# Patient Record
Sex: MALE | Race: WHITE | NOT HISPANIC OR LATINO
[De-identification: names, ages, dates, MRNs, and addresses within clinical notes are randomized per-mention and may not be internally consistent; named-entity substitution may affect disease eponyms.]

---

## 2017-04-20 ENCOUNTER — APPOINTMENT (OUTPATIENT)
Dept: CARDIOLOGY | Facility: CLINIC | Age: 82
End: 2017-04-20

## 2017-04-20 ENCOUNTER — NON-APPOINTMENT (OUTPATIENT)
Age: 82
End: 2017-04-20

## 2017-04-20 VITALS
HEART RATE: 90 BPM | WEIGHT: 215 LBS | HEIGHT: 69 IN | DIASTOLIC BLOOD PRESSURE: 78 MMHG | BODY MASS INDEX: 31.84 KG/M2 | SYSTOLIC BLOOD PRESSURE: 132 MMHG

## 2017-08-17 ENCOUNTER — APPOINTMENT (OUTPATIENT)
Dept: CARDIOLOGY | Facility: CLINIC | Age: 82
End: 2017-08-17

## 2017-09-07 ENCOUNTER — APPOINTMENT (OUTPATIENT)
Dept: CARDIOLOGY | Facility: CLINIC | Age: 82
End: 2017-09-07
Payer: MEDICARE

## 2017-09-07 ENCOUNTER — NON-APPOINTMENT (OUTPATIENT)
Age: 82
End: 2017-09-07

## 2017-09-07 VITALS
HEART RATE: 60 BPM | DIASTOLIC BLOOD PRESSURE: 84 MMHG | WEIGHT: 215 LBS | BODY MASS INDEX: 31.75 KG/M2 | OXYGEN SATURATION: 96 % | SYSTOLIC BLOOD PRESSURE: 160 MMHG

## 2017-09-07 VITALS — SYSTOLIC BLOOD PRESSURE: 124 MMHG | DIASTOLIC BLOOD PRESSURE: 84 MMHG

## 2017-09-07 PROCEDURE — 93000 ELECTROCARDIOGRAM COMPLETE: CPT

## 2017-09-07 PROCEDURE — 99214 OFFICE O/P EST MOD 30 MIN: CPT

## 2018-01-04 ENCOUNTER — APPOINTMENT (OUTPATIENT)
Dept: CARDIOLOGY | Facility: CLINIC | Age: 83
End: 2018-01-04

## 2018-01-05 ENCOUNTER — OTHER (OUTPATIENT)
Age: 83
End: 2018-01-05

## 2018-01-18 ENCOUNTER — APPOINTMENT (OUTPATIENT)
Dept: UROLOGY | Facility: CLINIC | Age: 83
End: 2018-01-18
Payer: MEDICARE

## 2018-01-18 VITALS
DIASTOLIC BLOOD PRESSURE: 80 MMHG | OXYGEN SATURATION: 97 % | TEMPERATURE: 97.7 F | SYSTOLIC BLOOD PRESSURE: 134 MMHG | HEART RATE: 62 BPM

## 2018-01-18 LAB
BILIRUB UR QL STRIP: NORMAL
CLARITY UR: CLEAR
COLLECTION METHOD: NORMAL
GLUCOSE UR-MCNC: NORMAL
HCG UR QL: 0.2 EU/DL
HGB UR QL STRIP.AUTO: NORMAL
KETONES UR-MCNC: NORMAL
LEUKOCYTE ESTERASE UR QL STRIP: NORMAL
NITRITE UR QL STRIP: NORMAL
PH UR STRIP: 6.5
PROT UR STRIP-MCNC: NORMAL
SP GR UR STRIP: 1.02

## 2018-01-18 PROCEDURE — 81003 URINALYSIS AUTO W/O SCOPE: CPT | Mod: QW

## 2018-01-18 PROCEDURE — 99214 OFFICE O/P EST MOD 30 MIN: CPT

## 2018-02-27 ENCOUNTER — NON-APPOINTMENT (OUTPATIENT)
Age: 83
End: 2018-02-27

## 2018-02-27 ENCOUNTER — APPOINTMENT (OUTPATIENT)
Dept: CARDIOLOGY | Facility: CLINIC | Age: 83
End: 2018-02-27
Payer: MEDICARE

## 2018-02-27 VITALS
SYSTOLIC BLOOD PRESSURE: 166 MMHG | BODY MASS INDEX: 32.14 KG/M2 | WEIGHT: 217 LBS | DIASTOLIC BLOOD PRESSURE: 87 MMHG | HEIGHT: 69 IN | TEMPERATURE: 79 F

## 2018-02-27 VITALS
DIASTOLIC BLOOD PRESSURE: 68 MMHG | WEIGHT: 217 LBS | BODY MASS INDEX: 32.14 KG/M2 | HEART RATE: 72 BPM | HEIGHT: 69 IN | SYSTOLIC BLOOD PRESSURE: 130 MMHG

## 2018-02-27 DIAGNOSIS — I49.9 CARDIAC ARRHYTHMIA, UNSPECIFIED: ICD-10-CM

## 2018-02-27 PROCEDURE — 93000 ELECTROCARDIOGRAM COMPLETE: CPT

## 2018-02-27 PROCEDURE — 99214 OFFICE O/P EST MOD 30 MIN: CPT

## 2018-03-06 ENCOUNTER — RX RENEWAL (OUTPATIENT)
Age: 83
End: 2018-03-06

## 2018-04-24 ENCOUNTER — EMERGENCY (EMERGENCY)
Facility: HOSPITAL | Age: 83
LOS: 1 days | Discharge: HOME | End: 2018-04-24
Attending: EMERGENCY MEDICINE | Admitting: EMERGENCY MEDICINE
Payer: MEDICARE

## 2018-04-24 VITALS
RESPIRATION RATE: 18 BRPM | TEMPERATURE: 98 F | DIASTOLIC BLOOD PRESSURE: 88 MMHG | HEART RATE: 76 BPM | OXYGEN SATURATION: 97 % | SYSTOLIC BLOOD PRESSURE: 154 MMHG

## 2018-04-24 PROCEDURE — 99284 EMERGENCY DEPT VISIT MOD MDM: CPT

## 2018-04-25 VITALS
TEMPERATURE: 98 F | SYSTOLIC BLOOD PRESSURE: 168 MMHG | DIASTOLIC BLOOD PRESSURE: 79 MMHG | RESPIRATION RATE: 20 BRPM | OXYGEN SATURATION: 94 % | HEART RATE: 64 BPM

## 2018-04-25 PROCEDURE — 99284 EMERGENCY DEPT VISIT MOD MDM: CPT

## 2018-04-25 PROCEDURE — 72170 X-RAY EXAM OF PELVIS: CPT | Mod: 26

## 2018-04-25 PROCEDURE — 73562 X-RAY EXAM OF KNEE 3: CPT | Mod: 26,50

## 2018-04-25 PROCEDURE — 71045 X-RAY EXAM CHEST 1 VIEW: CPT

## 2018-04-25 PROCEDURE — 72125 CT NECK SPINE W/O DYE: CPT | Mod: 26

## 2018-04-25 PROCEDURE — 70450 CT HEAD/BRAIN W/O DYE: CPT | Mod: 26

## 2018-04-25 PROCEDURE — 73562 X-RAY EXAM OF KNEE 3: CPT

## 2018-04-25 PROCEDURE — 70450 CT HEAD/BRAIN W/O DYE: CPT

## 2018-04-25 PROCEDURE — 72170 X-RAY EXAM OF PELVIS: CPT

## 2018-04-25 PROCEDURE — 72125 CT NECK SPINE W/O DYE: CPT

## 2018-04-25 PROCEDURE — 71045 X-RAY EXAM CHEST 1 VIEW: CPT | Mod: 26

## 2018-04-25 NOTE — ED PROVIDER NOTE - PROGRESS NOTE DETAILS
Milady GUZMAN: Patient reassessed and reports no new symptoms. No pain reported. CT results discussed with family. Patient ready for D/C Ambulette will be ordered.

## 2018-04-25 NOTE — ED PROVIDER NOTE - SHIFT CHANGE DETAILS
I have endorsed this patient to the incoming physician, including clinical history, physical exam, current results and assessment/plan. 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  Abrasions to left knee.  No visible head trauma.  Cardiac: s1s2  Lungs: CTABL  Abd: soft ntnd, no peripheral edema.  No midline spinal tenderenss, no hip deformities.  Plan: CT head/c-spine, xrays of chest, pelvis, and b/l knees.  If no acute findings/bleeding/fractures, will be stable for dc back to [facility] rehab.     Currently awaiting results of imaging.  Bev

## 2018-04-25 NOTE — ED PROVIDER NOTE - OBJECTIVE STATEMENT
89 YO male PMhx CVA 4/20/18 on eliquis, DM, HTN, Sleep apnea, GERD, presents to ED c/o fall at [facility] at 5:30 PM. Pt states he was discharged from [facility] at 4:30pm and arrived in [facility] facility for rehab. He notes he was in bed and stepping out of bed his legs felt weak, and fell forward  on his knees. Denies any precipitating event of CP, SOB, syncope before fall. Pt states he sustained no injuries, Denies CP, SOB, abdominal pain, knee pain. Denies head trauma, LOC, syncope.

## 2018-04-25 NOTE — ED PROVIDER NOTE - ATTENDING CONTRIBUTION TO CARE
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  Abrasions to left knee.  No visible head trauma.  Cardiac: s1s2  Lungs: CTABL  Abd: soft ntnd, no peripheral edema.  No midline spinal tenderenss, no hip deformities.  Plan: CT head/c-spine, xrays of chest, pelvis, and b/l knees.  If no acute findings/bleeding/fractures, will be stable for dc back to [facility] rehab.

## 2018-04-25 NOTE — ED ADULT NURSE REASSESSMENT NOTE - NS ED NURSE REASSESS COMMENT FT1
pt was VS and no IV access for pt to transfer to [facility]. Report was called to [place] Nurse supervisor.

## 2018-05-03 ENCOUNTER — OTHER (OUTPATIENT)
Age: 83
End: 2018-05-03

## 2018-06-19 ENCOUNTER — APPOINTMENT (OUTPATIENT)
Dept: CARDIOLOGY | Facility: CLINIC | Age: 83
End: 2018-06-19
Payer: MEDICARE

## 2018-06-19 ENCOUNTER — NON-APPOINTMENT (OUTPATIENT)
Age: 83
End: 2018-06-19

## 2018-06-19 VITALS — DIASTOLIC BLOOD PRESSURE: 74 MMHG | SYSTOLIC BLOOD PRESSURE: 124 MMHG

## 2018-06-19 VITALS
HEART RATE: 81 BPM | HEIGHT: 69 IN | OXYGEN SATURATION: 95 % | DIASTOLIC BLOOD PRESSURE: 82 MMHG | SYSTOLIC BLOOD PRESSURE: 153 MMHG

## 2018-06-19 PROCEDURE — 93000 ELECTROCARDIOGRAM COMPLETE: CPT

## 2018-06-19 PROCEDURE — 99215 OFFICE O/P EST HI 40 MIN: CPT

## 2018-07-26 ENCOUNTER — APPOINTMENT (OUTPATIENT)
Dept: UROLOGY | Facility: CLINIC | Age: 83
End: 2018-07-26
Payer: MEDICARE

## 2018-07-26 VITALS
BODY MASS INDEX: 32.14 KG/M2 | DIASTOLIC BLOOD PRESSURE: 74 MMHG | WEIGHT: 217 LBS | OXYGEN SATURATION: 99 % | HEART RATE: 66 BPM | SYSTOLIC BLOOD PRESSURE: 129 MMHG | HEIGHT: 69 IN

## 2018-07-26 PROCEDURE — 99213 OFFICE O/P EST LOW 20 MIN: CPT

## 2018-10-25 ENCOUNTER — APPOINTMENT (OUTPATIENT)
Dept: CARDIOLOGY | Facility: CLINIC | Age: 83
End: 2018-10-25
Payer: MEDICARE

## 2018-10-25 ENCOUNTER — NON-APPOINTMENT (OUTPATIENT)
Age: 83
End: 2018-10-25

## 2018-10-25 VITALS — OXYGEN SATURATION: 99 % | HEART RATE: 64 BPM | DIASTOLIC BLOOD PRESSURE: 79 MMHG | SYSTOLIC BLOOD PRESSURE: 149 MMHG

## 2018-10-25 PROCEDURE — 93000 ELECTROCARDIOGRAM COMPLETE: CPT

## 2018-10-25 PROCEDURE — 99214 OFFICE O/P EST MOD 30 MIN: CPT

## 2019-05-24 ENCOUNTER — NON-APPOINTMENT (OUTPATIENT)
Age: 84
End: 2019-05-24

## 2019-05-24 ENCOUNTER — APPOINTMENT (OUTPATIENT)
Dept: CARDIOLOGY | Facility: CLINIC | Age: 84
End: 2019-05-24
Payer: MEDICARE

## 2019-05-24 VITALS
WEIGHT: 196 LBS | DIASTOLIC BLOOD PRESSURE: 74 MMHG | BODY MASS INDEX: 29.03 KG/M2 | OXYGEN SATURATION: 99 % | SYSTOLIC BLOOD PRESSURE: 147 MMHG | HEART RATE: 65 BPM | HEIGHT: 69 IN

## 2019-05-24 DIAGNOSIS — G47.33 OBSTRUCTIVE SLEEP APNEA (ADULT) (PEDIATRIC): ICD-10-CM

## 2019-05-24 PROCEDURE — 93000 ELECTROCARDIOGRAM COMPLETE: CPT

## 2019-05-24 PROCEDURE — 99214 OFFICE O/P EST MOD 30 MIN: CPT

## 2019-05-24 RX ORDER — GLIPIZIDE 2.5 MG/1
2.5 TABLET, FILM COATED, EXTENDED RELEASE ORAL
Refills: 0 | Status: DISCONTINUED | COMMUNITY
Start: 2018-06-19 | End: 2019-05-24

## 2019-05-24 RX ORDER — LINAGLIPTIN 5 MG/1
5 TABLET, FILM COATED ORAL DAILY
Refills: 0 | Status: ACTIVE | COMMUNITY
Start: 2019-05-24

## 2019-05-24 NOTE — REASON FOR VISIT
[Ventricular Tachycardia] : ventricular tachycardia [Cardiomyopathy] : cardiomyopathy [FreeTextEntry1] : Stroke

## 2019-05-24 NOTE — HISTORY OF PRESENT ILLNESS
[FreeTextEntry1] : Mr. Baez presents in scheduled followup Accompanied by his aide and daughter.  He feels that he is progressing albeit slowly.  . Leg weakness limits him after approximately 500' w/nRW. Comfortable, walking indoors .. His principal/concern relates to his severe visual impairment.  \par \par Nocturia x4-5 and sleeps much of the day.\par   \par No c/o chest, throat,jaw, arm or upper back discomfort.  . No chest, throat, jaw, or arm discomfort. No dyspnea, orthopnea or PND.  No palpitations, dizziness or syncope.  No edema.\par \par A few minor falls at home. Fell approximately 10 days ago on a slippery floor in a restaurant landing on his back. No head trauma.\par \par 
DISPLAY PLAN FREE TEXT

## 2019-09-04 ENCOUNTER — APPOINTMENT (OUTPATIENT)
Dept: CARDIOLOGY | Facility: CLINIC | Age: 84
End: 2019-09-04
Payer: MEDICARE

## 2019-09-04 ENCOUNTER — NON-APPOINTMENT (OUTPATIENT)
Age: 84
End: 2019-09-04

## 2019-09-04 VITALS
SYSTOLIC BLOOD PRESSURE: 132 MMHG | HEIGHT: 69 IN | BODY MASS INDEX: 28.73 KG/M2 | WEIGHT: 194 LBS | OXYGEN SATURATION: 98 % | DIASTOLIC BLOOD PRESSURE: 76 MMHG | HEART RATE: 79 BPM

## 2019-09-04 PROCEDURE — 99214 OFFICE O/P EST MOD 30 MIN: CPT

## 2019-09-04 PROCEDURE — 93000 ELECTROCARDIOGRAM COMPLETE: CPT

## 2019-09-04 NOTE — HISTORY OF PRESENT ILLNESS
[FreeTextEntry1] : Mr. Baez presents in scheduled followup accompanied by his daughter.  He walks laps in his kitchen and is beginning to walk outdoors w/ his RW.  . Leg weakness limits him after approximately 500' w/nRW. Comfortable, walking indoors .. His principal/concern relates to his severe visual impairment.  \par \par No c/o chest, throat,jaw, arm or upper back discomfort.  . No chest, throat, jaw, or arm discomfort. No dyspnea, orthopnea or PND.  No palpitations, dizziness or syncope.  No edema.\par \par Very concerned about his wife's disability and depression.\par \par

## 2019-09-04 NOTE — REASON FOR VISIT
[Cardiomyopathy] : cardiomyopathy [Ventricular Tachycardia] : ventricular tachycardia [FreeTextEntry1] : Stroke

## 2019-09-08 ENCOUNTER — TRANSCRIPTION ENCOUNTER (OUTPATIENT)
Age: 84
End: 2019-09-08

## 2019-11-25 ENCOUNTER — MEDICATION RENEWAL (OUTPATIENT)
Age: 84
End: 2019-11-25

## 2019-11-26 ENCOUNTER — MEDICATION RENEWAL (OUTPATIENT)
Age: 84
End: 2019-11-26

## 2019-11-27 ENCOUNTER — APPOINTMENT (OUTPATIENT)
Dept: UROLOGY | Facility: CLINIC | Age: 84
End: 2019-11-27
Payer: MEDICARE

## 2019-11-27 VITALS
HEART RATE: 60 BPM | RESPIRATION RATE: 14 BRPM | DIASTOLIC BLOOD PRESSURE: 72 MMHG | OXYGEN SATURATION: 94 % | SYSTOLIC BLOOD PRESSURE: 104 MMHG

## 2019-11-27 DIAGNOSIS — N32.81 OVERACTIVE BLADDER: ICD-10-CM

## 2019-11-27 PROCEDURE — 99214 OFFICE O/P EST MOD 30 MIN: CPT

## 2019-11-27 NOTE — ASSESSMENT
[FreeTextEntry1] : #1) Symptomatic obstructive BPH:Restart tamsulosin and finasteride \par \par #2) overactive bladder: Inactive at this time.\par \par RTO 3 months for reevaluation.\par

## 2019-11-27 NOTE — PHYSICAL EXAM
[General Appearance - Well Developed] : well developed [General Appearance - Well Nourished] : well nourished [Normal Appearance] : normal appearance [Well Groomed] : well groomed [General Appearance - In No Acute Distress] : no acute distress [Bowel Sounds] : normal bowel sounds [Abdomen Soft] : soft [Abdomen Tenderness] : non-tender [Abdomen Mass (___ Cm)] : no abdominal mass palpated [Costovertebral Angle Tenderness] : no ~M costovertebral angle tenderness [] : no rash [Edema] : no peripheral edema [Oriented To Time, Place, And Person] : oriented to person, place, and time [Affect] : the affect was normal [Mood] : the mood was normal [Not Anxious] : not anxious [No Focal Deficits] : no focal deficits [FreeTextEntry1] : The patient is now in a wheelchair.

## 2019-11-27 NOTE — HISTORY OF PRESENT ILLNESS
[FreeTextEntry1] : The patient is a 89-year-old gentleman who was seen in the office today with his daughter, Odette, for the above. He had been on tamsulosin and finasteride but at the last visit on 7/26/18, he stated that he forgot to take his medication for a time and is urinary symptoms did not get worse. He decided to stay off these medications. His daughter stated that over the last couple of my him and his symptoms have become much worse and brought him in today to restart medical therapy. He denies other urological and constitutional symptomatology.\par \par His past medical history, is updated for a right CVA with left weakness in 4/18.\par His surgical history, medication history, and allergy history are unchanged.

## 2019-12-02 LAB
ALBUMIN SERPL ELPH-MCNC: 4.2 G/DL
ALP BLD-CCNC: 90 U/L
ALT SERPL-CCNC: 13 U/L
ANION GAP SERPL CALC-SCNC: 14 MMOL/L
AST SERPL-CCNC: 13 U/L
BASOPHILS # BLD AUTO: 0.05 K/UL
BASOPHILS NFR BLD AUTO: 0.7 %
BILIRUB SERPL-MCNC: 0.4 MG/DL
BUN SERPL-MCNC: 32 MG/DL
CALCIUM SERPL-MCNC: 9.6 MG/DL
CHLORIDE SERPL-SCNC: 99 MMOL/L
CO2 SERPL-SCNC: 23 MMOL/L
CREAT SERPL-MCNC: 1.94 MG/DL
EOSINOPHIL # BLD AUTO: 0.43 K/UL
EOSINOPHIL NFR BLD AUTO: 5.7 %
GLUCOSE SERPL-MCNC: 161 MG/DL
HCT VFR BLD CALC: 40.7 %
HGB BLD-MCNC: 13.6 G/DL
IMM GRANULOCYTES NFR BLD AUTO: 0.5 %
LYMPHOCYTES # BLD AUTO: 1.6 K/UL
LYMPHOCYTES NFR BLD AUTO: 21.1 %
MAN DIFF?: NORMAL
MCHC RBC-ENTMCNC: 31.4 PG
MCHC RBC-ENTMCNC: 33.4 GM/DL
MCV RBC AUTO: 94 FL
MONOCYTES # BLD AUTO: 0.66 K/UL
MONOCYTES NFR BLD AUTO: 8.7 %
NEUTROPHILS # BLD AUTO: 4.81 K/UL
NEUTROPHILS NFR BLD AUTO: 63.3 %
PLATELET # BLD AUTO: 248 K/UL
POTASSIUM SERPL-SCNC: 4.9 MMOL/L
PROT SERPL-MCNC: 7 G/DL
RBC # BLD: 4.33 M/UL
RBC # FLD: 12.4 %
SODIUM SERPL-SCNC: 136 MMOL/L
WBC # FLD AUTO: 7.59 K/UL

## 2019-12-26 ENCOUNTER — LABORATORY RESULT (OUTPATIENT)
Age: 84
End: 2019-12-26

## 2019-12-26 ENCOUNTER — OTHER (OUTPATIENT)
Age: 84
End: 2019-12-26

## 2020-01-27 ENCOUNTER — APPOINTMENT (OUTPATIENT)
Dept: CARDIOLOGY | Facility: CLINIC | Age: 85
End: 2020-01-27
Payer: MEDICARE

## 2020-01-27 ENCOUNTER — NON-APPOINTMENT (OUTPATIENT)
Age: 85
End: 2020-01-27

## 2020-01-27 VITALS
HEART RATE: 51 BPM | DIASTOLIC BLOOD PRESSURE: 69 MMHG | WEIGHT: 200 LBS | SYSTOLIC BLOOD PRESSURE: 171 MMHG | TEMPERATURE: 98.1 F | OXYGEN SATURATION: 100 % | BODY MASS INDEX: 29.62 KG/M2 | RESPIRATION RATE: 16 BRPM | HEIGHT: 69 IN

## 2020-01-27 VITALS — SYSTOLIC BLOOD PRESSURE: 142 MMHG | DIASTOLIC BLOOD PRESSURE: 78 MMHG

## 2020-01-27 PROCEDURE — 93000 ELECTROCARDIOGRAM COMPLETE: CPT

## 2020-01-27 PROCEDURE — 99214 OFFICE O/P EST MOD 30 MIN: CPT

## 2020-01-27 NOTE — HISTORY OF PRESENT ILLNESS
[FreeTextEntry1] : Mr. Baez presents in scheduled followup accompanied by his son-in-law.he offers no specific complaints.  Now has full-time aide.  Nocturia improved somewhat after the reinstitution of x2 low sent and finasteride.  Activities are limited but no specific effort provoked symptoms.   \par \par No c/o chest, throat,jaw, arm or upper back discomfort.  . No chest, throat, jaw, or arm discomfort. No dyspnea, orthopnea or PND.  No palpitations, dizziness or syncope.  No edema.\par \par Not using CPAP.

## 2020-04-01 ENCOUNTER — APPOINTMENT (OUTPATIENT)
Dept: UROLOGY | Facility: CLINIC | Age: 85
End: 2020-04-01

## 2020-05-21 ENCOUNTER — APPOINTMENT (OUTPATIENT)
Dept: CARDIOLOGY | Facility: CLINIC | Age: 85
End: 2020-05-21

## 2020-06-15 ENCOUNTER — RX RENEWAL (OUTPATIENT)
Age: 85
End: 2020-06-15

## 2020-07-01 ENCOUNTER — RX RENEWAL (OUTPATIENT)
Age: 85
End: 2020-07-01

## 2020-07-14 ENCOUNTER — APPOINTMENT (OUTPATIENT)
Dept: CARDIOLOGY | Facility: CLINIC | Age: 85
End: 2020-07-14
Payer: MEDICARE

## 2020-07-14 ENCOUNTER — NON-APPOINTMENT (OUTPATIENT)
Age: 85
End: 2020-07-14

## 2020-07-14 VITALS
DIASTOLIC BLOOD PRESSURE: 78 MMHG | OXYGEN SATURATION: 97 % | SYSTOLIC BLOOD PRESSURE: 116 MMHG | TEMPERATURE: 98.2 F | HEART RATE: 66 BPM

## 2020-07-14 PROCEDURE — 99214 OFFICE O/P EST MOD 30 MIN: CPT

## 2020-07-14 PROCEDURE — 93000 ELECTROCARDIOGRAM COMPLETE: CPT

## 2020-07-14 PROCEDURE — 36415 COLL VENOUS BLD VENIPUNCTURE: CPT

## 2020-07-14 NOTE — HISTORY OF PRESENT ILLNESS
[FreeTextEntry1] : Mr. Baez presents in scheduled followup accompanied by his wife and daughter.  He offers no specific complaints.  Reports that he was hospitalized in March with a UTI associated with fusion.  Responded to Cipro.  Treated for recurrent UTI once thereafter and again responded to Cipro.  Discharge amiodarone was increased to 200 mg once daily.  Daughter does not know what drove this change\par \par Since that time he has been inactive.  Refuses the goading of his aids to walk and has developed bedsores.  He is admittedly depressed but feels that he is improved somewhat late.  Still has periods of confusion and occasional periods of agitation.\par \par No c/o chest, throat,jaw, arm or upper back discomfort.  . No chest, throat, jaw, or arm discomfort. No dyspnea, orthopnea or PND.  No palpitations, dizziness or syncope.  No edema.\par

## 2020-07-15 LAB
ALBUMIN SERPL ELPH-MCNC: 4.3 G/DL
ALP BLD-CCNC: 75 U/L
ALT SERPL-CCNC: 58 U/L
ANION GAP SERPL CALC-SCNC: 16 MMOL/L
AST SERPL-CCNC: 39 U/L
BASOPHILS # BLD AUTO: 0.05 K/UL
BASOPHILS NFR BLD AUTO: 0.5 %
BILIRUB SERPL-MCNC: 0.4 MG/DL
BUN SERPL-MCNC: 28 MG/DL
CALCIUM SERPL-MCNC: 9.3 MG/DL
CHLORIDE SERPL-SCNC: 99 MMOL/L
CHOLEST SERPL-MCNC: 120 MG/DL
CHOLEST/HDLC SERPL: 3 RATIO
CO2 SERPL-SCNC: 21 MMOL/L
CREAT SERPL-MCNC: 1.89 MG/DL
EOSINOPHIL # BLD AUTO: 0.3 K/UL
EOSINOPHIL NFR BLD AUTO: 3.3 %
ESTIMATED AVERAGE GLUCOSE: 140 MG/DL
GLUCOSE SERPL-MCNC: 110 MG/DL
HBA1C MFR BLD HPLC: 6.5 %
HCT VFR BLD CALC: 42.3 %
HDLC SERPL-MCNC: 40 MG/DL
HGB BLD-MCNC: 13.2 G/DL
IMM GRANULOCYTES NFR BLD AUTO: 0.3 %
LDLC SERPL CALC-MCNC: 48 MG/DL
LDLC SERPL DIRECT ASSAY-MCNC: 57 MG/DL
LYMPHOCYTES # BLD AUTO: 2.01 K/UL
LYMPHOCYTES NFR BLD AUTO: 22.1 %
MAN DIFF?: NORMAL
MCHC RBC-ENTMCNC: 31.1 PG
MCHC RBC-ENTMCNC: 31.2 GM/DL
MCV RBC AUTO: 99.5 FL
MONOCYTES # BLD AUTO: 0.92 K/UL
MONOCYTES NFR BLD AUTO: 10.1 %
NEUTROPHILS # BLD AUTO: 5.79 K/UL
NEUTROPHILS NFR BLD AUTO: 63.7 %
PLATELET # BLD AUTO: 244 K/UL
POTASSIUM SERPL-SCNC: 4.7 MMOL/L
PROT SERPL-MCNC: 7.3 G/DL
RBC # BLD: 4.25 M/UL
RBC # FLD: 12.9 %
SODIUM SERPL-SCNC: 136 MMOL/L
T4 FREE SERPL-MCNC: 1.5 NG/DL
TRIGL SERPL-MCNC: 161 MG/DL
TSH SERPL-ACNC: 2.66 UIU/ML
WBC # FLD AUTO: 9.1 K/UL

## 2020-07-16 LAB
SARS-COV-2 IGG SERPL IA-ACNC: <0.1 INDEX
SARS-COV-2 IGG SERPL QL IA: NEGATIVE

## 2020-09-04 ENCOUNTER — RX RENEWAL (OUTPATIENT)
Age: 85
End: 2020-09-04

## 2020-11-02 ENCOUNTER — APPOINTMENT (OUTPATIENT)
Dept: CARDIOLOGY | Facility: CLINIC | Age: 85
End: 2020-11-02
Payer: MEDICARE

## 2020-11-02 ENCOUNTER — NON-APPOINTMENT (OUTPATIENT)
Age: 85
End: 2020-11-02

## 2020-11-02 VITALS
HEIGHT: 69 IN | SYSTOLIC BLOOD PRESSURE: 128 MMHG | HEART RATE: 67 BPM | DIASTOLIC BLOOD PRESSURE: 70 MMHG | TEMPERATURE: 98.2 F

## 2020-11-02 DIAGNOSIS — I49.3 VENTRICULAR PREMATURE DEPOLARIZATION: ICD-10-CM

## 2020-11-02 DIAGNOSIS — Z79.899 OTHER LONG TERM (CURRENT) DRUG THERAPY: ICD-10-CM

## 2020-11-02 DIAGNOSIS — R94.31 ABNORMAL ELECTROCARDIOGRAM [ECG] [EKG]: ICD-10-CM

## 2020-11-02 DIAGNOSIS — Z23 ENCOUNTER FOR IMMUNIZATION: ICD-10-CM

## 2020-11-02 PROCEDURE — 99214 OFFICE O/P EST MOD 30 MIN: CPT

## 2020-11-02 PROCEDURE — 90732 PPSV23 VACC 2 YRS+ SUBQ/IM: CPT

## 2020-11-02 PROCEDURE — 36415 COLL VENOUS BLD VENIPUNCTURE: CPT

## 2020-11-02 PROCEDURE — 90662 IIV NO PRSV INCREASED AG IM: CPT

## 2020-11-02 PROCEDURE — G0008: CPT

## 2020-11-02 PROCEDURE — 93000 ELECTROCARDIOGRAM COMPLETE: CPT

## 2020-11-02 PROCEDURE — G0009: CPT

## 2020-11-02 RX ORDER — CIPROFLOXACIN HYDROCHLORIDE 500 MG/1
500 TABLET, FILM COATED ORAL TWICE DAILY
Qty: 14 | Refills: 0 | Status: DISCONTINUED | COMMUNITY
Start: 2020-05-23 | End: 2020-11-02

## 2020-11-02 RX ORDER — ESCITALOPRAM OXALATE 10 MG/1
10 TABLET ORAL
Qty: 90 | Refills: 0 | Status: ACTIVE | COMMUNITY
Start: 2020-07-23

## 2020-11-02 NOTE — HISTORY OF PRESENT ILLNESS
[FreeTextEntry1] : Mr. Baez presents in scheduled followup accompanied by his daughter.  He offers no specific complaints.  His daughter reports that he has improved with excellent appetite and some subtle increase in activity although he still enjoys spending much of his time in bed and has to be coaxed into a chair.  His only significant interval problem was 2 additional URIs.  Although the daughter does not recall any fever during these episodes, the family finds that his stroke symptoms are more pronounced at these times with confusion and left-sided weakness.  Returns to baseline after resolution of the acute infection.  Decubiti have healed.\par \par No c/o chest, throat,jaw, arm or upper back discomfort.  . No chest, throat, jaw, or arm discomfort. No dyspnea, orthopnea or PND.  No palpitations, dizziness or syncope.  No edema.\par

## 2020-11-03 LAB
25(OH)D3 SERPL-MCNC: 50.1 NG/ML
ALBUMIN SERPL ELPH-MCNC: 4 G/DL
ALP BLD-CCNC: 83 U/L
ALT SERPL-CCNC: 79 U/L
ANION GAP SERPL CALC-SCNC: 12 MMOL/L
APPEARANCE: ABNORMAL
AST SERPL-CCNC: 50 U/L
BACTERIA: ABNORMAL
BASOPHILS # BLD AUTO: 0.06 K/UL
BASOPHILS NFR BLD AUTO: 0.6 %
BILIRUB SERPL-MCNC: 0.5 MG/DL
BILIRUBIN URINE: NEGATIVE
BLOOD URINE: NEGATIVE
BUN SERPL-MCNC: 28 MG/DL
CALCIUM SERPL-MCNC: 8.8 MG/DL
CHLORIDE SERPL-SCNC: 99 MMOL/L
CHOLEST SERPL-MCNC: 110 MG/DL
CO2 SERPL-SCNC: 24 MMOL/L
COLOR: YELLOW
CREAT SERPL-MCNC: 1.73 MG/DL
EOSINOPHIL # BLD AUTO: 0.24 K/UL
EOSINOPHIL NFR BLD AUTO: 2.4 %
ESTIMATED AVERAGE GLUCOSE: 146 MG/DL
GLUCOSE QUALITATIVE U: NEGATIVE
GLUCOSE SERPL-MCNC: 111 MG/DL
HBA1C MFR BLD HPLC: 6.7 %
HCT VFR BLD CALC: 40.8 %
HDLC SERPL-MCNC: 40 MG/DL
HGB BLD-MCNC: 13.4 G/DL
HYALINE CASTS: 0 /LPF
IMM GRANULOCYTES NFR BLD AUTO: 0.5 %
KETONES URINE: NEGATIVE
LDLC SERPL CALC-MCNC: 37 MG/DL
LDLC SERPL DIRECT ASSAY-MCNC: 49 MG/DL
LEUKOCYTE ESTERASE URINE: ABNORMAL
LYMPHOCYTES # BLD AUTO: 2.19 K/UL
LYMPHOCYTES NFR BLD AUTO: 22.3 %
MAN DIFF?: NORMAL
MCHC RBC-ENTMCNC: 32.5 PG
MCHC RBC-ENTMCNC: 32.8 GM/DL
MCV RBC AUTO: 99 FL
MICROSCOPIC-UA: NORMAL
MONOCYTES # BLD AUTO: 0.95 K/UL
MONOCYTES NFR BLD AUTO: 9.7 %
NEUTROPHILS # BLD AUTO: 6.35 K/UL
NEUTROPHILS NFR BLD AUTO: 64.5 %
NITRITE URINE: POSITIVE
NONHDLC SERPL-MCNC: 70 MG/DL
PH URINE: 8.5
PLATELET # BLD AUTO: 231 K/UL
POTASSIUM SERPL-SCNC: 4.6 MMOL/L
PROT SERPL-MCNC: 6.5 G/DL
PROTEIN URINE: ABNORMAL
RBC # BLD: 4.12 M/UL
RBC # FLD: 13.2 %
RED BLOOD CELLS URINE: 4 /HPF
SODIUM SERPL-SCNC: 135 MMOL/L
SPECIFIC GRAVITY URINE: 1.02
SQUAMOUS EPITHELIAL CELLS: 1 /HPF
T4 FREE SERPL-MCNC: 1.5 NG/DL
TRIGL SERPL-MCNC: 167 MG/DL
TSH SERPL-ACNC: 2.95 UIU/ML
UROBILINOGEN URINE: NORMAL
WBC # FLD AUTO: 9.84 K/UL
WHITE BLOOD CELLS URINE: 38 /HPF

## 2020-11-05 LAB — BACTERIA UR CULT: ABNORMAL

## 2020-11-06 DIAGNOSIS — Z87.440 PERSONAL HISTORY OF URINARY (TRACT) INFECTIONS: ICD-10-CM

## 2020-12-15 ENCOUNTER — RX RENEWAL (OUTPATIENT)
Age: 85
End: 2020-12-15

## 2020-12-17 RX ORDER — POTASSIUM CHLORIDE 750 MG/1
10 TABLET, FILM COATED, EXTENDED RELEASE ORAL DAILY
Qty: 90 | Refills: 3 | Status: ACTIVE | COMMUNITY
Start: 2018-06-19 | End: 1900-01-01

## 2020-12-23 ENCOUNTER — APPOINTMENT (OUTPATIENT)
Dept: UROLOGY | Facility: CLINIC | Age: 85
End: 2020-12-23
Payer: MEDICARE

## 2020-12-23 DIAGNOSIS — R39.9 UNSPECIFIED SYMPTOMS AND SIGNS INVOLVING THE GENITOURINARY SYSTEM: ICD-10-CM

## 2020-12-23 PROBLEM — Z87.440 HISTORY OF URINARY TRACT INFECTION: Status: RESOLVED | Noted: 2020-11-06 | Resolved: 2020-12-23

## 2020-12-23 PROCEDURE — 51702 INSERT TEMP BLADDER CATH: CPT

## 2020-12-23 PROCEDURE — 99213 OFFICE O/P EST LOW 20 MIN: CPT | Mod: 25

## 2020-12-23 NOTE — PHYSICAL EXAM
[General Appearance - Well Developed] : well developed [General Appearance - Well Nourished] : well nourished [Normal Appearance] : normal appearance [Well Groomed] : well groomed [General Appearance - In No Acute Distress] : no acute distress [Bowel Sounds] : normal bowel sounds [Abdomen Soft] : soft [Abdomen Tenderness] : non-tender [Abdomen Mass (___ Cm)] : no abdominal mass palpated [Costovertebral Angle Tenderness] : no ~M costovertebral angle tenderness [] : no rash [Affect] : the affect was normal [Mood] : the mood was normal [Not Anxious] : not anxious [No Focal Deficits] : no focal deficits [FreeTextEntry1] : The patient is in a wheelchair.

## 2020-12-23 NOTE — HISTORY OF PRESENT ILLNESS
[FreeTextEntry1] : The patient is a 89-year-old gentleman who has been on tamsulosin and finasteride for BPH.  His daughter stated on the phone urine was dark and foul smelling and that he developed an imbalance problem which she gets with a UTI.  He has had no flank or abdominal pain and no fever or chills.  She also reported that he has had 5 UTIs over the last 5 months.  He had to be hospitalized at [facility] in August for 1 of these.  His last UTI was on 11/2/2020 and the urine culture grew greater than 100,000 colonies of Proteus mirabilis sensitive to cephalosporins.  Dr. Pepe treated him with cefuroxime.\par When his daughter called recently stating that his symptoms have recurred she was requested to bring him to the office for straight cath for a sterile urine specimen.\par He has no new urological or constitutional symptomatology.\par \par His past medical history, surgical history, medication history and allergy history are unchanged.\par \par \par

## 2020-12-23 NOTE — ASSESSMENT
[FreeTextEntry1] : 1.)  Recurrent UTIs\par 5 UTIs over the last 5 months according to the daughter.\par Suspect incomplete bladder emptying due to BPH and detrusor hypofunction.\par \par Patient voided just before he came to the office.\par A PVR bladder scan was done and 140 mL were present.\par He was incontinent before the 16 Indonesian Cortes could be inserted so only 20 cc were obtained after the catheter had been placed.  This will be left indwelling and the reason was explained to the patient and his daughter.\par Keflex 250 mg p.o. 4 times daily x1 week will be started empirically until the urine culture is available.\par \par Inserted 16Fr Coude catheter without difficulty as per Dr. Yo.\par Urine dark, danielle.\par Specimen obtained for Culture and Sensitivity.\par Pt.'s daughter instructed on emptying drainage bag, changing bag.\par She verbalizes understanding.\par Has Home Health Aides.\par To arrange Home Care for routine catheter changes monthly.\par Supplies for first catheter change given to daughter.

## 2020-12-27 LAB — BACTERIA UR CULT: ABNORMAL

## 2021-01-01 ENCOUNTER — APPOINTMENT (OUTPATIENT)
Dept: CARDIOLOGY | Facility: CLINIC | Age: 86
End: 2021-01-01
Payer: MEDICARE

## 2021-01-01 ENCOUNTER — LABORATORY RESULT (OUTPATIENT)
Age: 86
End: 2021-01-01

## 2021-01-01 ENCOUNTER — NON-APPOINTMENT (OUTPATIENT)
Age: 86
End: 2021-01-01

## 2021-01-01 ENCOUNTER — TRANSCRIPTION ENCOUNTER (OUTPATIENT)
Age: 86
End: 2021-01-01

## 2021-01-01 ENCOUNTER — RX RENEWAL (OUTPATIENT)
Age: 86
End: 2021-01-01

## 2021-01-01 ENCOUNTER — INPATIENT (INPATIENT)
Facility: HOSPITAL | Age: 86
LOS: 8 days | DRG: 177 | End: 2022-01-01
Attending: STUDENT IN AN ORGANIZED HEALTH CARE EDUCATION/TRAINING PROGRAM | Admitting: STUDENT IN AN ORGANIZED HEALTH CARE EDUCATION/TRAINING PROGRAM
Payer: MEDICARE

## 2021-01-01 ENCOUNTER — INPATIENT (INPATIENT)
Facility: HOSPITAL | Age: 86
LOS: 5 days | Discharge: HOME | DRG: 698 | End: 2021-01-26
Attending: STUDENT IN AN ORGANIZED HEALTH CARE EDUCATION/TRAINING PROGRAM | Admitting: STUDENT IN AN ORGANIZED HEALTH CARE EDUCATION/TRAINING PROGRAM
Payer: MEDICARE

## 2021-01-01 ENCOUNTER — APPOINTMENT (OUTPATIENT)
Dept: CARDIOLOGY | Facility: CLINIC | Age: 86
End: 2021-01-01

## 2021-01-01 ENCOUNTER — APPOINTMENT (OUTPATIENT)
Dept: UROLOGY | Facility: CLINIC | Age: 86
End: 2021-01-01
Payer: MEDICARE

## 2021-01-01 ENCOUNTER — APPOINTMENT (OUTPATIENT)
Dept: UROLOGY | Facility: CLINIC | Age: 86
End: 2021-01-01

## 2021-01-01 ENCOUNTER — EMERGENCY (EMERGENCY)
Facility: HOSPITAL | Age: 86
LOS: 1 days | Discharge: HOME | End: 2021-01-01
Attending: EMERGENCY MEDICINE
Payer: MEDICARE

## 2021-01-01 VITALS
SYSTOLIC BLOOD PRESSURE: 113 MMHG | BODY MASS INDEX: 29.62 KG/M2 | DIASTOLIC BLOOD PRESSURE: 71 MMHG | TEMPERATURE: 98 F | HEIGHT: 69 IN | RESPIRATION RATE: 17 BRPM | WEIGHT: 200 LBS | HEART RATE: 75 BPM

## 2021-01-01 VITALS
HEART RATE: 66 BPM | SYSTOLIC BLOOD PRESSURE: 138 MMHG | WEIGHT: 179.9 LBS | TEMPERATURE: 98 F | DIASTOLIC BLOOD PRESSURE: 80 MMHG | RESPIRATION RATE: 16 BRPM

## 2021-01-01 VITALS
DIASTOLIC BLOOD PRESSURE: 80 MMHG | RESPIRATION RATE: 20 BRPM | SYSTOLIC BLOOD PRESSURE: 175 MMHG | TEMPERATURE: 97 F | HEART RATE: 55 BPM | OXYGEN SATURATION: 98 %

## 2021-01-01 VITALS
WEIGHT: 160.06 LBS | RESPIRATION RATE: 18 BRPM | DIASTOLIC BLOOD PRESSURE: 68 MMHG | TEMPERATURE: 98 F | HEART RATE: 64 BPM | OXYGEN SATURATION: 96 % | SYSTOLIC BLOOD PRESSURE: 122 MMHG

## 2021-01-01 VITALS
HEART RATE: 72 BPM | SYSTOLIC BLOOD PRESSURE: 125 MMHG | RESPIRATION RATE: 22 BRPM | WEIGHT: 220.02 LBS | OXYGEN SATURATION: 94 % | DIASTOLIC BLOOD PRESSURE: 69 MMHG | TEMPERATURE: 98 F

## 2021-01-01 VITALS
OXYGEN SATURATION: 95 % | TEMPERATURE: 98 F | DIASTOLIC BLOOD PRESSURE: 79 MMHG | HEART RATE: 56 BPM | SYSTOLIC BLOOD PRESSURE: 157 MMHG | RESPIRATION RATE: 18 BRPM

## 2021-01-01 VITALS — TEMPERATURE: 94.2 F

## 2021-01-01 DIAGNOSIS — U07.1 COVID-19: ICD-10-CM

## 2021-01-01 DIAGNOSIS — E11.9 TYPE 2 DIABETES MELLITUS WITHOUT COMPLICATIONS: ICD-10-CM

## 2021-01-01 DIAGNOSIS — Z86.73 PERSONAL HISTORY OF TRANSIENT ISCHEMIC ATTACK (TIA), AND CEREBRAL INFARCTION WITHOUT RESIDUAL DEFICITS: ICD-10-CM

## 2021-01-01 DIAGNOSIS — Z71.89 OTHER SPECIFIED COUNSELING: ICD-10-CM

## 2021-01-01 DIAGNOSIS — N39.0 URINARY TRACT INFECTION, SITE NOT SPECIFIED: ICD-10-CM

## 2021-01-01 DIAGNOSIS — J96.01 ACUTE RESPIRATORY FAILURE WITH HYPOXIA: ICD-10-CM

## 2021-01-01 DIAGNOSIS — K63.1 PERFORATION OF INTESTINE (NONTRAUMATIC): Chronic | ICD-10-CM

## 2021-01-01 DIAGNOSIS — M48.00 SPINAL STENOSIS, SITE UNSPECIFIED: ICD-10-CM

## 2021-01-01 DIAGNOSIS — R05.9 COUGH, UNSPECIFIED: ICD-10-CM

## 2021-01-01 DIAGNOSIS — M67.432 GANGLION, LEFT WRIST: Chronic | ICD-10-CM

## 2021-01-01 DIAGNOSIS — Z86.79 PERSONAL HISTORY OF OTHER DISEASES OF THE CIRCULATORY SYSTEM: ICD-10-CM

## 2021-01-01 DIAGNOSIS — R77.8 OTHER SPECIFIED ABNORMALITIES OF PLASMA PROTEINS: ICD-10-CM

## 2021-01-01 DIAGNOSIS — N40.1 BENIGN PROSTATIC HYPERPLASIA WITH LOWER URINARY TRACT SYMPMS: ICD-10-CM

## 2021-01-01 DIAGNOSIS — N39.41 URGE INCONTINENCE: ICD-10-CM

## 2021-01-01 DIAGNOSIS — Z98.49 CATARACT EXTRACTION STATUS, UNSPECIFIED EYE: Chronic | ICD-10-CM

## 2021-01-01 DIAGNOSIS — N13.8 BENIGN PROSTATIC HYPERPLASIA WITH LOWER URINARY TRACT SYMPMS: ICD-10-CM

## 2021-01-01 DIAGNOSIS — R53.81 OTHER MALAISE: ICD-10-CM

## 2021-01-01 DIAGNOSIS — R74.8 ABNORMAL LEVELS OF OTHER SERUM ENZYMES: ICD-10-CM

## 2021-01-01 DIAGNOSIS — R33.9 RETENTION OF URINE, UNSPECIFIED: ICD-10-CM

## 2021-01-01 DIAGNOSIS — I25.10 ATHEROSCLEROTIC HEART DISEASE OF NATIVE CORONARY ARTERY W/OUT ANGINA PECTORIS: ICD-10-CM

## 2021-01-01 DIAGNOSIS — Z90.49 ACQUIRED ABSENCE OF OTHER SPECIFIED PARTS OF DIGESTIVE TRACT: Chronic | ICD-10-CM

## 2021-01-01 DIAGNOSIS — I49.9 CARDIAC ARRHYTHMIA, UNSPECIFIED: ICD-10-CM

## 2021-01-01 DIAGNOSIS — Z29.9 ENCOUNTER FOR PROPHYLACTIC MEASURES, UNSPECIFIED: ICD-10-CM

## 2021-01-01 DIAGNOSIS — N17.9 ACUTE KIDNEY FAILURE, UNSPECIFIED: ICD-10-CM

## 2021-01-01 DIAGNOSIS — Z91.89 OTHER SPECIFIED PERSONAL RISK FACTORS, NOT ELSEWHERE CLASSIFIED: ICD-10-CM

## 2021-01-01 DIAGNOSIS — E11.9 TYPE 2 DIABETES MELLITUS W/OUT COMPLICATIONS: ICD-10-CM

## 2021-01-01 DIAGNOSIS — I63.9 CEREBRAL INFARCTION, UNSPECIFIED: ICD-10-CM

## 2021-01-01 DIAGNOSIS — Z97.8 PRESENCE OF OTHER SPECIFIED DEVICES: ICD-10-CM

## 2021-01-01 DIAGNOSIS — N18.32 CHRONIC KIDNEY DISEASE, STAGE 3B: ICD-10-CM

## 2021-01-01 DIAGNOSIS — N47.2 PARAPHIMOSIS: ICD-10-CM

## 2021-01-01 DIAGNOSIS — Z79.899 OTHER SPECIFIED PERSONAL RISK FACTORS, NOT ELSEWHERE CLASSIFIED: ICD-10-CM

## 2021-01-01 DIAGNOSIS — I42.9 CARDIOMYOPATHY, UNSPECIFIED: ICD-10-CM

## 2021-01-01 DIAGNOSIS — N40.0 BENIGN PROSTATIC HYPERPLASIA WITHOUT LOWER URINARY TRACT SYMPTOMS: ICD-10-CM

## 2021-01-01 LAB
-  AMIKACIN: SIGNIFICANT CHANGE UP
-  AMIKACIN: SIGNIFICANT CHANGE UP
-  AMPICILLIN/SULBACTAM: SIGNIFICANT CHANGE UP
-  AZTREONAM: SIGNIFICANT CHANGE UP
-  AZTREONAM: SIGNIFICANT CHANGE UP
-  CEFAZOLIN: SIGNIFICANT CHANGE UP
-  CEFEPIME: SIGNIFICANT CHANGE UP
-  CEFEPIME: SIGNIFICANT CHANGE UP
-  CEFTAZIDIME: SIGNIFICANT CHANGE UP
-  CEFTAZIDIME: SIGNIFICANT CHANGE UP
-  CIPROFLOXACIN: SIGNIFICANT CHANGE UP
-  CIPROFLOXACIN: SIGNIFICANT CHANGE UP
-  CLINDAMYCIN: SIGNIFICANT CHANGE UP
-  DAPTOMYCIN: SIGNIFICANT CHANGE UP
-  ERYTHROMYCIN: SIGNIFICANT CHANGE UP
-  GENTAMICIN: SIGNIFICANT CHANGE UP
-  IMIPENEM: SIGNIFICANT CHANGE UP
-  IMIPENEM: SIGNIFICANT CHANGE UP
-  LEVOFLOXACIN: SIGNIFICANT CHANGE UP
-  LEVOFLOXACIN: SIGNIFICANT CHANGE UP
-  LINEZOLID: SIGNIFICANT CHANGE UP
-  MEROPENEM: SIGNIFICANT CHANGE UP
-  MEROPENEM: SIGNIFICANT CHANGE UP
-  OXACILLIN: SIGNIFICANT CHANGE UP
-  PENICILLIN: SIGNIFICANT CHANGE UP
-  PIPERACILLIN/TAZOBACTAM: SIGNIFICANT CHANGE UP
-  PIPERACILLIN/TAZOBACTAM: SIGNIFICANT CHANGE UP
-  RIFAMPIN: SIGNIFICANT CHANGE UP
-  TETRACYCLINE: SIGNIFICANT CHANGE UP
-  TOBRAMYCIN: SIGNIFICANT CHANGE UP
-  TOBRAMYCIN: SIGNIFICANT CHANGE UP
-  TRIMETHOPRIM/SULFAMETHOXAZOLE: SIGNIFICANT CHANGE UP
-  VANCOMYCIN: SIGNIFICANT CHANGE UP
A1C WITH ESTIMATED AVERAGE GLUCOSE RESULT: 6.3 % — HIGH (ref 4–5.6)
A1C WITH ESTIMATED AVERAGE GLUCOSE RESULT: 6.4 % — HIGH (ref 4–5.6)
ALBUMIN SERPL ELPH-MCNC: 1.9 G/DL — LOW (ref 3.3–5)
ALBUMIN SERPL ELPH-MCNC: 2 G/DL — LOW (ref 3.3–5)
ALBUMIN SERPL ELPH-MCNC: 2.2 G/DL — LOW (ref 3.3–5)
ALBUMIN SERPL ELPH-MCNC: 2.3 G/DL — LOW (ref 3.3–5)
ALBUMIN SERPL ELPH-MCNC: 2.6 G/DL — LOW (ref 3.3–5)
ALBUMIN SERPL ELPH-MCNC: 3.1 G/DL — LOW (ref 3.3–5)
ALBUMIN SERPL ELPH-MCNC: 3.1 G/DL — LOW (ref 3.3–5)
ALBUMIN SERPL ELPH-MCNC: 3.2 G/DL — LOW (ref 3.3–5)
ALBUMIN SERPL ELPH-MCNC: 3.6 G/DL — SIGNIFICANT CHANGE UP (ref 3.3–5)
ALBUMIN SERPL ELPH-MCNC: 3.7 G/DL
ALP BLD-CCNC: 70 U/L
ALP SERPL-CCNC: 125 U/L — HIGH (ref 40–120)
ALP SERPL-CCNC: 133 U/L — HIGH (ref 40–120)
ALP SERPL-CCNC: 139 U/L — HIGH (ref 40–120)
ALP SERPL-CCNC: 147 U/L — HIGH (ref 40–120)
ALP SERPL-CCNC: 154 U/L — HIGH (ref 40–120)
ALP SERPL-CCNC: 162 U/L — HIGH (ref 40–120)
ALP SERPL-CCNC: 166 U/L — HIGH (ref 40–120)
ALP SERPL-CCNC: 182 U/L — HIGH (ref 40–120)
ALP SERPL-CCNC: 186 U/L — HIGH (ref 40–120)
ALP SERPL-CCNC: 69 U/L — SIGNIFICANT CHANGE UP (ref 40–120)
ALP SERPL-CCNC: 70 U/L — SIGNIFICANT CHANGE UP (ref 40–120)
ALP SERPL-CCNC: 70 U/L — SIGNIFICANT CHANGE UP (ref 40–120)
ALP SERPL-CCNC: 91 U/L — SIGNIFICANT CHANGE UP (ref 40–120)
ALT FLD-CCNC: 103 U/L — HIGH (ref 10–45)
ALT FLD-CCNC: 104 U/L — HIGH (ref 10–45)
ALT FLD-CCNC: 110 U/L — HIGH (ref 10–45)
ALT FLD-CCNC: 112 U/L — HIGH (ref 10–45)
ALT FLD-CCNC: 117 U/L — HIGH (ref 10–45)
ALT FLD-CCNC: 125 U/L — HIGH (ref 10–45)
ALT FLD-CCNC: 68 U/L — HIGH (ref 10–45)
ALT FLD-CCNC: 71 U/L — HIGH (ref 10–45)
ALT FLD-CCNC: 77 U/L — HIGH (ref 10–45)
ALT FLD-CCNC: 80 U/L — HIGH (ref 10–45)
ALT FLD-CCNC: 83 U/L — HIGH (ref 10–45)
ALT FLD-CCNC: 86 U/L — HIGH (ref 10–45)
ALT FLD-CCNC: 87 U/L — HIGH (ref 10–45)
ALT SERPL-CCNC: 122 U/L
ANION GAP SERPL CALC-SCNC: 10 MMOL/L — SIGNIFICANT CHANGE UP (ref 5–17)
ANION GAP SERPL CALC-SCNC: 12 MMOL/L
ANION GAP SERPL CALC-SCNC: 12 MMOL/L — SIGNIFICANT CHANGE UP (ref 5–17)
ANION GAP SERPL CALC-SCNC: 12 MMOL/L — SIGNIFICANT CHANGE UP (ref 5–17)
ANION GAP SERPL CALC-SCNC: 13 MMOL/L — SIGNIFICANT CHANGE UP (ref 5–17)
ANION GAP SERPL CALC-SCNC: 14 MMOL/L — SIGNIFICANT CHANGE UP (ref 5–17)
ANION GAP SERPL CALC-SCNC: 15 MMOL/L — SIGNIFICANT CHANGE UP (ref 5–17)
ANION GAP SERPL CALC-SCNC: 15 MMOL/L — SIGNIFICANT CHANGE UP (ref 5–17)
ANION GAP SERPL CALC-SCNC: 16 MMOL/L — SIGNIFICANT CHANGE UP (ref 5–17)
ANION GAP SERPL CALC-SCNC: 16 MMOL/L — SIGNIFICANT CHANGE UP (ref 5–17)
ANION GAP SERPL CALC-SCNC: 22 MMOL/L — HIGH (ref 5–17)
ANION GAP SERPL CALC-SCNC: 22 MMOL/L — HIGH (ref 5–17)
ANION GAP SERPL CALC-SCNC: 24 MMOL/L — HIGH (ref 5–17)
APPEARANCE UR: ABNORMAL
APPEARANCE UR: CLEAR — SIGNIFICANT CHANGE UP
APTT BLD: 38.5 SEC — HIGH (ref 27.5–35.5)
AST SERPL-CCNC: 107 U/L — HIGH (ref 10–40)
AST SERPL-CCNC: 110 U/L — HIGH (ref 10–40)
AST SERPL-CCNC: 133 U/L — HIGH (ref 10–40)
AST SERPL-CCNC: 136 U/L — HIGH (ref 10–40)
AST SERPL-CCNC: 140 U/L — HIGH (ref 10–40)
AST SERPL-CCNC: 180 U/L — HIGH (ref 10–40)
AST SERPL-CCNC: 46 U/L — HIGH (ref 10–40)
AST SERPL-CCNC: 47 U/L — HIGH (ref 10–40)
AST SERPL-CCNC: 54 U/L — HIGH (ref 10–40)
AST SERPL-CCNC: 69 U/L — HIGH (ref 10–40)
AST SERPL-CCNC: 70 U/L — HIGH (ref 10–40)
AST SERPL-CCNC: 75 U/L — HIGH (ref 10–40)
AST SERPL-CCNC: 93 U/L
AST SERPL-CCNC: 94 U/L — HIGH (ref 10–40)
BACTERIA # UR AUTO: ABNORMAL
BACTERIA # UR AUTO: NEGATIVE — SIGNIFICANT CHANGE UP
BACTERIA UR CULT: NORMAL
BASE EXCESS BLDA CALC-SCNC: -3.3 MMOL/L — LOW (ref -2–3)
BASE EXCESS BLDV CALC-SCNC: -5.6 MMOL/L — LOW (ref -2–2)
BASOPHILS # BLD AUTO: 0 K/UL — SIGNIFICANT CHANGE UP (ref 0–0.2)
BASOPHILS # BLD AUTO: 0 K/UL — SIGNIFICANT CHANGE UP (ref 0–0.2)
BASOPHILS # BLD AUTO: 0.03 K/UL — SIGNIFICANT CHANGE UP (ref 0–0.2)
BASOPHILS # BLD AUTO: 0.04 K/UL — SIGNIFICANT CHANGE UP (ref 0–0.2)
BASOPHILS # BLD AUTO: 0.05 K/UL
BASOPHILS # BLD AUTO: 0.05 K/UL — SIGNIFICANT CHANGE UP (ref 0–0.2)
BASOPHILS # BLD AUTO: 0.09 K/UL — SIGNIFICANT CHANGE UP (ref 0–0.2)
BASOPHILS NFR BLD AUTO: 0 % — SIGNIFICANT CHANGE UP (ref 0–2)
BASOPHILS NFR BLD AUTO: 0 % — SIGNIFICANT CHANGE UP (ref 0–2)
BASOPHILS NFR BLD AUTO: 0.3 % — SIGNIFICANT CHANGE UP (ref 0–2)
BASOPHILS NFR BLD AUTO: 0.4 % — SIGNIFICANT CHANGE UP (ref 0–2)
BASOPHILS NFR BLD AUTO: 0.4 % — SIGNIFICANT CHANGE UP (ref 0–2)
BASOPHILS NFR BLD AUTO: 0.6 % — SIGNIFICANT CHANGE UP (ref 0–2)
BASOPHILS NFR BLD AUTO: 0.7 %
BILIRUB DIRECT SERPL-MCNC: 0.6 MG/DL — HIGH (ref 0–0.3)
BILIRUB INDIRECT FLD-MCNC: 0.3 MG/DL — SIGNIFICANT CHANGE UP (ref 0.2–1)
BILIRUB SERPL-MCNC: 0.4 MG/DL
BILIRUB SERPL-MCNC: 0.4 MG/DL — SIGNIFICANT CHANGE UP (ref 0.2–1.2)
BILIRUB SERPL-MCNC: 0.5 MG/DL — SIGNIFICANT CHANGE UP (ref 0.2–1.2)
BILIRUB SERPL-MCNC: 0.6 MG/DL — SIGNIFICANT CHANGE UP (ref 0.2–1.2)
BILIRUB SERPL-MCNC: 0.8 MG/DL — SIGNIFICANT CHANGE UP (ref 0.2–1.2)
BILIRUB SERPL-MCNC: 0.8 MG/DL — SIGNIFICANT CHANGE UP (ref 0.2–1.2)
BILIRUB SERPL-MCNC: 0.9 MG/DL — SIGNIFICANT CHANGE UP (ref 0.2–1.2)
BILIRUB SERPL-MCNC: 0.9 MG/DL — SIGNIFICANT CHANGE UP (ref 0.2–1.2)
BILIRUB UR-MCNC: NEGATIVE — SIGNIFICANT CHANGE UP
BUN SERPL-MCNC: 100 MG/DL — HIGH (ref 7–23)
BUN SERPL-MCNC: 119 MG/DL — HIGH (ref 7–23)
BUN SERPL-MCNC: 23 MG/DL
BUN SERPL-MCNC: 23 MG/DL — SIGNIFICANT CHANGE UP (ref 7–23)
BUN SERPL-MCNC: 24 MG/DL — HIGH (ref 7–23)
BUN SERPL-MCNC: 28 MG/DL — HIGH (ref 7–23)
BUN SERPL-MCNC: 30 MG/DL — HIGH (ref 7–23)
BUN SERPL-MCNC: 33 MG/DL — HIGH (ref 7–23)
BUN SERPL-MCNC: 39 MG/DL — HIGH (ref 7–23)
BUN SERPL-MCNC: 40 MG/DL — HIGH (ref 7–23)
BUN SERPL-MCNC: 46 MG/DL — HIGH (ref 7–23)
BUN SERPL-MCNC: 46 MG/DL — HIGH (ref 7–23)
BUN SERPL-MCNC: 62 MG/DL — HIGH (ref 7–23)
BUN SERPL-MCNC: 65 MG/DL — HIGH (ref 7–23)
BUN SERPL-MCNC: 69 MG/DL — HIGH (ref 7–23)
BUN SERPL-MCNC: 72 MG/DL — HIGH (ref 7–23)
BUN SERPL-MCNC: 78 MG/DL — HIGH (ref 7–23)
CA-I SERPL-SCNC: 1.24 MMOL/L — SIGNIFICANT CHANGE UP (ref 1.15–1.33)
CALCIUM SERPL-MCNC: 7.1 MG/DL — LOW (ref 8.4–10.5)
CALCIUM SERPL-MCNC: 7.8 MG/DL — LOW (ref 8.4–10.5)
CALCIUM SERPL-MCNC: 7.9 MG/DL — LOW (ref 8.4–10.5)
CALCIUM SERPL-MCNC: 8.1 MG/DL — LOW (ref 8.4–10.5)
CALCIUM SERPL-MCNC: 8.2 MG/DL — LOW (ref 8.4–10.5)
CALCIUM SERPL-MCNC: 8.2 MG/DL — LOW (ref 8.4–10.5)
CALCIUM SERPL-MCNC: 8.4 MG/DL — SIGNIFICANT CHANGE UP (ref 8.4–10.5)
CALCIUM SERPL-MCNC: 8.5 MG/DL — SIGNIFICANT CHANGE UP (ref 8.4–10.5)
CALCIUM SERPL-MCNC: 8.6 MG/DL
CALCIUM SERPL-MCNC: 8.6 MG/DL — SIGNIFICANT CHANGE UP (ref 8.4–10.5)
CALCIUM SERPL-MCNC: 8.6 MG/DL — SIGNIFICANT CHANGE UP (ref 8.4–10.5)
CALCIUM SERPL-MCNC: 8.7 MG/DL — SIGNIFICANT CHANGE UP (ref 8.4–10.5)
CALCIUM SERPL-MCNC: 8.8 MG/DL — SIGNIFICANT CHANGE UP (ref 8.4–10.5)
CALCIUM SERPL-MCNC: 8.8 MG/DL — SIGNIFICANT CHANGE UP (ref 8.4–10.5)
CALCIUM SERPL-MCNC: 9.5 MG/DL — SIGNIFICANT CHANGE UP (ref 8.4–10.5)
CHLORIDE BLDV-SCNC: 101 MMOL/L — SIGNIFICANT CHANGE UP (ref 96–108)
CHLORIDE SERPL-SCNC: 100 MMOL/L — SIGNIFICANT CHANGE UP (ref 96–108)
CHLORIDE SERPL-SCNC: 101 MMOL/L — SIGNIFICANT CHANGE UP (ref 96–108)
CHLORIDE SERPL-SCNC: 102 MMOL/L — SIGNIFICANT CHANGE UP (ref 96–108)
CHLORIDE SERPL-SCNC: 102 MMOL/L — SIGNIFICANT CHANGE UP (ref 96–108)
CHLORIDE SERPL-SCNC: 103 MMOL/L — SIGNIFICANT CHANGE UP (ref 96–108)
CHLORIDE SERPL-SCNC: 104 MMOL/L — SIGNIFICANT CHANGE UP (ref 96–108)
CHLORIDE SERPL-SCNC: 107 MMOL/L — SIGNIFICANT CHANGE UP (ref 96–108)
CHLORIDE SERPL-SCNC: 107 MMOL/L — SIGNIFICANT CHANGE UP (ref 96–108)
CHLORIDE SERPL-SCNC: 109 MMOL/L — HIGH (ref 96–108)
CHLORIDE SERPL-SCNC: 109 MMOL/L — HIGH (ref 96–108)
CHLORIDE SERPL-SCNC: 110 MMOL/L — HIGH (ref 96–108)
CHLORIDE SERPL-SCNC: 110 MMOL/L — HIGH (ref 96–108)
CHLORIDE SERPL-SCNC: 97 MMOL/L
CHLORIDE SERPL-SCNC: 97 MMOL/L — SIGNIFICANT CHANGE UP (ref 96–108)
CK MB CFR SERPL CALC: 1.8 NG/ML — SIGNIFICANT CHANGE UP (ref 0–6.7)
CK SERPL-CCNC: 37 U/L — SIGNIFICANT CHANGE UP (ref 30–200)
CO2 BLDA-SCNC: 21 MMOL/L — SIGNIFICANT CHANGE UP (ref 19–24)
CO2 BLDV-SCNC: 20 MMOL/L — LOW (ref 22–26)
CO2 SERPL-SCNC: 15 MMOL/L — LOW (ref 22–31)
CO2 SERPL-SCNC: 16 MMOL/L — LOW (ref 22–31)
CO2 SERPL-SCNC: 17 MMOL/L — LOW (ref 22–31)
CO2 SERPL-SCNC: 18 MMOL/L — LOW (ref 22–31)
CO2 SERPL-SCNC: 18 MMOL/L — LOW (ref 22–31)
CO2 SERPL-SCNC: 19 MMOL/L — LOW (ref 22–31)
CO2 SERPL-SCNC: 20 MMOL/L — LOW (ref 22–31)
CO2 SERPL-SCNC: 20 MMOL/L — LOW (ref 22–31)
CO2 SERPL-SCNC: 21 MMOL/L — LOW (ref 22–31)
CO2 SERPL-SCNC: 22 MMOL/L — SIGNIFICANT CHANGE UP (ref 22–31)
CO2 SERPL-SCNC: 23 MMOL/L — SIGNIFICANT CHANGE UP (ref 22–31)
CO2 SERPL-SCNC: 23 MMOL/L — SIGNIFICANT CHANGE UP (ref 22–31)
CO2 SERPL-SCNC: 24 MMOL/L
COLOR SPEC: ABNORMAL
COLOR SPEC: YELLOW — SIGNIFICANT CHANGE UP
CREAT SERPL-MCNC: 1.14 MG/DL — SIGNIFICANT CHANGE UP (ref 0.5–1.3)
CREAT SERPL-MCNC: 1.35 MG/DL — HIGH (ref 0.5–1.3)
CREAT SERPL-MCNC: 1.44 MG/DL — HIGH (ref 0.5–1.3)
CREAT SERPL-MCNC: 1.52 MG/DL — HIGH (ref 0.5–1.3)
CREAT SERPL-MCNC: 1.55 MG/DL — HIGH (ref 0.5–1.3)
CREAT SERPL-MCNC: 1.61 MG/DL — HIGH (ref 0.5–1.3)
CREAT SERPL-MCNC: 1.61 MG/DL — HIGH (ref 0.5–1.3)
CREAT SERPL-MCNC: 1.7 MG/DL — HIGH (ref 0.5–1.3)
CREAT SERPL-MCNC: 1.74 MG/DL
CREAT SERPL-MCNC: 1.75 MG/DL — HIGH (ref 0.5–1.3)
CREAT SERPL-MCNC: 1.82 MG/DL — HIGH (ref 0.5–1.3)
CREAT SERPL-MCNC: 1.85 MG/DL — HIGH (ref 0.5–1.3)
CREAT SERPL-MCNC: 1.92 MG/DL — HIGH (ref 0.5–1.3)
CREAT SERPL-MCNC: 1.95 MG/DL — HIGH (ref 0.5–1.3)
CREAT SERPL-MCNC: 2.05 MG/DL — HIGH (ref 0.5–1.3)
CREAT SERPL-MCNC: 2.13 MG/DL — HIGH (ref 0.5–1.3)
CREAT SERPL-MCNC: 2.39 MG/DL — HIGH (ref 0.5–1.3)
CREAT SERPL-MCNC: 3.05 MG/DL — HIGH (ref 0.5–1.3)
CREAT SERPL-MCNC: 4.38 MG/DL — HIGH (ref 0.5–1.3)
CRP SERPL-MCNC: 101 MG/L — HIGH (ref 0–4)
CRP SERPL-MCNC: 192 MG/L — HIGH (ref 0–4)
CULTURE RESULTS: SIGNIFICANT CHANGE UP
D DIMER BLD IA.RAPID-MCNC: 504 NG/ML DDU — HIGH
D DIMER BLD IA.RAPID-MCNC: 541 NG/ML DDU — HIGH
D DIMER BLD IA.RAPID-MCNC: 672 NG/ML DDU — HIGH
D DIMER BLD IA.RAPID-MCNC: 809 NG/ML DDU — HIGH
DIFF PNL FLD: ABNORMAL
DIFF PNL FLD: ABNORMAL
DIFF PNL FLD: NEGATIVE — SIGNIFICANT CHANGE UP
DIFF PNL FLD: NEGATIVE — SIGNIFICANT CHANGE UP
EOSINOPHIL # BLD AUTO: 0 K/UL — SIGNIFICANT CHANGE UP (ref 0–0.5)
EOSINOPHIL # BLD AUTO: 0 K/UL — SIGNIFICANT CHANGE UP (ref 0–0.5)
EOSINOPHIL # BLD AUTO: 0.03 K/UL — SIGNIFICANT CHANGE UP (ref 0–0.5)
EOSINOPHIL # BLD AUTO: 0.05 K/UL — SIGNIFICANT CHANGE UP (ref 0–0.5)
EOSINOPHIL # BLD AUTO: 0.24 K/UL — SIGNIFICANT CHANGE UP (ref 0–0.5)
EOSINOPHIL # BLD AUTO: 0.3 K/UL — SIGNIFICANT CHANGE UP (ref 0–0.5)
EOSINOPHIL # BLD AUTO: 0.34 K/UL
EOSINOPHIL NFR BLD AUTO: 0 % — SIGNIFICANT CHANGE UP (ref 0–6)
EOSINOPHIL NFR BLD AUTO: 0 % — SIGNIFICANT CHANGE UP (ref 0–6)
EOSINOPHIL NFR BLD AUTO: 0.2 % — SIGNIFICANT CHANGE UP (ref 0–6)
EOSINOPHIL NFR BLD AUTO: 0.4 % — SIGNIFICANT CHANGE UP (ref 0–6)
EOSINOPHIL NFR BLD AUTO: 2.1 % — SIGNIFICANT CHANGE UP (ref 0–6)
EOSINOPHIL NFR BLD AUTO: 3.1 % — SIGNIFICANT CHANGE UP (ref 0–6)
EOSINOPHIL NFR BLD AUTO: 4.4 %
EPI CELLS # UR: 0 /HPF — SIGNIFICANT CHANGE UP
EPI CELLS # UR: 8 /HPF — HIGH
ESTIMATED AVERAGE GLUCOSE: 134 MG/DL — HIGH (ref 68–114)
ESTIMATED AVERAGE GLUCOSE: 137 MG/DL — HIGH (ref 68–114)
FERRITIN SERPL-MCNC: 986 NG/ML — HIGH (ref 30–400)
FERRITIN SERPL-MCNC: 990 NG/ML — HIGH (ref 30–400)
GAS PNL BLDA: SIGNIFICANT CHANGE UP
GAS PNL BLDV: 130 MMOL/L — LOW (ref 136–145)
GAS PNL BLDV: SIGNIFICANT CHANGE UP
GLUCOSE BLDV-MCNC: 170 MG/DL — HIGH (ref 70–99)
GLUCOSE SERPL-MCNC: 113 MG/DL — HIGH (ref 70–99)
GLUCOSE SERPL-MCNC: 114 MG/DL — HIGH (ref 70–99)
GLUCOSE SERPL-MCNC: 132 MG/DL — HIGH (ref 70–99)
GLUCOSE SERPL-MCNC: 137 MG/DL
GLUCOSE SERPL-MCNC: 154 MG/DL — HIGH (ref 70–99)
GLUCOSE SERPL-MCNC: 167 MG/DL — HIGH (ref 70–99)
GLUCOSE SERPL-MCNC: 174 MG/DL — HIGH (ref 70–99)
GLUCOSE SERPL-MCNC: 182 MG/DL — HIGH (ref 70–99)
GLUCOSE SERPL-MCNC: 190 MG/DL — HIGH (ref 70–99)
GLUCOSE SERPL-MCNC: 195 MG/DL — HIGH (ref 70–99)
GLUCOSE SERPL-MCNC: 203 MG/DL — HIGH (ref 70–99)
GLUCOSE SERPL-MCNC: 214 MG/DL — HIGH (ref 70–99)
GLUCOSE SERPL-MCNC: 220 MG/DL — HIGH (ref 70–99)
GLUCOSE SERPL-MCNC: 236 MG/DL — HIGH (ref 70–99)
GLUCOSE SERPL-MCNC: 246 MG/DL — HIGH (ref 70–99)
GLUCOSE SERPL-MCNC: 613 MG/DL — CRITICAL HIGH (ref 70–99)
GLUCOSE SERPL-MCNC: 92 MG/DL — SIGNIFICANT CHANGE UP (ref 70–99)
GLUCOSE SERPL-MCNC: 95 MG/DL — SIGNIFICANT CHANGE UP (ref 70–99)
GLUCOSE SERPL-MCNC: 99 MG/DL — SIGNIFICANT CHANGE UP (ref 70–99)
GLUCOSE UR QL: ABNORMAL
GLUCOSE UR QL: NEGATIVE — SIGNIFICANT CHANGE UP
GRAM STN FLD: SIGNIFICANT CHANGE UP
HAV IGM SER-ACNC: SIGNIFICANT CHANGE UP
HAV IGM SER-ACNC: SIGNIFICANT CHANGE UP
HBV CORE IGM SER-ACNC: SIGNIFICANT CHANGE UP
HBV CORE IGM SER-ACNC: SIGNIFICANT CHANGE UP
HBV SURFACE AG SER-ACNC: SIGNIFICANT CHANGE UP
HBV SURFACE AG SER-ACNC: SIGNIFICANT CHANGE UP
HCO3 BLDA-SCNC: 20 MMOL/L — LOW (ref 21–28)
HCO3 BLDV-SCNC: 19 MMOL/L — LOW (ref 22–29)
HCT VFR BLD CALC: 34.5 % — LOW (ref 39–50)
HCT VFR BLD CALC: 35.7 % — LOW (ref 39–50)
HCT VFR BLD CALC: 36.1 % — LOW (ref 39–50)
HCT VFR BLD CALC: 36.3 % — LOW (ref 39–50)
HCT VFR BLD CALC: 36.9 % — LOW (ref 39–50)
HCT VFR BLD CALC: 37 % — LOW (ref 39–50)
HCT VFR BLD CALC: 37 % — LOW (ref 39–50)
HCT VFR BLD CALC: 37.3 % — LOW (ref 39–50)
HCT VFR BLD CALC: 37.5 % — LOW (ref 39–50)
HCT VFR BLD CALC: 37.5 % — LOW (ref 39–50)
HCT VFR BLD CALC: 37.8 % — LOW (ref 39–50)
HCT VFR BLD CALC: 37.9 % — LOW (ref 39–50)
HCT VFR BLD CALC: 38.1 % — LOW (ref 39–50)
HCT VFR BLD CALC: 38.4 % — LOW (ref 39–50)
HCT VFR BLD CALC: 38.9 %
HCT VFR BLD CALC: 39.3 % — SIGNIFICANT CHANGE UP (ref 39–50)
HCT VFR BLD CALC: 39.9 % — SIGNIFICANT CHANGE UP (ref 39–50)
HCT VFR BLD CALC: 42.5 % — SIGNIFICANT CHANGE UP (ref 39–50)
HCT VFR BLD CALC: 42.5 % — SIGNIFICANT CHANGE UP (ref 39–50)
HCT VFR BLDA CALC: 39 % — SIGNIFICANT CHANGE UP (ref 39–51)
HCV AB S/CO SERPL IA: 0.86 S/CO — SIGNIFICANT CHANGE UP (ref 0–0.99)
HCV AB S/CO SERPL IA: 2.57 S/CO — HIGH (ref 0–0.99)
HCV AB SERPL-IMP: ABNORMAL
HCV AB SERPL-IMP: SIGNIFICANT CHANGE UP
HGB BLD CALC-MCNC: 13.1 G/DL — SIGNIFICANT CHANGE UP (ref 12.6–17.4)
HGB BLD-MCNC: 11.9 G/DL — LOW (ref 13–17)
HGB BLD-MCNC: 12.3 G/DL — LOW (ref 13–17)
HGB BLD-MCNC: 12.5 G/DL — LOW (ref 13–17)
HGB BLD-MCNC: 12.6 G/DL — LOW (ref 13–17)
HGB BLD-MCNC: 12.7 G/DL — LOW (ref 13–17)
HGB BLD-MCNC: 12.8 G/DL — LOW (ref 13–17)
HGB BLD-MCNC: 12.8 G/DL — LOW (ref 13–17)
HGB BLD-MCNC: 12.9 G/DL
HGB BLD-MCNC: 12.9 G/DL — LOW (ref 13–17)
HGB BLD-MCNC: 13 G/DL — SIGNIFICANT CHANGE UP (ref 13–17)
HGB BLD-MCNC: 13.1 G/DL — SIGNIFICANT CHANGE UP (ref 13–17)
HGB BLD-MCNC: 13.6 G/DL — SIGNIFICANT CHANGE UP (ref 13–17)
HGB BLD-MCNC: 13.6 G/DL — SIGNIFICANT CHANGE UP (ref 13–17)
HGB BLD-MCNC: 13.9 G/DL — SIGNIFICANT CHANGE UP (ref 13–17)
HGB BLD-MCNC: 14.2 G/DL — SIGNIFICANT CHANGE UP (ref 13–17)
HOROWITZ INDEX BLDA+IHG-RTO: 90 — SIGNIFICANT CHANGE UP
HYALINE CASTS # UR AUTO: 4 /LPF — HIGH (ref 0–2)
HYALINE CASTS # UR AUTO: 4 /LPF — HIGH (ref 0–2)
HYALINE CASTS # UR AUTO: 7 /LPF — SIGNIFICANT CHANGE UP (ref 0–7)
HYALINE CASTS # UR AUTO: 9 /LPF — HIGH (ref 0–2)
IMM GRANULOCYTES NFR BLD AUTO: 0.5 % — SIGNIFICANT CHANGE UP (ref 0–1.5)
IMM GRANULOCYTES NFR BLD AUTO: 0.7 %
IMM GRANULOCYTES NFR BLD AUTO: 1 % — SIGNIFICANT CHANGE UP (ref 0–1.5)
IMM GRANULOCYTES NFR BLD AUTO: 1.3 % — SIGNIFICANT CHANGE UP (ref 0–1.5)
IMM GRANULOCYTES NFR BLD AUTO: 2.7 % — HIGH (ref 0–1.5)
INR BLD: 1.48 RATIO — HIGH (ref 0.88–1.16)
KETONES UR-MCNC: NEGATIVE — SIGNIFICANT CHANGE UP
LACTATE BLDV-MCNC: 3.2 MMOL/L — HIGH (ref 0.7–2)
LEUKOCYTE ESTERASE UR-ACNC: ABNORMAL
LEUKOCYTE ESTERASE UR-ACNC: NEGATIVE — SIGNIFICANT CHANGE UP
LYMPHOCYTES # BLD AUTO: 0.14 K/UL — LOW (ref 1–3.3)
LYMPHOCYTES # BLD AUTO: 0.33 K/UL — LOW (ref 1–3.3)
LYMPHOCYTES # BLD AUTO: 0.8 % — LOW (ref 13–44)
LYMPHOCYTES # BLD AUTO: 1.13 K/UL — SIGNIFICANT CHANGE UP (ref 1–3.3)
LYMPHOCYTES # BLD AUTO: 1.7 % — LOW (ref 13–44)
LYMPHOCYTES # BLD AUTO: 1.85 K/UL
LYMPHOCYTES # BLD AUTO: 17.7 % — SIGNIFICANT CHANGE UP (ref 13–44)
LYMPHOCYTES # BLD AUTO: 2.05 K/UL — SIGNIFICANT CHANGE UP (ref 1–3.3)
LYMPHOCYTES # BLD AUTO: 2.27 K/UL — SIGNIFICANT CHANGE UP (ref 1–3.3)
LYMPHOCYTES # BLD AUTO: 2.52 K/UL — SIGNIFICANT CHANGE UP (ref 1–3.3)
LYMPHOCYTES # BLD AUTO: 21.1 % — SIGNIFICANT CHANGE UP (ref 13–44)
LYMPHOCYTES # BLD AUTO: 23.7 % — SIGNIFICANT CHANGE UP (ref 13–44)
LYMPHOCYTES # BLD AUTO: 7.4 % — LOW (ref 13–44)
LYMPHOCYTES NFR BLD AUTO: 24.1 %
MAGNESIUM SERPL-MCNC: 1.8 MG/DL — SIGNIFICANT CHANGE UP (ref 1.6–2.6)
MAGNESIUM SERPL-MCNC: 1.8 MG/DL — SIGNIFICANT CHANGE UP (ref 1.6–2.6)
MAN DIFF?: NORMAL
MCHC RBC-ENTMCNC: 31.9 PG — SIGNIFICANT CHANGE UP (ref 27–34)
MCHC RBC-ENTMCNC: 32 GM/DL — SIGNIFICANT CHANGE UP (ref 32–36)
MCHC RBC-ENTMCNC: 32 PG — SIGNIFICANT CHANGE UP (ref 27–34)
MCHC RBC-ENTMCNC: 32 PG — SIGNIFICANT CHANGE UP (ref 27–34)
MCHC RBC-ENTMCNC: 32.1 PG — SIGNIFICANT CHANGE UP (ref 27–34)
MCHC RBC-ENTMCNC: 32.1 PG — SIGNIFICANT CHANGE UP (ref 27–34)
MCHC RBC-ENTMCNC: 32.2 PG
MCHC RBC-ENTMCNC: 32.6 PG — SIGNIFICANT CHANGE UP (ref 27–34)
MCHC RBC-ENTMCNC: 32.6 PG — SIGNIFICANT CHANGE UP (ref 27–34)
MCHC RBC-ENTMCNC: 32.7 PG — SIGNIFICANT CHANGE UP (ref 27–34)
MCHC RBC-ENTMCNC: 32.8 PG — SIGNIFICANT CHANGE UP (ref 27–34)
MCHC RBC-ENTMCNC: 32.9 PG — SIGNIFICANT CHANGE UP (ref 27–34)
MCHC RBC-ENTMCNC: 32.9 PG — SIGNIFICANT CHANGE UP (ref 27–34)
MCHC RBC-ENTMCNC: 33 PG — SIGNIFICANT CHANGE UP (ref 27–34)
MCHC RBC-ENTMCNC: 33.1 GM/DL — SIGNIFICANT CHANGE UP (ref 32–36)
MCHC RBC-ENTMCNC: 33.2 GM/DL
MCHC RBC-ENTMCNC: 33.2 PG — SIGNIFICANT CHANGE UP (ref 27–34)
MCHC RBC-ENTMCNC: 33.4 GM/DL — SIGNIFICANT CHANGE UP (ref 32–36)
MCHC RBC-ENTMCNC: 33.6 GM/DL — SIGNIFICANT CHANGE UP (ref 32–36)
MCHC RBC-ENTMCNC: 33.7 PG — SIGNIFICANT CHANGE UP (ref 27–34)
MCHC RBC-ENTMCNC: 33.8 GM/DL — SIGNIFICANT CHANGE UP (ref 32–36)
MCHC RBC-ENTMCNC: 33.8 GM/DL — SIGNIFICANT CHANGE UP (ref 32–36)
MCHC RBC-ENTMCNC: 33.9 GM/DL — SIGNIFICANT CHANGE UP (ref 32–36)
MCHC RBC-ENTMCNC: 34.1 GM/DL — SIGNIFICANT CHANGE UP (ref 32–36)
MCHC RBC-ENTMCNC: 34.1 GM/DL — SIGNIFICANT CHANGE UP (ref 32–36)
MCHC RBC-ENTMCNC: 34.4 GM/DL — SIGNIFICANT CHANGE UP (ref 32–36)
MCHC RBC-ENTMCNC: 34.5 GM/DL — SIGNIFICANT CHANGE UP (ref 32–36)
MCHC RBC-ENTMCNC: 34.6 GM/DL — SIGNIFICANT CHANGE UP (ref 32–36)
MCHC RBC-ENTMCNC: 34.8 GM/DL — SIGNIFICANT CHANGE UP (ref 32–36)
MCHC RBC-ENTMCNC: 34.9 GM/DL — SIGNIFICANT CHANGE UP (ref 32–36)
MCHC RBC-ENTMCNC: 35 GM/DL — SIGNIFICANT CHANGE UP (ref 32–36)
MCHC RBC-ENTMCNC: 35 GM/DL — SIGNIFICANT CHANGE UP (ref 32–36)
MCHC RBC-ENTMCNC: 35.3 GM/DL — SIGNIFICANT CHANGE UP (ref 32–36)
MCHC RBC-ENTMCNC: 35.4 GM/DL — SIGNIFICANT CHANGE UP (ref 32–36)
MCV RBC AUTO: 103.7 FL — HIGH (ref 80–100)
MCV RBC AUTO: 92.7 FL — SIGNIFICANT CHANGE UP (ref 80–100)
MCV RBC AUTO: 92.7 FL — SIGNIFICANT CHANGE UP (ref 80–100)
MCV RBC AUTO: 94 FL — SIGNIFICANT CHANGE UP (ref 80–100)
MCV RBC AUTO: 94.1 FL — SIGNIFICANT CHANGE UP (ref 80–100)
MCV RBC AUTO: 94.5 FL — SIGNIFICANT CHANGE UP (ref 80–100)
MCV RBC AUTO: 94.5 FL — SIGNIFICANT CHANGE UP (ref 80–100)
MCV RBC AUTO: 94.6 FL — SIGNIFICANT CHANGE UP (ref 80–100)
MCV RBC AUTO: 94.7 FL — SIGNIFICANT CHANGE UP (ref 80–100)
MCV RBC AUTO: 94.8 FL — SIGNIFICANT CHANGE UP (ref 80–100)
MCV RBC AUTO: 95 FL — SIGNIFICANT CHANGE UP (ref 80–100)
MCV RBC AUTO: 95 FL — SIGNIFICANT CHANGE UP (ref 80–100)
MCV RBC AUTO: 95.9 FL — SIGNIFICANT CHANGE UP (ref 80–100)
MCV RBC AUTO: 95.9 FL — SIGNIFICANT CHANGE UP (ref 80–100)
MCV RBC AUTO: 96.7 FL — SIGNIFICANT CHANGE UP (ref 80–100)
MCV RBC AUTO: 96.8 FL — SIGNIFICANT CHANGE UP (ref 80–100)
MCV RBC AUTO: 97 FL
MCV RBC AUTO: 97.3 FL — SIGNIFICANT CHANGE UP (ref 80–100)
MCV RBC AUTO: 98.2 FL — SIGNIFICANT CHANGE UP (ref 80–100)
METHOD TYPE: SIGNIFICANT CHANGE UP
MONOCYTES # BLD AUTO: 0.33 K/UL — SIGNIFICANT CHANGE UP (ref 0–0.9)
MONOCYTES # BLD AUTO: 0.39 K/UL — SIGNIFICANT CHANGE UP (ref 0–0.9)
MONOCYTES # BLD AUTO: 0.64 K/UL
MONOCYTES # BLD AUTO: 0.75 K/UL — SIGNIFICANT CHANGE UP (ref 0–0.9)
MONOCYTES # BLD AUTO: 0.75 K/UL — SIGNIFICANT CHANGE UP (ref 0–0.9)
MONOCYTES # BLD AUTO: 0.88 K/UL — SIGNIFICANT CHANGE UP (ref 0–0.9)
MONOCYTES # BLD AUTO: 1.17 K/UL — HIGH (ref 0–0.9)
MONOCYTES NFR BLD AUTO: 1.7 % — LOW (ref 2–14)
MONOCYTES NFR BLD AUTO: 10.1 % — SIGNIFICANT CHANGE UP (ref 2–14)
MONOCYTES NFR BLD AUTO: 2.5 % — SIGNIFICANT CHANGE UP (ref 2–14)
MONOCYTES NFR BLD AUTO: 4.3 % — SIGNIFICANT CHANGE UP (ref 2–14)
MONOCYTES NFR BLD AUTO: 6.3 % — SIGNIFICANT CHANGE UP (ref 2–14)
MONOCYTES NFR BLD AUTO: 8.3 %
MONOCYTES NFR BLD AUTO: 9.2 % — SIGNIFICANT CHANGE UP (ref 2–14)
MRSA PCR RESULT.: DETECTED
MRSA SPEC QL CULT: SIGNIFICANT CHANGE UP
NEUTROPHILS # BLD AUTO: 13.29 K/UL — HIGH (ref 1.8–7.4)
NEUTROPHILS # BLD AUTO: 16.13 K/UL — HIGH (ref 1.8–7.4)
NEUTROPHILS # BLD AUTO: 18.49 K/UL — HIGH (ref 1.8–7.4)
NEUTROPHILS # BLD AUTO: 4.75 K/UL
NEUTROPHILS # BLD AUTO: 6.03 K/UL — SIGNIFICANT CHANGE UP (ref 1.8–7.4)
NEUTROPHILS # BLD AUTO: 7.98 K/UL — HIGH (ref 1.8–7.4)
NEUTROPHILS # BLD AUTO: 8.47 K/UL — HIGH (ref 1.8–7.4)
NEUTROPHILS NFR BLD AUTO: 61.8 %
NEUTROPHILS NFR BLD AUTO: 63.1 % — SIGNIFICANT CHANGE UP (ref 43–77)
NEUTROPHILS NFR BLD AUTO: 68.7 % — SIGNIFICANT CHANGE UP (ref 43–77)
NEUTROPHILS NFR BLD AUTO: 70.6 % — SIGNIFICANT CHANGE UP (ref 43–77)
NEUTROPHILS NFR BLD AUTO: 86.6 % — HIGH (ref 43–77)
NEUTROPHILS NFR BLD AUTO: 92.2 % — HIGH (ref 43–77)
NEUTROPHILS NFR BLD AUTO: 95.7 % — HIGH (ref 43–77)
NITRITE UR-MCNC: NEGATIVE — SIGNIFICANT CHANGE UP
NITRITE UR-MCNC: NEGATIVE — SIGNIFICANT CHANGE UP
NITRITE UR-MCNC: POSITIVE
NITRITE UR-MCNC: POSITIVE
NRBC # BLD: 0 /100 WBCS — SIGNIFICANT CHANGE UP (ref 0–0)
ORGANISM # SPEC MICROSCOPIC CNT: SIGNIFICANT CHANGE UP
PCO2 BLDA: 30 MMHG — LOW (ref 35–48)
PCO2 BLDV: 35 MMHG — LOW (ref 42–55)
PH BLDA: 7.43 — SIGNIFICANT CHANGE UP (ref 7.35–7.45)
PH BLDV: 7.35 — SIGNIFICANT CHANGE UP (ref 7.32–7.43)
PH UR: 6 — SIGNIFICANT CHANGE UP (ref 5–8)
PH UR: 8 — SIGNIFICANT CHANGE UP (ref 5–8)
PHOSPHATE SERPL-MCNC: 2.9 MG/DL — SIGNIFICANT CHANGE UP (ref 2.5–4.5)
PLATELET # BLD AUTO: 204 K/UL — SIGNIFICANT CHANGE UP (ref 150–400)
PLATELET # BLD AUTO: 217 K/UL — SIGNIFICANT CHANGE UP (ref 150–400)
PLATELET # BLD AUTO: 224 K/UL — SIGNIFICANT CHANGE UP (ref 150–400)
PLATELET # BLD AUTO: 234 K/UL — SIGNIFICANT CHANGE UP (ref 150–400)
PLATELET # BLD AUTO: 235 K/UL — SIGNIFICANT CHANGE UP (ref 150–400)
PLATELET # BLD AUTO: 236 K/UL — SIGNIFICANT CHANGE UP (ref 150–400)
PLATELET # BLD AUTO: 237 K/UL — SIGNIFICANT CHANGE UP (ref 150–400)
PLATELET # BLD AUTO: 238 K/UL
PLATELET # BLD AUTO: 244 K/UL — SIGNIFICANT CHANGE UP (ref 150–400)
PLATELET # BLD AUTO: 247 K/UL — SIGNIFICANT CHANGE UP (ref 150–400)
PLATELET # BLD AUTO: 248 K/UL — SIGNIFICANT CHANGE UP (ref 150–400)
PLATELET # BLD AUTO: 251 K/UL — SIGNIFICANT CHANGE UP (ref 150–400)
PLATELET # BLD AUTO: 252 K/UL — SIGNIFICANT CHANGE UP (ref 150–400)
PLATELET # BLD AUTO: 257 K/UL — SIGNIFICANT CHANGE UP (ref 150–400)
PLATELET # BLD AUTO: 257 K/UL — SIGNIFICANT CHANGE UP (ref 150–400)
PLATELET # BLD AUTO: 264 K/UL — SIGNIFICANT CHANGE UP (ref 150–400)
PLATELET # BLD AUTO: 287 K/UL — SIGNIFICANT CHANGE UP (ref 150–400)
PLATELET # BLD AUTO: 287 K/UL — SIGNIFICANT CHANGE UP (ref 150–400)
PLATELET # BLD AUTO: 290 K/UL — SIGNIFICANT CHANGE UP (ref 150–400)
PO2 BLDA: 64 MMHG — LOW (ref 83–108)
PO2 BLDV: 39 MMHG — SIGNIFICANT CHANGE UP (ref 25–45)
POTASSIUM BLDV-SCNC: 3.7 MMOL/L — SIGNIFICANT CHANGE UP (ref 3.5–5.1)
POTASSIUM SERPL-MCNC: 3.6 MMOL/L — SIGNIFICANT CHANGE UP (ref 3.5–5.3)
POTASSIUM SERPL-MCNC: 3.6 MMOL/L — SIGNIFICANT CHANGE UP (ref 3.5–5.3)
POTASSIUM SERPL-MCNC: 3.8 MMOL/L — SIGNIFICANT CHANGE UP (ref 3.5–5.3)
POTASSIUM SERPL-MCNC: 3.8 MMOL/L — SIGNIFICANT CHANGE UP (ref 3.5–5.3)
POTASSIUM SERPL-MCNC: 3.9 MMOL/L — SIGNIFICANT CHANGE UP (ref 3.5–5.3)
POTASSIUM SERPL-MCNC: 4 MMOL/L — SIGNIFICANT CHANGE UP (ref 3.5–5.3)
POTASSIUM SERPL-MCNC: 4.1 MMOL/L — SIGNIFICANT CHANGE UP (ref 3.5–5.3)
POTASSIUM SERPL-MCNC: 4.2 MMOL/L — SIGNIFICANT CHANGE UP (ref 3.5–5.3)
POTASSIUM SERPL-MCNC: 4.3 MMOL/L — SIGNIFICANT CHANGE UP (ref 3.5–5.3)
POTASSIUM SERPL-MCNC: 4.4 MMOL/L — SIGNIFICANT CHANGE UP (ref 3.5–5.3)
POTASSIUM SERPL-MCNC: 4.4 MMOL/L — SIGNIFICANT CHANGE UP (ref 3.5–5.3)
POTASSIUM SERPL-MCNC: 4.7 MMOL/L — SIGNIFICANT CHANGE UP (ref 3.5–5.3)
POTASSIUM SERPL-SCNC: 3.6 MMOL/L — SIGNIFICANT CHANGE UP (ref 3.5–5.3)
POTASSIUM SERPL-SCNC: 3.6 MMOL/L — SIGNIFICANT CHANGE UP (ref 3.5–5.3)
POTASSIUM SERPL-SCNC: 3.8 MMOL/L — SIGNIFICANT CHANGE UP (ref 3.5–5.3)
POTASSIUM SERPL-SCNC: 3.8 MMOL/L — SIGNIFICANT CHANGE UP (ref 3.5–5.3)
POTASSIUM SERPL-SCNC: 3.9 MMOL/L — SIGNIFICANT CHANGE UP (ref 3.5–5.3)
POTASSIUM SERPL-SCNC: 4 MMOL/L — SIGNIFICANT CHANGE UP (ref 3.5–5.3)
POTASSIUM SERPL-SCNC: 4.1 MMOL/L — SIGNIFICANT CHANGE UP (ref 3.5–5.3)
POTASSIUM SERPL-SCNC: 4.2 MMOL/L — SIGNIFICANT CHANGE UP (ref 3.5–5.3)
POTASSIUM SERPL-SCNC: 4.3 MMOL/L — SIGNIFICANT CHANGE UP (ref 3.5–5.3)
POTASSIUM SERPL-SCNC: 4.4 MMOL/L — SIGNIFICANT CHANGE UP (ref 3.5–5.3)
POTASSIUM SERPL-SCNC: 4.4 MMOL/L — SIGNIFICANT CHANGE UP (ref 3.5–5.3)
POTASSIUM SERPL-SCNC: 4.7 MMOL/L — SIGNIFICANT CHANGE UP (ref 3.5–5.3)
POTASSIUM SERPL-SCNC: 5 MMOL/L
PROCALCITONIN SERPL-MCNC: 0.65 NG/ML — HIGH (ref 0.02–0.1)
PROCALCITONIN SERPL-MCNC: 0.66 NG/ML — HIGH (ref 0.02–0.1)
PROT SERPL-MCNC: 5.4 G/DL — LOW (ref 6–8.3)
PROT SERPL-MCNC: 5.7 G/DL — LOW (ref 6–8.3)
PROT SERPL-MCNC: 5.7 G/DL — LOW (ref 6–8.3)
PROT SERPL-MCNC: 6 G/DL — SIGNIFICANT CHANGE UP (ref 6–8.3)
PROT SERPL-MCNC: 6.1 G/DL — SIGNIFICANT CHANGE UP (ref 6–8.3)
PROT SERPL-MCNC: 6.2 G/DL — SIGNIFICANT CHANGE UP (ref 6–8.3)
PROT SERPL-MCNC: 6.3 G/DL — SIGNIFICANT CHANGE UP (ref 6–8.3)
PROT SERPL-MCNC: 6.5 G/DL
PROT SERPL-MCNC: 7.4 G/DL — SIGNIFICANT CHANGE UP (ref 6–8.3)
PROT UR-MCNC: ABNORMAL
PROTHROM AB SERPL-ACNC: 17.4 SEC — HIGH (ref 10.6–13.6)
RAPID RVP RESULT: DETECTED
RAPID RVP RESULT: DETECTED
RBC # BLD: 3.65 M/UL — LOW (ref 4.2–5.8)
RBC # BLD: 3.73 M/UL — LOW (ref 4.2–5.8)
RBC # BLD: 3.82 M/UL — LOW (ref 4.2–5.8)
RBC # BLD: 3.85 M/UL — LOW (ref 4.2–5.8)
RBC # BLD: 3.86 M/UL — LOW (ref 4.2–5.8)
RBC # BLD: 3.91 M/UL — LOW (ref 4.2–5.8)
RBC # BLD: 3.91 M/UL — LOW (ref 4.2–5.8)
RBC # BLD: 3.92 M/UL — LOW (ref 4.2–5.8)
RBC # BLD: 3.96 M/UL — LOW (ref 4.2–5.8)
RBC # BLD: 3.97 M/UL — LOW (ref 4.2–5.8)
RBC # BLD: 3.97 M/UL — LOW (ref 4.2–5.8)
RBC # BLD: 3.98 M/UL — LOW (ref 4.2–5.8)
RBC # BLD: 3.99 M/UL — LOW (ref 4.2–5.8)
RBC # BLD: 4.01 M/UL
RBC # BLD: 4.02 M/UL — LOW (ref 4.2–5.8)
RBC # BLD: 4.04 M/UL — LOW (ref 4.2–5.8)
RBC # BLD: 4.1 M/UL — LOW (ref 4.2–5.8)
RBC # BLD: 4.22 M/UL — SIGNIFICANT CHANGE UP (ref 4.2–5.8)
RBC # BLD: 4.43 M/UL — SIGNIFICANT CHANGE UP (ref 4.2–5.8)
RBC # FLD: 12.4 % — SIGNIFICANT CHANGE UP (ref 10.3–14.5)
RBC # FLD: 12.5 % — SIGNIFICANT CHANGE UP (ref 10.3–14.5)
RBC # FLD: 12.6 %
RBC # FLD: 12.6 % — SIGNIFICANT CHANGE UP (ref 10.3–14.5)
RBC # FLD: 14.4 % — SIGNIFICANT CHANGE UP (ref 10.3–14.5)
RBC # FLD: 15.4 % — HIGH (ref 10.3–14.5)
RBC # FLD: 15.6 % — HIGH (ref 10.3–14.5)
RBC # FLD: 15.7 % — HIGH (ref 10.3–14.5)
RBC # FLD: 15.8 % — HIGH (ref 10.3–14.5)
RBC # FLD: 16 % — HIGH (ref 10.3–14.5)
RBC # FLD: 16.1 % — HIGH (ref 10.3–14.5)
RBC # FLD: 16.4 % — HIGH (ref 10.3–14.5)
RBC CASTS # UR COMP ASSIST: 1 /HPF — SIGNIFICANT CHANGE UP (ref 0–4)
RBC CASTS # UR COMP ASSIST: 1 /HPF — SIGNIFICANT CHANGE UP (ref 0–4)
RBC CASTS # UR COMP ASSIST: 13 /HPF — HIGH (ref 0–4)
RBC CASTS # UR COMP ASSIST: 308 /HPF — HIGH (ref 0–4)
S AUREUS DNA NOSE QL NAA+PROBE: DETECTED
SAO2 % BLDA: 93.4 % — LOW (ref 94–98)
SAO2 % BLDV: 66.7 % — LOW (ref 67–88)
SARS-COV-2 IGG SERPL QL IA: NEGATIVE — SIGNIFICANT CHANGE UP
SARS-COV-2 IGM SERPL IA-ACNC: 6.6 AU/ML — SIGNIFICANT CHANGE UP
SARS-COV-2 RNA SPEC QL NAA+PROBE: DETECTED
SARS-COV-2 RNA SPEC QL NAA+PROBE: DETECTED
SARS-COV-2 RNA SPEC QL NAA+PROBE: SIGNIFICANT CHANGE UP
SARS-COV-2 RNA SPEC QL NAA+PROBE: SIGNIFICANT CHANGE UP
SODIUM SERPL-SCNC: 133 MMOL/L
SODIUM SERPL-SCNC: 133 MMOL/L — LOW (ref 135–145)
SODIUM SERPL-SCNC: 133 MMOL/L — LOW (ref 135–145)
SODIUM SERPL-SCNC: 134 MMOL/L — LOW (ref 135–145)
SODIUM SERPL-SCNC: 135 MMOL/L — SIGNIFICANT CHANGE UP (ref 135–145)
SODIUM SERPL-SCNC: 135 MMOL/L — SIGNIFICANT CHANGE UP (ref 135–145)
SODIUM SERPL-SCNC: 136 MMOL/L — SIGNIFICANT CHANGE UP (ref 135–145)
SODIUM SERPL-SCNC: 137 MMOL/L — SIGNIFICANT CHANGE UP (ref 135–145)
SODIUM SERPL-SCNC: 137 MMOL/L — SIGNIFICANT CHANGE UP (ref 135–145)
SODIUM SERPL-SCNC: 140 MMOL/L — SIGNIFICANT CHANGE UP (ref 135–145)
SODIUM SERPL-SCNC: 141 MMOL/L — SIGNIFICANT CHANGE UP (ref 135–145)
SODIUM SERPL-SCNC: 141 MMOL/L — SIGNIFICANT CHANGE UP (ref 135–145)
SODIUM SERPL-SCNC: 142 MMOL/L — SIGNIFICANT CHANGE UP (ref 135–145)
SODIUM SERPL-SCNC: 144 MMOL/L — SIGNIFICANT CHANGE UP (ref 135–145)
SODIUM SERPL-SCNC: 147 MMOL/L — HIGH (ref 135–145)
SP GR SPEC: 1.01 — SIGNIFICANT CHANGE UP (ref 1.01–1.02)
SP GR SPEC: 1.02 — SIGNIFICANT CHANGE UP (ref 1.01–1.02)
SPECIMEN SOURCE: SIGNIFICANT CHANGE UP
TROPONIN T, HIGH SENSITIVITY RESULT: 60 NG/L — HIGH (ref 0–51)
TROPONIN T, HIGH SENSITIVITY RESULT: 93 NG/L — HIGH (ref 0–51)
UROBILINOGEN FLD QL: NEGATIVE — SIGNIFICANT CHANGE UP
VANCOMYCIN TROUGH SERPL-MCNC: 10.5 UG/ML — SIGNIFICANT CHANGE UP (ref 10–20)
VANCOMYCIN TROUGH SERPL-MCNC: 18.2 UG/ML — SIGNIFICANT CHANGE UP (ref 10–20)
VANCOMYCIN TROUGH SERPL-MCNC: 24.9 UG/ML — HIGH (ref 10–20)
VANCOMYCIN TROUGH SERPL-MCNC: 27.6 UG/ML — CRITICAL HIGH (ref 10–20)
WBC # BLD: 10.56 K/UL — HIGH (ref 3.8–10.5)
WBC # BLD: 11.61 K/UL — HIGH (ref 3.8–10.5)
WBC # BLD: 11.97 K/UL — HIGH (ref 3.8–10.5)
WBC # BLD: 13.61 K/UL — HIGH (ref 3.8–10.5)
WBC # BLD: 13.75 K/UL — HIGH (ref 3.8–10.5)
WBC # BLD: 15.35 K/UL — HIGH (ref 3.8–10.5)
WBC # BLD: 17.33 K/UL — HIGH (ref 3.8–10.5)
WBC # BLD: 19.14 K/UL — HIGH (ref 3.8–10.5)
WBC # BLD: 29.17 K/UL — HIGH (ref 3.8–10.5)
WBC # BLD: 34.32 K/UL — HIGH (ref 3.8–10.5)
WBC # BLD: 35.26 K/UL — HIGH (ref 3.8–10.5)
WBC # BLD: 7.2 K/UL — SIGNIFICANT CHANGE UP (ref 3.8–10.5)
WBC # BLD: 8.29 K/UL — SIGNIFICANT CHANGE UP (ref 3.8–10.5)
WBC # BLD: 8.57 K/UL — SIGNIFICANT CHANGE UP (ref 3.8–10.5)
WBC # BLD: 8.65 K/UL — SIGNIFICANT CHANGE UP (ref 3.8–10.5)
WBC # BLD: 8.82 K/UL — SIGNIFICANT CHANGE UP (ref 3.8–10.5)
WBC # BLD: 9.06 K/UL — SIGNIFICANT CHANGE UP (ref 3.8–10.5)
WBC # BLD: 9.57 K/UL — SIGNIFICANT CHANGE UP (ref 3.8–10.5)
WBC # FLD AUTO: 10.56 K/UL — HIGH (ref 3.8–10.5)
WBC # FLD AUTO: 11.61 K/UL — HIGH (ref 3.8–10.5)
WBC # FLD AUTO: 11.97 K/UL — HIGH (ref 3.8–10.5)
WBC # FLD AUTO: 13.61 K/UL — HIGH (ref 3.8–10.5)
WBC # FLD AUTO: 13.75 K/UL — HIGH (ref 3.8–10.5)
WBC # FLD AUTO: 15.35 K/UL — HIGH (ref 3.8–10.5)
WBC # FLD AUTO: 17.33 K/UL — HIGH (ref 3.8–10.5)
WBC # FLD AUTO: 19.14 K/UL — HIGH (ref 3.8–10.5)
WBC # FLD AUTO: 29.17 K/UL — HIGH (ref 3.8–10.5)
WBC # FLD AUTO: 34.32 K/UL — HIGH (ref 3.8–10.5)
WBC # FLD AUTO: 35.26 K/UL — HIGH (ref 3.8–10.5)
WBC # FLD AUTO: 7.2 K/UL — SIGNIFICANT CHANGE UP (ref 3.8–10.5)
WBC # FLD AUTO: 7.68 K/UL
WBC # FLD AUTO: 8.29 K/UL — SIGNIFICANT CHANGE UP (ref 3.8–10.5)
WBC # FLD AUTO: 8.57 K/UL — SIGNIFICANT CHANGE UP (ref 3.8–10.5)
WBC # FLD AUTO: 8.65 K/UL — SIGNIFICANT CHANGE UP (ref 3.8–10.5)
WBC # FLD AUTO: 8.82 K/UL — SIGNIFICANT CHANGE UP (ref 3.8–10.5)
WBC # FLD AUTO: 9.06 K/UL — SIGNIFICANT CHANGE UP (ref 3.8–10.5)
WBC # FLD AUTO: 9.57 K/UL — SIGNIFICANT CHANGE UP (ref 3.8–10.5)
WBC UR QL: 119 /HPF — HIGH (ref 0–5)
WBC UR QL: 12 /HPF — SIGNIFICANT CHANGE UP (ref 0–5)
WBC UR QL: 129 /HPF — HIGH (ref 0–5)
WBC UR QL: 26 /HPF — HIGH (ref 0–5)

## 2021-01-01 PROCEDURE — 93306 TTE W/DOPPLER COMPLETE: CPT | Mod: 26

## 2021-01-01 PROCEDURE — 99214 OFFICE O/P EST MOD 30 MIN: CPT

## 2021-01-01 PROCEDURE — 99221 1ST HOSP IP/OBS SF/LOW 40: CPT

## 2021-01-01 PROCEDURE — 83036 HEMOGLOBIN GLYCOSYLATED A1C: CPT

## 2021-01-01 PROCEDURE — 81001 URINALYSIS AUTO W/SCOPE: CPT

## 2021-01-01 PROCEDURE — 99223 1ST HOSP IP/OBS HIGH 75: CPT

## 2021-01-01 PROCEDURE — 99214 OFFICE O/P EST MOD 30 MIN: CPT | Mod: 95

## 2021-01-01 PROCEDURE — 86769 SARS-COV-2 COVID-19 ANTIBODY: CPT

## 2021-01-01 PROCEDURE — 99285 EMERGENCY DEPT VISIT HI MDM: CPT | Mod: 25

## 2021-01-01 PROCEDURE — 71045 X-RAY EXAM CHEST 1 VIEW: CPT | Mod: 26

## 2021-01-01 PROCEDURE — 80053 COMPREHEN METABOLIC PANEL: CPT

## 2021-01-01 PROCEDURE — 96374 THER/PROPH/DIAG INJ IV PUSH: CPT | Mod: XU

## 2021-01-01 PROCEDURE — 99285 EMERGENCY DEPT VISIT HI MDM: CPT | Mod: GC

## 2021-01-01 PROCEDURE — 51702 INSERT TEMP BLADDER CATH: CPT

## 2021-01-01 PROCEDURE — 82962 GLUCOSE BLOOD TEST: CPT

## 2021-01-01 PROCEDURE — 70450 CT HEAD/BRAIN W/O DYE: CPT | Mod: 26,MA

## 2021-01-01 PROCEDURE — 85025 COMPLETE CBC W/AUTO DIFF WBC: CPT

## 2021-01-01 PROCEDURE — U0005: CPT

## 2021-01-01 PROCEDURE — 85027 COMPLETE CBC AUTOMATED: CPT

## 2021-01-01 PROCEDURE — 93010 ELECTROCARDIOGRAM REPORT: CPT | Mod: GC

## 2021-01-01 PROCEDURE — 87086 URINE CULTURE/COLONY COUNT: CPT

## 2021-01-01 PROCEDURE — 87521 HEPATITIS C PROBE&RVRS TRNSC: CPT

## 2021-01-01 PROCEDURE — 93005 ELECTROCARDIOGRAM TRACING: CPT

## 2021-01-01 PROCEDURE — 80074 ACUTE HEPATITIS PANEL: CPT

## 2021-01-01 PROCEDURE — 71275 CT ANGIOGRAPHY CHEST: CPT | Mod: 26,MA

## 2021-01-01 PROCEDURE — 93308 TTE F-UP OR LMTD: CPT | Mod: 26

## 2021-01-01 PROCEDURE — 97116 GAIT TRAINING THERAPY: CPT

## 2021-01-01 PROCEDURE — 36415 COLL VENOUS BLD VENIPUNCTURE: CPT

## 2021-01-01 PROCEDURE — 99443: CPT | Mod: 95

## 2021-01-01 PROCEDURE — 99284 EMERGENCY DEPT VISIT MOD MDM: CPT

## 2021-01-01 PROCEDURE — 76705 ECHO EXAM OF ABDOMEN: CPT | Mod: 26,RT

## 2021-01-01 PROCEDURE — 99284 EMERGENCY DEPT VISIT MOD MDM: CPT | Mod: 25

## 2021-01-01 PROCEDURE — 76700 US EXAM ABDOM COMPLETE: CPT | Mod: 26

## 2021-01-01 PROCEDURE — 80048 BASIC METABOLIC PNL TOTAL CA: CPT

## 2021-01-01 PROCEDURE — U0003: CPT

## 2021-01-01 PROCEDURE — 97161 PT EVAL LOW COMPLEX 20 MIN: CPT

## 2021-01-01 PROCEDURE — 93010 ELECTROCARDIOGRAM REPORT: CPT

## 2021-01-01 PROCEDURE — 99285 EMERGENCY DEPT VISIT HI MDM: CPT | Mod: CS,25,GC

## 2021-01-01 PROCEDURE — 87186 SC STD MICRODIL/AGAR DIL: CPT

## 2021-01-01 PROCEDURE — 54450 PREPUTIAL STRETCHING: CPT

## 2021-01-01 PROCEDURE — 51703 INSERT BLADDER CATH COMPLEX: CPT

## 2021-01-01 PROCEDURE — 76700 US EXAM ABDOM COMPLETE: CPT

## 2021-01-01 PROCEDURE — 97530 THERAPEUTIC ACTIVITIES: CPT

## 2021-01-01 PROCEDURE — 99222 1ST HOSP IP/OBS MODERATE 55: CPT

## 2021-01-01 PROCEDURE — 87040 BLOOD CULTURE FOR BACTERIA: CPT

## 2021-01-01 RX ORDER — DEXTROSE 50 % IN WATER 50 %
15 SYRINGE (ML) INTRAVENOUS ONCE
Refills: 0 | Status: DISCONTINUED | OUTPATIENT
Start: 2021-01-01 | End: 2022-01-01

## 2021-01-01 RX ORDER — ACETAMINOPHEN 500 MG
1000 TABLET ORAL ONCE
Refills: 0 | Status: COMPLETED | OUTPATIENT
Start: 2021-01-01 | End: 2021-01-01

## 2021-01-01 RX ORDER — FUROSEMIDE 40 MG
20 TABLET ORAL DAILY
Refills: 0 | Status: DISCONTINUED | OUTPATIENT
Start: 2021-01-01 | End: 2021-01-01

## 2021-01-01 RX ORDER — VANCOMYCIN HCL 1 G
1000 VIAL (EA) INTRAVENOUS EVERY 24 HOURS
Refills: 0 | Status: DISCONTINUED | OUTPATIENT
Start: 2021-01-01 | End: 2022-01-01

## 2021-01-01 RX ORDER — GLUCAGON INJECTION, SOLUTION 0.5 MG/.1ML
1 INJECTION, SOLUTION SUBCUTANEOUS ONCE
Refills: 0 | Status: DISCONTINUED | OUTPATIENT
Start: 2021-01-01 | End: 2022-01-01

## 2021-01-01 RX ORDER — AMIODARONE HYDROCHLORIDE 400 MG/1
200 TABLET ORAL DAILY
Refills: 0 | Status: DISCONTINUED | OUTPATIENT
Start: 2021-01-01 | End: 2022-01-01

## 2021-01-01 RX ORDER — CHOLECALCIFEROL (VITAMIN D3) 125 MCG
2000 CAPSULE ORAL DAILY
Refills: 0 | Status: DISCONTINUED | OUTPATIENT
Start: 2021-01-01 | End: 2021-01-01

## 2021-01-01 RX ORDER — APIXABAN 2.5 MG/1
2.5 TABLET, FILM COATED ORAL
Refills: 0 | Status: DISCONTINUED | OUTPATIENT
Start: 2021-01-01 | End: 2021-01-01

## 2021-01-01 RX ORDER — CEFEPIME 1 G/1
2000 INJECTION, POWDER, FOR SOLUTION INTRAMUSCULAR; INTRAVENOUS EVERY 24 HOURS
Refills: 0 | Status: DISCONTINUED | OUTPATIENT
Start: 2021-01-01 | End: 2021-01-01

## 2021-01-01 RX ORDER — TAMSULOSIN HYDROCHLORIDE 0.4 MG/1
0.4 CAPSULE ORAL AT BEDTIME
Refills: 0 | Status: DISCONTINUED | OUTPATIENT
Start: 2021-01-01 | End: 2021-01-01

## 2021-01-01 RX ORDER — ASPIRIN/CALCIUM CARB/MAGNESIUM 324 MG
81 TABLET ORAL DAILY
Refills: 0 | Status: DISCONTINUED | OUTPATIENT
Start: 2021-01-01 | End: 2022-01-01

## 2021-01-01 RX ORDER — APIXABAN 2.5 MG/1
5 TABLET, FILM COATED ORAL
Refills: 0 | Status: DISCONTINUED | OUTPATIENT
Start: 2021-01-01 | End: 2021-01-01

## 2021-01-01 RX ORDER — FINASTERIDE 5 MG/1
1 TABLET, FILM COATED ORAL
Qty: 0 | Refills: 0 | DISCHARGE

## 2021-01-01 RX ORDER — CEFPODOXIME PROXETIL 100 MG
100 TABLET ORAL ONCE
Refills: 0 | Status: DISCONTINUED | OUTPATIENT
Start: 2021-01-01 | End: 2021-01-01

## 2021-01-01 RX ORDER — SIMVASTATIN 20 MG/1
20 TABLET, FILM COATED ORAL AT BEDTIME
Refills: 0 | Status: DISCONTINUED | OUTPATIENT
Start: 2021-01-01 | End: 2022-01-01

## 2021-01-01 RX ORDER — APIXABAN 2.5 MG/1
1 TABLET, FILM COATED ORAL
Qty: 0 | Refills: 0 | DISCHARGE
Start: 2021-01-01

## 2021-01-01 RX ORDER — CEFTRIAXONE 500 MG/1
1000 INJECTION, POWDER, FOR SOLUTION INTRAMUSCULAR; INTRAVENOUS ONCE
Refills: 0 | Status: COMPLETED | OUTPATIENT
Start: 2021-01-01 | End: 2021-01-01

## 2021-01-01 RX ORDER — MEROPENEM 1 G/30ML
1000 INJECTION INTRAVENOUS ONCE
Refills: 0 | Status: DISCONTINUED | OUTPATIENT
Start: 2021-01-01 | End: 2021-01-01

## 2021-01-01 RX ORDER — POTASSIUM CHLORIDE 750 MG/1
10 TABLET, EXTENDED RELEASE ORAL
Qty: 90 | Refills: 3 | Status: ACTIVE | COMMUNITY
Start: 2020-06-15 | End: 1900-01-01

## 2021-01-01 RX ORDER — PIPERACILLIN AND TAZOBACTAM 4; .5 G/20ML; G/20ML
3.38 INJECTION, POWDER, LYOPHILIZED, FOR SOLUTION INTRAVENOUS ONCE
Refills: 0 | Status: DISCONTINUED | OUTPATIENT
Start: 2021-01-01 | End: 2021-01-01

## 2021-01-01 RX ORDER — LATANOPROST 0.05 MG/ML
1 SOLUTION/ DROPS OPHTHALMIC; TOPICAL AT BEDTIME
Refills: 0 | Status: DISCONTINUED | OUTPATIENT
Start: 2021-01-01 | End: 2021-01-01

## 2021-01-01 RX ORDER — FINASTERIDE 5 MG/1
5 TABLET, FILM COATED ORAL DAILY
Refills: 0 | Status: DISCONTINUED | OUTPATIENT
Start: 2021-01-01 | End: 2022-01-01

## 2021-01-01 RX ORDER — CHOLECALCIFEROL (VITAMIN D3) 125 MCG
1 CAPSULE ORAL
Qty: 0 | Refills: 0 | DISCHARGE

## 2021-01-01 RX ORDER — ESCITALOPRAM OXALATE 10 MG/1
1.5 TABLET, FILM COATED ORAL
Qty: 45 | Refills: 0
Start: 2021-01-01 | End: 2021-01-01

## 2021-01-01 RX ORDER — THIAMINE MONONITRATE (VIT B1) 100 MG
100 TABLET ORAL DAILY
Refills: 0 | Status: DISCONTINUED | OUTPATIENT
Start: 2021-01-01 | End: 2022-01-01

## 2021-01-01 RX ORDER — GUAIFENESIN/DEXTROMETHORPHAN 600MG-30MG
10 TABLET, EXTENDED RELEASE 12 HR ORAL EVERY 4 HOURS
Refills: 0 | Status: DISCONTINUED | OUTPATIENT
Start: 2021-01-01 | End: 2022-01-01

## 2021-01-01 RX ORDER — DEXAMETHASONE 0.5 MG/5ML
6 ELIXIR ORAL DAILY
Refills: 0 | Status: COMPLETED | OUTPATIENT
Start: 2021-01-01 | End: 2022-01-01

## 2021-01-01 RX ORDER — ASCORBIC ACID 60 MG
500 TABLET,CHEWABLE ORAL DAILY
Refills: 0 | Status: DISCONTINUED | OUTPATIENT
Start: 2021-01-01 | End: 2022-01-01

## 2021-01-01 RX ORDER — SODIUM CHLORIDE 9 MG/ML
1000 INJECTION, SOLUTION INTRAVENOUS
Refills: 0 | Status: DISCONTINUED | OUTPATIENT
Start: 2021-01-01 | End: 2021-01-01

## 2021-01-01 RX ORDER — DEXTROSE 50 % IN WATER 50 %
25 SYRINGE (ML) INTRAVENOUS ONCE
Refills: 0 | Status: DISCONTINUED | OUTPATIENT
Start: 2021-01-01 | End: 2021-01-01

## 2021-01-01 RX ORDER — INSULIN GLARGINE 100 [IU]/ML
8 INJECTION, SOLUTION SUBCUTANEOUS AT BEDTIME
Refills: 0 | Status: DISCONTINUED | OUTPATIENT
Start: 2021-01-01 | End: 2022-01-01

## 2021-01-01 RX ORDER — PIPERACILLIN AND TAZOBACTAM 4; .5 G/20ML; G/20ML
3.38 INJECTION, POWDER, LYOPHILIZED, FOR SOLUTION INTRAVENOUS EVERY 12 HOURS
Refills: 0 | Status: DISCONTINUED | OUTPATIENT
Start: 2021-01-01 | End: 2021-01-01

## 2021-01-01 RX ORDER — APIXABAN 2.5 MG/1
2.5 TABLET, FILM COATED ORAL
Qty: 60 | Refills: 5 | Status: ACTIVE | COMMUNITY
Start: 2018-06-19 | End: 1900-01-01

## 2021-01-01 RX ORDER — REMDESIVIR 5 MG/ML
200 INJECTION INTRAVENOUS EVERY 24 HOURS
Refills: 0 | Status: COMPLETED | OUTPATIENT
Start: 2021-01-01 | End: 2021-01-01

## 2021-01-01 RX ORDER — CYCLOSPORINE 0.5 MG/ML
1 EMULSION OPHTHALMIC
Qty: 0 | Refills: 0 | DISCHARGE

## 2021-01-01 RX ORDER — MUPIROCIN 20 MG/G
1 OINTMENT TOPICAL
Refills: 0 | Status: DISCONTINUED | OUTPATIENT
Start: 2021-01-01 | End: 2022-01-01

## 2021-01-01 RX ORDER — METHENAMINE HIPPURATE 1 G/1
1 TABLET ORAL TWICE DAILY
Qty: 180 | Refills: 3 | Status: ACTIVE | COMMUNITY
Start: 2021-01-01 | End: 1900-01-01

## 2021-01-01 RX ORDER — ASPIRIN/CALCIUM CARB/MAGNESIUM 324 MG
81 TABLET ORAL DAILY
Refills: 0 | Status: DISCONTINUED | OUTPATIENT
Start: 2021-01-01 | End: 2021-01-01

## 2021-01-01 RX ORDER — LATANOPROST 0.05 MG/ML
1 SOLUTION/ DROPS OPHTHALMIC; TOPICAL
Qty: 0 | Refills: 0 | DISCHARGE

## 2021-01-01 RX ORDER — SODIUM BICARBONATE 1 MEQ/ML
0.06 SYRINGE (ML) INTRAVENOUS
Qty: 75 | Refills: 0 | Status: DISCONTINUED | OUTPATIENT
Start: 2021-01-01 | End: 2022-01-01

## 2021-01-01 RX ORDER — CEPHALEXIN 250 MG/1
250 CAPSULE ORAL 4 TIMES DAILY
Qty: 28 | Refills: 0 | Status: COMPLETED | COMMUNITY
Start: 2020-12-23 | End: 2021-01-01

## 2021-01-01 RX ORDER — DEXTROSE 50 % IN WATER 50 %
12.5 SYRINGE (ML) INTRAVENOUS ONCE
Refills: 0 | Status: DISCONTINUED | OUTPATIENT
Start: 2021-01-01 | End: 2021-01-01

## 2021-01-01 RX ORDER — HEPARIN SODIUM 5000 [USP'U]/ML
INJECTION INTRAVENOUS; SUBCUTANEOUS
Qty: 25000 | Refills: 0 | Status: DISCONTINUED | OUTPATIENT
Start: 2021-01-01 | End: 2022-01-01

## 2021-01-01 RX ORDER — REMDESIVIR 5 MG/ML
100 INJECTION INTRAVENOUS EVERY 24 HOURS
Refills: 0 | Status: DISCONTINUED | OUTPATIENT
Start: 2021-01-01 | End: 2021-01-01

## 2021-01-01 RX ORDER — CEFPODOXIME PROXETIL 100 MG
1 TABLET ORAL
Qty: 14 | Refills: 0
Start: 2021-01-01 | End: 2021-01-01

## 2021-01-01 RX ORDER — ASPIRIN/CALCIUM CARB/MAGNESIUM 324 MG
1 TABLET ORAL
Qty: 0 | Refills: 0 | DISCHARGE

## 2021-01-01 RX ORDER — SIMVASTATIN 20 MG/1
20 TABLET, FILM COATED ORAL
Qty: 90 | Refills: 1 | Status: ACTIVE | COMMUNITY
Start: 2019-11-26 | End: 1900-01-01

## 2021-01-01 RX ORDER — TAMSULOSIN HYDROCHLORIDE 0.4 MG/1
1 CAPSULE ORAL
Qty: 0 | Refills: 0 | DISCHARGE

## 2021-01-01 RX ORDER — LATANOPROST 0.05 MG/ML
1 SOLUTION/ DROPS OPHTHALMIC; TOPICAL AT BEDTIME
Refills: 0 | Status: DISCONTINUED | OUTPATIENT
Start: 2021-01-01 | End: 2022-01-01

## 2021-01-01 RX ORDER — VANCOMYCIN HCL 1 G
1000 VIAL (EA) INTRAVENOUS EVERY 24 HOURS
Refills: 0 | Status: DISCONTINUED | OUTPATIENT
Start: 2021-01-01 | End: 2021-01-01

## 2021-01-01 RX ORDER — BENZONATATE 100 MG/1
100 CAPSULE ORAL 3 TIMES DAILY
Qty: 180 | Refills: 3 | Status: ACTIVE | COMMUNITY
Start: 2021-01-01 | End: 1900-01-01

## 2021-01-01 RX ORDER — DEXTROSE 50 % IN WATER 50 %
25 SYRINGE (ML) INTRAVENOUS ONCE
Refills: 0 | Status: DISCONTINUED | OUTPATIENT
Start: 2021-01-01 | End: 2022-01-01

## 2021-01-01 RX ORDER — APIXABAN 2.5 MG/1
1 TABLET, FILM COATED ORAL
Qty: 0 | Refills: 0 | DISCHARGE

## 2021-01-01 RX ORDER — CEFUROXIME AXETIL 250 MG/1
250 TABLET ORAL
Qty: 10 | Refills: 0 | Status: COMPLETED | COMMUNITY
Start: 2020-11-06 | End: 2021-01-01

## 2021-01-01 RX ORDER — GLUCAGON INJECTION, SOLUTION 0.5 MG/.1ML
1 INJECTION, SOLUTION SUBCUTANEOUS ONCE
Refills: 0 | Status: DISCONTINUED | OUTPATIENT
Start: 2021-01-01 | End: 2021-01-01

## 2021-01-01 RX ORDER — POTASSIUM CHLORIDE 20 MEQ
1 PACKET (EA) ORAL
Qty: 0 | Refills: 0 | DISCHARGE

## 2021-01-01 RX ORDER — MEROPENEM 1 G/30ML
1000 INJECTION INTRAVENOUS ONCE
Refills: 0 | Status: COMPLETED | OUTPATIENT
Start: 2021-01-01 | End: 2021-01-01

## 2021-01-01 RX ORDER — SIMVASTATIN 20 MG/1
20 TABLET, FILM COATED ORAL AT BEDTIME
Refills: 0 | Status: DISCONTINUED | OUTPATIENT
Start: 2021-01-01 | End: 2021-01-01

## 2021-01-01 RX ORDER — FINASTERIDE 5 MG/1
5 TABLET, FILM COATED ORAL DAILY
Refills: 0 | Status: DISCONTINUED | OUTPATIENT
Start: 2021-01-01 | End: 2021-01-01

## 2021-01-01 RX ORDER — ALBUTEROL 90 UG/1
2 AEROSOL, METERED ORAL EVERY 6 HOURS
Refills: 0 | Status: DISCONTINUED | OUTPATIENT
Start: 2021-01-01 | End: 2022-01-01

## 2021-01-01 RX ORDER — SODIUM CHLORIDE 9 MG/ML
1000 INJECTION, SOLUTION INTRAVENOUS
Refills: 0 | Status: DISCONTINUED | OUTPATIENT
Start: 2021-01-01 | End: 2022-01-01

## 2021-01-01 RX ORDER — TAMSULOSIN HYDROCHLORIDE 0.4 MG/1
0.4 CAPSULE ORAL
Qty: 90 | Refills: 2 | Status: DISCONTINUED | COMMUNITY
Start: 2019-11-27 | End: 2021-01-01

## 2021-01-01 RX ORDER — SODIUM CHLORIDE 9 MG/ML
500 INJECTION INTRAMUSCULAR; INTRAVENOUS; SUBCUTANEOUS ONCE
Refills: 0 | Status: COMPLETED | OUTPATIENT
Start: 2021-01-01 | End: 2021-01-01

## 2021-01-01 RX ORDER — DEXAMETHASONE 0.5 MG/5ML
6 ELIXIR ORAL ONCE
Refills: 0 | Status: COMPLETED | OUTPATIENT
Start: 2021-01-01 | End: 2021-01-01

## 2021-01-01 RX ORDER — VANCOMYCIN HCL 1 G
1000 VIAL (EA) INTRAVENOUS ONCE
Refills: 0 | Status: COMPLETED | OUTPATIENT
Start: 2021-01-01 | End: 2021-01-01

## 2021-01-01 RX ORDER — APIXABAN 2.5 MG/1
2.5 TABLET, FILM COATED ORAL EVERY 12 HOURS
Refills: 0 | Status: DISCONTINUED | OUTPATIENT
Start: 2021-01-01 | End: 2021-01-01

## 2021-01-01 RX ORDER — INSULIN LISPRO 100/ML
VIAL (ML) SUBCUTANEOUS AT BEDTIME
Refills: 0 | Status: DISCONTINUED | OUTPATIENT
Start: 2021-01-01 | End: 2021-01-01

## 2021-01-01 RX ORDER — INSULIN LISPRO 100/ML
VIAL (ML) SUBCUTANEOUS
Refills: 0 | Status: DISCONTINUED | OUTPATIENT
Start: 2021-01-01 | End: 2021-01-01

## 2021-01-01 RX ORDER — DEXTROSE 50 % IN WATER 50 %
15 SYRINGE (ML) INTRAVENOUS ONCE
Refills: 0 | Status: DISCONTINUED | OUTPATIENT
Start: 2021-01-01 | End: 2021-01-01

## 2021-01-01 RX ORDER — SULFAMETHOXAZOLE AND TRIMETHOPRIM 800; 160 MG/1; MG/1
800-160 TABLET ORAL TWICE DAILY
Qty: 14 | Refills: 0 | Status: DISCONTINUED | COMMUNITY
Start: 2020-05-20 | End: 2021-01-01

## 2021-01-01 RX ORDER — CHOLECALCIFEROL (VITAMIN D3) 125 MCG
2000 CAPSULE ORAL DAILY
Refills: 0 | Status: DISCONTINUED | OUTPATIENT
Start: 2021-01-01 | End: 2022-01-01

## 2021-01-01 RX ORDER — LINAGLIPTIN 5 MG/1
1 TABLET, FILM COATED ORAL
Qty: 0 | Refills: 0 | DISCHARGE

## 2021-01-01 RX ORDER — DEXTROSE MONOHYDRATE, SODIUM CHLORIDE, AND POTASSIUM CHLORIDE 50; .745; 4.5 G/1000ML; G/1000ML; G/1000ML
1000 INJECTION, SOLUTION INTRAVENOUS
Refills: 0 | Status: DISCONTINUED | OUTPATIENT
Start: 2021-01-01 | End: 2021-01-01

## 2021-01-01 RX ORDER — FINASTERIDE 5 MG/1
5 TABLET, FILM COATED ORAL DAILY
Qty: 90 | Refills: 2 | Status: ACTIVE | COMMUNITY
Start: 2019-11-27 | End: 1900-01-01

## 2021-01-01 RX ORDER — ACETAMINOPHEN 500 MG
650 TABLET ORAL EVERY 4 HOURS
Refills: 0 | Status: DISCONTINUED | OUTPATIENT
Start: 2021-01-01 | End: 2022-01-01

## 2021-01-01 RX ORDER — REMDESIVIR 5 MG/ML
INJECTION INTRAVENOUS
Refills: 0 | Status: DISCONTINUED | OUTPATIENT
Start: 2021-01-01 | End: 2021-01-01

## 2021-01-01 RX ORDER — INSULIN LISPRO 100/ML
VIAL (ML) SUBCUTANEOUS AT BEDTIME
Refills: 0 | Status: DISCONTINUED | OUTPATIENT
Start: 2021-01-01 | End: 2022-01-01

## 2021-01-01 RX ORDER — INSULIN LISPRO 100/ML
VIAL (ML) SUBCUTANEOUS
Refills: 0 | Status: DISCONTINUED | OUTPATIENT
Start: 2021-01-01 | End: 2022-01-01

## 2021-01-01 RX ORDER — APIXABAN 2.5 MG/1
1 TABLET, FILM COATED ORAL
Qty: 60 | Refills: 0
Start: 2021-01-01 | End: 2021-01-01

## 2021-01-01 RX ORDER — AMIODARONE HYDROCHLORIDE 400 MG/1
200 TABLET ORAL DAILY
Refills: 0 | Status: DISCONTINUED | OUTPATIENT
Start: 2021-01-01 | End: 2021-01-01

## 2021-01-01 RX ORDER — PIPERACILLIN AND TAZOBACTAM 4; .5 G/20ML; G/20ML
3.38 INJECTION, POWDER, LYOPHILIZED, FOR SOLUTION INTRAVENOUS ONCE
Refills: 0 | Status: COMPLETED | OUTPATIENT
Start: 2021-01-01 | End: 2021-01-01

## 2021-01-01 RX ORDER — ESCITALOPRAM OXALATE 10 MG/1
15 TABLET, FILM COATED ORAL DAILY
Refills: 0 | Status: DISCONTINUED | OUTPATIENT
Start: 2021-01-01 | End: 2021-01-01

## 2021-01-01 RX ORDER — TOCILIZUMAB 20 MG/ML
800 INJECTION, SOLUTION, CONCENTRATE INTRAVENOUS ONCE
Refills: 0 | Status: COMPLETED | OUTPATIENT
Start: 2021-01-01 | End: 2021-01-01

## 2021-01-01 RX ORDER — DEXTROSE 50 % IN WATER 50 %
12.5 SYRINGE (ML) INTRAVENOUS ONCE
Refills: 0 | Status: DISCONTINUED | OUTPATIENT
Start: 2021-01-01 | End: 2022-01-01

## 2021-01-01 RX ORDER — INSULIN GLARGINE 100 [IU]/ML
4 INJECTION, SOLUTION SUBCUTANEOUS AT BEDTIME
Refills: 0 | Status: DISCONTINUED | OUTPATIENT
Start: 2021-01-01 | End: 2021-01-01

## 2021-01-01 RX ORDER — HEPARIN SODIUM 5000 [USP'U]/ML
8000 INJECTION INTRAVENOUS; SUBCUTANEOUS EVERY 6 HOURS
Refills: 0 | Status: DISCONTINUED | OUTPATIENT
Start: 2021-01-01 | End: 2022-01-01

## 2021-01-01 RX ORDER — ESCITALOPRAM OXALATE 10 MG/1
15 TABLET, FILM COATED ORAL DAILY
Refills: 0 | Status: DISCONTINUED | OUTPATIENT
Start: 2021-01-01 | End: 2022-01-01

## 2021-01-01 RX ORDER — AMOXICILLIN AND CLAVULANATE POTASSIUM 875; 125 MG/1; MG/1
875-125 TABLET, COATED ORAL
Qty: 14 | Refills: 0 | Status: COMPLETED | COMMUNITY
Start: 2021-01-01 | End: 2021-01-01

## 2021-01-01 RX ORDER — ESCITALOPRAM OXALATE 10 MG/1
1.5 TABLET, FILM COATED ORAL
Qty: 0 | Refills: 0 | DISCHARGE

## 2021-01-01 RX ORDER — HEPARIN SODIUM 5000 [USP'U]/ML
4000 INJECTION INTRAVENOUS; SUBCUTANEOUS EVERY 6 HOURS
Refills: 0 | Status: DISCONTINUED | OUTPATIENT
Start: 2021-01-01 | End: 2022-01-01

## 2021-01-01 RX ORDER — SIMVASTATIN 20 MG/1
1 TABLET, FILM COATED ORAL
Qty: 0 | Refills: 0 | DISCHARGE

## 2021-01-01 RX ORDER — AMIODARONE HYDROCHLORIDE 400 MG/1
1 TABLET ORAL
Qty: 0 | Refills: 0 | DISCHARGE

## 2021-01-01 RX ORDER — CEFEPIME 1 G/1
1000 INJECTION, POWDER, FOR SOLUTION INTRAMUSCULAR; INTRAVENOUS EVERY 24 HOURS
Refills: 0 | Status: DISCONTINUED | OUTPATIENT
Start: 2021-01-01 | End: 2022-01-01

## 2021-01-01 RX ORDER — CHLORHEXIDINE GLUCONATE 213 G/1000ML
1 SOLUTION TOPICAL
Refills: 0 | Status: DISCONTINUED | OUTPATIENT
Start: 2021-01-01 | End: 2022-01-01

## 2021-01-01 RX ORDER — TAMSULOSIN HYDROCHLORIDE 0.4 MG/1
0.4 CAPSULE ORAL AT BEDTIME
Refills: 0 | Status: DISCONTINUED | OUTPATIENT
Start: 2021-01-01 | End: 2022-01-01

## 2021-01-01 RX ADMIN — TAMSULOSIN HYDROCHLORIDE 0.4 MILLIGRAM(S): 0.4 CAPSULE ORAL at 22:43

## 2021-01-01 RX ADMIN — CEFEPIME 100 MILLIGRAM(S): 1 INJECTION, POWDER, FOR SOLUTION INTRAMUSCULAR; INTRAVENOUS at 11:34

## 2021-01-01 RX ADMIN — Medication 250 MILLIGRAM(S): at 22:42

## 2021-01-01 RX ADMIN — FINASTERIDE 5 MILLIGRAM(S): 5 TABLET, FILM COATED ORAL at 12:49

## 2021-01-01 RX ADMIN — ESCITALOPRAM OXALATE 15 MILLIGRAM(S): 10 TABLET, FILM COATED ORAL at 12:10

## 2021-01-01 RX ADMIN — APIXABAN 2.5 MILLIGRAM(S): 2.5 TABLET, FILM COATED ORAL at 12:01

## 2021-01-01 RX ADMIN — Medication 1: at 12:08

## 2021-01-01 RX ADMIN — ESCITALOPRAM OXALATE 15 MILLIGRAM(S): 10 TABLET, FILM COATED ORAL at 10:08

## 2021-01-01 RX ADMIN — Medication 100 MILLIGRAM(S): at 12:11

## 2021-01-01 RX ADMIN — AMIODARONE HYDROCHLORIDE 200 MILLIGRAM(S): 400 TABLET ORAL at 05:24

## 2021-01-01 RX ADMIN — APIXABAN 2.5 MILLIGRAM(S): 2.5 TABLET, FILM COATED ORAL at 08:51

## 2021-01-01 RX ADMIN — DEXTROSE MONOHYDRATE, SODIUM CHLORIDE, AND POTASSIUM CHLORIDE 50 MILLILITER(S): 50; .745; 4.5 INJECTION, SOLUTION INTRAVENOUS at 01:16

## 2021-01-01 RX ADMIN — Medication 81 MILLIGRAM(S): at 11:17

## 2021-01-01 RX ADMIN — Medication 6 MILLIGRAM(S): at 06:48

## 2021-01-01 RX ADMIN — APIXABAN 2.5 MILLIGRAM(S): 2.5 TABLET, FILM COATED ORAL at 05:27

## 2021-01-01 RX ADMIN — Medication 500 MILLIGRAM(S): at 12:12

## 2021-01-01 RX ADMIN — Medication 75 MEQ/KG/HR: at 18:08

## 2021-01-01 RX ADMIN — APIXABAN 2.5 MILLIGRAM(S): 2.5 TABLET, FILM COATED ORAL at 17:11

## 2021-01-01 RX ADMIN — Medication 2000 UNIT(S): at 11:16

## 2021-01-01 RX ADMIN — APIXABAN 2.5 MILLIGRAM(S): 2.5 TABLET, FILM COATED ORAL at 10:08

## 2021-01-01 RX ADMIN — Medication 2: at 08:17

## 2021-01-01 RX ADMIN — Medication 250 MILLIGRAM(S): at 21:44

## 2021-01-01 RX ADMIN — TAMSULOSIN HYDROCHLORIDE 0.4 MILLIGRAM(S): 0.4 CAPSULE ORAL at 22:38

## 2021-01-01 RX ADMIN — LATANOPROST 1 DROP(S): 0.05 SOLUTION/ DROPS OPHTHALMIC; TOPICAL at 23:19

## 2021-01-01 RX ADMIN — Medication 500 MILLIGRAM(S): at 21:41

## 2021-01-01 RX ADMIN — FINASTERIDE 5 MILLIGRAM(S): 5 TABLET, FILM COATED ORAL at 11:16

## 2021-01-01 RX ADMIN — Medication 2: at 13:49

## 2021-01-01 RX ADMIN — CEFEPIME 100 MILLIGRAM(S): 1 INJECTION, POWDER, FOR SOLUTION INTRAMUSCULAR; INTRAVENOUS at 17:24

## 2021-01-01 RX ADMIN — Medication 2000 UNIT(S): at 10:08

## 2021-01-01 RX ADMIN — Medication 2000 UNIT(S): at 11:19

## 2021-01-01 RX ADMIN — APIXABAN 2.5 MILLIGRAM(S): 2.5 TABLET, FILM COATED ORAL at 06:26

## 2021-01-01 RX ADMIN — Medication 81 MILLIGRAM(S): at 12:09

## 2021-01-01 RX ADMIN — APIXABAN 2.5 MILLIGRAM(S): 2.5 TABLET, FILM COATED ORAL at 08:43

## 2021-01-01 RX ADMIN — SIMVASTATIN 20 MILLIGRAM(S): 20 TABLET, FILM COATED ORAL at 21:50

## 2021-01-01 RX ADMIN — ESCITALOPRAM OXALATE 15 MILLIGRAM(S): 10 TABLET, FILM COATED ORAL at 12:02

## 2021-01-01 RX ADMIN — Medication 2: at 12:01

## 2021-01-01 RX ADMIN — APIXABAN 2.5 MILLIGRAM(S): 2.5 TABLET, FILM COATED ORAL at 09:06

## 2021-01-01 RX ADMIN — CEFEPIME 100 MILLIGRAM(S): 1 INJECTION, POWDER, FOR SOLUTION INTRAMUSCULAR; INTRAVENOUS at 11:30

## 2021-01-01 RX ADMIN — INSULIN GLARGINE 8 UNIT(S): 100 INJECTION, SOLUTION SUBCUTANEOUS at 22:29

## 2021-01-01 RX ADMIN — Medication 81 MILLIGRAM(S): at 11:19

## 2021-01-01 RX ADMIN — ALBUTEROL 2 PUFF(S): 90 AEROSOL, METERED ORAL at 05:09

## 2021-01-01 RX ADMIN — SIMVASTATIN 20 MILLIGRAM(S): 20 TABLET, FILM COATED ORAL at 22:38

## 2021-01-01 RX ADMIN — Medication 500 MILLIGRAM(S): at 11:16

## 2021-01-01 RX ADMIN — APIXABAN 2.5 MILLIGRAM(S): 2.5 TABLET, FILM COATED ORAL at 21:00

## 2021-01-01 RX ADMIN — Medication 2000 UNIT(S): at 12:01

## 2021-01-01 RX ADMIN — FINASTERIDE 5 MILLIGRAM(S): 5 TABLET, FILM COATED ORAL at 10:05

## 2021-01-01 RX ADMIN — AMIODARONE HYDROCHLORIDE 200 MILLIGRAM(S): 400 TABLET ORAL at 06:13

## 2021-01-01 RX ADMIN — ESCITALOPRAM OXALATE 15 MILLIGRAM(S): 10 TABLET, FILM COATED ORAL at 11:36

## 2021-01-01 RX ADMIN — ESCITALOPRAM OXALATE 15 MILLIGRAM(S): 10 TABLET, FILM COATED ORAL at 11:16

## 2021-01-01 RX ADMIN — AMIODARONE HYDROCHLORIDE 200 MILLIGRAM(S): 400 TABLET ORAL at 06:03

## 2021-01-01 RX ADMIN — Medication 2: at 11:49

## 2021-01-01 RX ADMIN — PIPERACILLIN AND TAZOBACTAM 25 GRAM(S): 4; .5 INJECTION, POWDER, LYOPHILIZED, FOR SOLUTION INTRAVENOUS at 06:12

## 2021-01-01 RX ADMIN — ESCITALOPRAM OXALATE 15 MILLIGRAM(S): 10 TABLET, FILM COATED ORAL at 10:04

## 2021-01-01 RX ADMIN — FINASTERIDE 5 MILLIGRAM(S): 5 TABLET, FILM COATED ORAL at 12:02

## 2021-01-01 RX ADMIN — LATANOPROST 1 DROP(S): 0.05 SOLUTION/ DROPS OPHTHALMIC; TOPICAL at 21:51

## 2021-01-01 RX ADMIN — FINASTERIDE 5 MILLIGRAM(S): 5 TABLET, FILM COATED ORAL at 12:00

## 2021-01-01 RX ADMIN — TAMSULOSIN HYDROCHLORIDE 0.4 MILLIGRAM(S): 0.4 CAPSULE ORAL at 22:12

## 2021-01-01 RX ADMIN — PIPERACILLIN AND TAZOBACTAM 25 GRAM(S): 4; .5 INJECTION, POWDER, LYOPHILIZED, FOR SOLUTION INTRAVENOUS at 17:36

## 2021-01-01 RX ADMIN — LATANOPROST 1 DROP(S): 0.05 SOLUTION/ DROPS OPHTHALMIC; TOPICAL at 21:41

## 2021-01-01 RX ADMIN — APIXABAN 2.5 MILLIGRAM(S): 2.5 TABLET, FILM COATED ORAL at 22:13

## 2021-01-01 RX ADMIN — ESCITALOPRAM OXALATE 15 MILLIGRAM(S): 10 TABLET, FILM COATED ORAL at 12:00

## 2021-01-01 RX ADMIN — LATANOPROST 1 DROP(S): 0.05 SOLUTION/ DROPS OPHTHALMIC; TOPICAL at 21:24

## 2021-01-01 RX ADMIN — Medication 2: at 16:44

## 2021-01-01 RX ADMIN — Medication 81 MILLIGRAM(S): at 12:36

## 2021-01-01 RX ADMIN — Medication 2000 UNIT(S): at 10:05

## 2021-01-01 RX ADMIN — Medication 2000 UNIT(S): at 12:48

## 2021-01-01 RX ADMIN — Medication 2: at 16:55

## 2021-01-01 RX ADMIN — REMDESIVIR 500 MILLIGRAM(S): 5 INJECTION INTRAVENOUS at 20:04

## 2021-01-01 RX ADMIN — Medication 500 MILLIGRAM(S): at 16:52

## 2021-01-01 RX ADMIN — INSULIN GLARGINE 4 UNIT(S): 100 INJECTION, SOLUTION SUBCUTANEOUS at 21:33

## 2021-01-01 RX ADMIN — Medication 2: at 12:08

## 2021-01-01 RX ADMIN — Medication 81 MILLIGRAM(S): at 10:04

## 2021-01-01 RX ADMIN — SIMVASTATIN 20 MILLIGRAM(S): 20 TABLET, FILM COATED ORAL at 22:12

## 2021-01-01 RX ADMIN — Medication 81 MILLIGRAM(S): at 11:29

## 2021-01-01 RX ADMIN — ESCITALOPRAM OXALATE 15 MILLIGRAM(S): 10 TABLET, FILM COATED ORAL at 12:49

## 2021-01-01 RX ADMIN — Medication 250 MILLIGRAM(S): at 21:50

## 2021-01-01 RX ADMIN — Medication 6 MILLIGRAM(S): at 06:26

## 2021-01-01 RX ADMIN — Medication 20 MILLIGRAM(S): at 05:27

## 2021-01-01 RX ADMIN — Medication 2000 UNIT(S): at 11:29

## 2021-01-01 RX ADMIN — AMIODARONE HYDROCHLORIDE 200 MILLIGRAM(S): 400 TABLET ORAL at 06:27

## 2021-01-01 RX ADMIN — FINASTERIDE 5 MILLIGRAM(S): 5 TABLET, FILM COATED ORAL at 11:19

## 2021-01-01 RX ADMIN — Medication 1 TABLET(S): at 21:41

## 2021-01-01 RX ADMIN — Medication 400 MILLIGRAM(S): at 16:03

## 2021-01-01 RX ADMIN — Medication 81 MILLIGRAM(S): at 12:02

## 2021-01-01 RX ADMIN — SODIUM CHLORIDE 500 MILLILITER(S): 9 INJECTION INTRAMUSCULAR; INTRAVENOUS; SUBCUTANEOUS at 17:06

## 2021-01-01 RX ADMIN — REMDESIVIR 500 MILLIGRAM(S): 5 INJECTION INTRAVENOUS at 17:36

## 2021-01-01 RX ADMIN — Medication 2000 UNIT(S): at 12:27

## 2021-01-01 RX ADMIN — SIMVASTATIN 20 MILLIGRAM(S): 20 TABLET, FILM COATED ORAL at 21:16

## 2021-01-01 RX ADMIN — ALBUTEROL 2 PUFF(S): 90 AEROSOL, METERED ORAL at 05:43

## 2021-01-01 RX ADMIN — Medication 500 MILLIGRAM(S): at 12:36

## 2021-01-01 RX ADMIN — Medication 2: at 08:37

## 2021-01-01 RX ADMIN — Medication 6 MILLIGRAM(S): at 05:27

## 2021-01-01 RX ADMIN — LATANOPROST 1 DROP(S): 0.05 SOLUTION/ DROPS OPHTHALMIC; TOPICAL at 21:44

## 2021-01-01 RX ADMIN — APIXABAN 2.5 MILLIGRAM(S): 2.5 TABLET, FILM COATED ORAL at 06:49

## 2021-01-01 RX ADMIN — TAMSULOSIN HYDROCHLORIDE 0.4 MILLIGRAM(S): 0.4 CAPSULE ORAL at 21:17

## 2021-01-01 RX ADMIN — Medication 1: at 16:39

## 2021-01-01 RX ADMIN — APIXABAN 2.5 MILLIGRAM(S): 2.5 TABLET, FILM COATED ORAL at 10:04

## 2021-01-01 RX ADMIN — ESCITALOPRAM OXALATE 15 MILLIGRAM(S): 10 TABLET, FILM COATED ORAL at 12:27

## 2021-01-01 RX ADMIN — APIXABAN 2.5 MILLIGRAM(S): 2.5 TABLET, FILM COATED ORAL at 17:36

## 2021-01-01 RX ADMIN — Medication 1 TABLET(S): at 12:11

## 2021-01-01 RX ADMIN — TAMSULOSIN HYDROCHLORIDE 0.4 MILLIGRAM(S): 0.4 CAPSULE ORAL at 21:58

## 2021-01-01 RX ADMIN — Medication 100 MILLIGRAM(S): at 21:41

## 2021-01-01 RX ADMIN — PIPERACILLIN AND TAZOBACTAM 200 GRAM(S): 4; .5 INJECTION, POWDER, LYOPHILIZED, FOR SOLUTION INTRAVENOUS at 18:15

## 2021-01-01 RX ADMIN — AMIODARONE HYDROCHLORIDE 200 MILLIGRAM(S): 400 TABLET ORAL at 05:09

## 2021-01-01 RX ADMIN — AMIODARONE HYDROCHLORIDE 200 MILLIGRAM(S): 400 TABLET ORAL at 06:26

## 2021-01-01 RX ADMIN — Medication 2: at 07:43

## 2021-01-01 RX ADMIN — APIXABAN 2.5 MILLIGRAM(S): 2.5 TABLET, FILM COATED ORAL at 05:09

## 2021-01-01 RX ADMIN — APIXABAN 2.5 MILLIGRAM(S): 2.5 TABLET, FILM COATED ORAL at 21:23

## 2021-01-01 RX ADMIN — Medication 3: at 17:23

## 2021-01-01 RX ADMIN — Medication 1 TABLET(S): at 12:26

## 2021-01-01 RX ADMIN — MEROPENEM 100 MILLIGRAM(S): 1 INJECTION INTRAVENOUS at 00:37

## 2021-01-01 RX ADMIN — LATANOPROST 1 DROP(S): 0.05 SOLUTION/ DROPS OPHTHALMIC; TOPICAL at 22:13

## 2021-01-01 RX ADMIN — Medication 1: at 12:35

## 2021-01-01 RX ADMIN — APIXABAN 2.5 MILLIGRAM(S): 2.5 TABLET, FILM COATED ORAL at 22:38

## 2021-01-01 RX ADMIN — AMIODARONE HYDROCHLORIDE 200 MILLIGRAM(S): 400 TABLET ORAL at 05:57

## 2021-01-01 RX ADMIN — LATANOPROST 1 DROP(S): 0.05 SOLUTION/ DROPS OPHTHALMIC; TOPICAL at 21:32

## 2021-01-01 RX ADMIN — PIPERACILLIN AND TAZOBACTAM 200 GRAM(S): 4; .5 INJECTION, POWDER, LYOPHILIZED, FOR SOLUTION INTRAVENOUS at 17:06

## 2021-01-01 RX ADMIN — Medication 1: at 12:36

## 2021-01-01 RX ADMIN — Medication 1000 MILLIGRAM(S): at 16:33

## 2021-01-01 RX ADMIN — LATANOPROST 1 DROP(S): 0.05 SOLUTION/ DROPS OPHTHALMIC; TOPICAL at 22:42

## 2021-01-01 RX ADMIN — SIMVASTATIN 20 MILLIGRAM(S): 20 TABLET, FILM COATED ORAL at 21:00

## 2021-01-01 RX ADMIN — Medication 6 MILLIGRAM(S): at 05:28

## 2021-01-01 RX ADMIN — CEFEPIME 100 MILLIGRAM(S): 1 INJECTION, POWDER, FOR SOLUTION INTRAMUSCULAR; INTRAVENOUS at 18:57

## 2021-01-01 RX ADMIN — APIXABAN 2.5 MILLIGRAM(S): 2.5 TABLET, FILM COATED ORAL at 22:12

## 2021-01-01 RX ADMIN — Medication 81 MILLIGRAM(S): at 12:48

## 2021-01-01 RX ADMIN — Medication 1: at 17:39

## 2021-01-01 RX ADMIN — LATANOPROST 1 DROP(S): 0.05 SOLUTION/ DROPS OPHTHALMIC; TOPICAL at 22:38

## 2021-01-01 RX ADMIN — AMIODARONE HYDROCHLORIDE 200 MILLIGRAM(S): 400 TABLET ORAL at 06:49

## 2021-01-01 RX ADMIN — ALBUTEROL 2 PUFF(S): 90 AEROSOL, METERED ORAL at 23:19

## 2021-01-01 RX ADMIN — ESCITALOPRAM OXALATE 15 MILLIGRAM(S): 10 TABLET, FILM COATED ORAL at 12:40

## 2021-01-01 RX ADMIN — LATANOPROST 1 DROP(S): 0.05 SOLUTION/ DROPS OPHTHALMIC; TOPICAL at 21:17

## 2021-01-01 RX ADMIN — Medication 81 MILLIGRAM(S): at 12:26

## 2021-01-01 RX ADMIN — TAMSULOSIN HYDROCHLORIDE 0.4 MILLIGRAM(S): 0.4 CAPSULE ORAL at 21:42

## 2021-01-01 RX ADMIN — Medication 81 MILLIGRAM(S): at 10:08

## 2021-01-01 RX ADMIN — FINASTERIDE 5 MILLIGRAM(S): 5 TABLET, FILM COATED ORAL at 11:30

## 2021-01-01 RX ADMIN — CEFEPIME 100 MILLIGRAM(S): 1 INJECTION, POWDER, FOR SOLUTION INTRAMUSCULAR; INTRAVENOUS at 12:31

## 2021-01-01 RX ADMIN — TAMSULOSIN HYDROCHLORIDE 0.4 MILLIGRAM(S): 0.4 CAPSULE ORAL at 21:50

## 2021-01-01 RX ADMIN — Medication 2000 UNIT(S): at 12:37

## 2021-01-01 RX ADMIN — Medication 3: at 17:02

## 2021-01-01 RX ADMIN — ESCITALOPRAM OXALATE 15 MILLIGRAM(S): 10 TABLET, FILM COATED ORAL at 12:11

## 2021-01-01 RX ADMIN — SIMVASTATIN 20 MILLIGRAM(S): 20 TABLET, FILM COATED ORAL at 21:23

## 2021-01-01 RX ADMIN — CEFEPIME 100 MILLIGRAM(S): 1 INJECTION, POWDER, FOR SOLUTION INTRAMUSCULAR; INTRAVENOUS at 12:55

## 2021-01-01 RX ADMIN — Medication 1 TABLET(S): at 11:56

## 2021-01-01 RX ADMIN — TAMSULOSIN HYDROCHLORIDE 0.4 MILLIGRAM(S): 0.4 CAPSULE ORAL at 22:13

## 2021-01-01 RX ADMIN — FINASTERIDE 5 MILLIGRAM(S): 5 TABLET, FILM COATED ORAL at 12:26

## 2021-01-01 RX ADMIN — PIPERACILLIN AND TAZOBACTAM 25 GRAM(S): 4; .5 INJECTION, POWDER, LYOPHILIZED, FOR SOLUTION INTRAVENOUS at 06:26

## 2021-01-01 RX ADMIN — Medication 6 MILLIGRAM(S): at 19:43

## 2021-01-01 RX ADMIN — SIMVASTATIN 20 MILLIGRAM(S): 20 TABLET, FILM COATED ORAL at 21:42

## 2021-01-01 RX ADMIN — HEPARIN SODIUM 1800 UNIT(S)/HR: 5000 INJECTION INTRAVENOUS; SUBCUTANEOUS at 14:44

## 2021-01-01 RX ADMIN — LATANOPROST 1 DROP(S): 0.05 SOLUTION/ DROPS OPHTHALMIC; TOPICAL at 21:00

## 2021-01-01 RX ADMIN — SIMVASTATIN 20 MILLIGRAM(S): 20 TABLET, FILM COATED ORAL at 21:58

## 2021-01-01 RX ADMIN — Medication 2: at 08:26

## 2021-01-01 RX ADMIN — Medication 2000 UNIT(S): at 12:10

## 2021-01-01 RX ADMIN — CEFEPIME 100 MILLIGRAM(S): 1 INJECTION, POWDER, FOR SOLUTION INTRAMUSCULAR; INTRAVENOUS at 18:20

## 2021-01-01 RX ADMIN — TAMSULOSIN HYDROCHLORIDE 0.4 MILLIGRAM(S): 0.4 CAPSULE ORAL at 21:23

## 2021-01-01 RX ADMIN — MUPIROCIN 1 APPLICATION(S): 20 OINTMENT TOPICAL at 17:45

## 2021-01-01 RX ADMIN — Medication 0: at 08:43

## 2021-01-01 RX ADMIN — Medication 1: at 07:48

## 2021-01-01 RX ADMIN — INSULIN GLARGINE 8 UNIT(S): 100 INJECTION, SOLUTION SUBCUTANEOUS at 21:53

## 2021-01-01 RX ADMIN — AMIODARONE HYDROCHLORIDE 200 MILLIGRAM(S): 400 TABLET ORAL at 05:36

## 2021-01-01 RX ADMIN — APIXABAN 2.5 MILLIGRAM(S): 2.5 TABLET, FILM COATED ORAL at 17:07

## 2021-01-01 RX ADMIN — APIXABAN 2.5 MILLIGRAM(S): 2.5 TABLET, FILM COATED ORAL at 05:36

## 2021-01-01 RX ADMIN — Medication 1 TABLET(S): at 12:36

## 2021-01-01 RX ADMIN — Medication 100 MILLIGRAM(S): at 12:36

## 2021-01-01 RX ADMIN — APIXABAN 2.5 MILLIGRAM(S): 2.5 TABLET, FILM COATED ORAL at 17:12

## 2021-01-01 RX ADMIN — FINASTERIDE 5 MILLIGRAM(S): 5 TABLET, FILM COATED ORAL at 10:08

## 2021-01-01 RX ADMIN — Medication 1: at 12:47

## 2021-01-01 RX ADMIN — Medication 81 MILLIGRAM(S): at 12:01

## 2021-01-01 RX ADMIN — CEFEPIME 100 MILLIGRAM(S): 1 INJECTION, POWDER, FOR SOLUTION INTRAMUSCULAR; INTRAVENOUS at 11:27

## 2021-01-01 RX ADMIN — Medication 1: at 08:22

## 2021-01-01 RX ADMIN — ESCITALOPRAM OXALATE 15 MILLIGRAM(S): 10 TABLET, FILM COATED ORAL at 11:20

## 2021-01-01 RX ADMIN — AMIODARONE HYDROCHLORIDE 200 MILLIGRAM(S): 400 TABLET ORAL at 05:27

## 2021-01-01 RX ADMIN — CEFEPIME 100 MILLIGRAM(S): 1 INJECTION, POWDER, FOR SOLUTION INTRAMUSCULAR; INTRAVENOUS at 11:19

## 2021-01-01 RX ADMIN — Medication 2: at 12:25

## 2021-01-01 RX ADMIN — AMIODARONE HYDROCHLORIDE 200 MILLIGRAM(S): 400 TABLET ORAL at 12:01

## 2021-01-01 RX ADMIN — TOCILIZUMAB 100 MILLIGRAM(S): 20 INJECTION, SOLUTION, CONCENTRATE INTRAVENOUS at 21:44

## 2021-01-01 RX ADMIN — Medication 2000 UNIT(S): at 12:09

## 2021-01-01 RX ADMIN — Medication 6 MILLIGRAM(S): at 06:11

## 2021-01-01 RX ADMIN — Medication 6 MILLIGRAM(S): at 06:39

## 2021-01-01 RX ADMIN — SIMVASTATIN 20 MILLIGRAM(S): 20 TABLET, FILM COATED ORAL at 22:43

## 2021-01-01 RX ADMIN — APIXABAN 2.5 MILLIGRAM(S): 2.5 TABLET, FILM COATED ORAL at 18:07

## 2021-01-01 RX ADMIN — TAMSULOSIN HYDROCHLORIDE 0.4 MILLIGRAM(S): 0.4 CAPSULE ORAL at 21:01

## 2021-01-01 RX ADMIN — Medication 2: at 11:38

## 2021-01-01 RX ADMIN — Medication 100 MILLIGRAM(S): at 11:16

## 2021-01-01 RX ADMIN — FINASTERIDE 5 MILLIGRAM(S): 5 TABLET, FILM COATED ORAL at 12:36

## 2021-01-01 RX ADMIN — Medication 81 MILLIGRAM(S): at 12:10

## 2021-01-01 RX ADMIN — Medication 6 MILLIGRAM(S): at 05:09

## 2021-01-01 RX ADMIN — FINASTERIDE 5 MILLIGRAM(S): 5 TABLET, FILM COATED ORAL at 12:11

## 2021-01-01 RX ADMIN — REMDESIVIR 500 MILLIGRAM(S): 5 INJECTION INTRAVENOUS at 17:24

## 2021-01-01 RX ADMIN — Medication 2: at 18:08

## 2021-01-01 RX ADMIN — Medication 3: at 13:51

## 2021-01-01 RX ADMIN — CHLORHEXIDINE GLUCONATE 1 APPLICATION(S): 213 SOLUTION TOPICAL at 06:29

## 2021-01-01 RX ADMIN — CEFTRIAXONE 100 MILLIGRAM(S): 500 INJECTION, POWDER, FOR SOLUTION INTRAMUSCULAR; INTRAVENOUS at 23:29

## 2021-01-01 RX ADMIN — Medication 1: at 08:03

## 2021-01-01 RX ADMIN — FINASTERIDE 5 MILLIGRAM(S): 5 TABLET, FILM COATED ORAL at 12:10

## 2021-01-01 RX ADMIN — Medication 1: at 16:49

## 2021-01-01 RX ADMIN — Medication 2000 UNIT(S): at 12:00

## 2021-01-01 RX ADMIN — Medication 6 MILLIGRAM(S): at 05:36

## 2021-01-01 RX ADMIN — REMDESIVIR 500 MILLIGRAM(S): 5 INJECTION INTRAVENOUS at 18:07

## 2021-01-01 RX ADMIN — LATANOPROST 1 DROP(S): 0.05 SOLUTION/ DROPS OPHTHALMIC; TOPICAL at 21:48

## 2021-01-01 RX ADMIN — Medication 2: at 12:41

## 2021-01-09 PROBLEM — N39.0 ACUTE LOWER UTI: Status: ACTIVE | Noted: 2020-05-20

## 2021-01-20 NOTE — ED PROVIDER NOTE - NS ED ROS FT
CONSTITUTIONAL (+): c/o weakness, No fevers or chills  EYES/ENT: No visual or hearing changes   NECK: No pain  RESPIRATORY: No cough, shortness of breath  CARDIOVASCULAR: No chest pain, palpitations  GASTROINTESTINAL: No abdominal pain. No nausea, vomiting, diarrhea or constipation.   GENITOURINARY (+): Patient is Cortes catheterized, c/o Urethral tip swelling and tender, h/o Cloudy, foul smelling urine.   NEUROLOGICAL: No numbness or weakness  SKIN: No rashes, or lesions     All other review of systems is negative unless indicated above.

## 2021-01-20 NOTE — ED ADULT NURSE REASSESSMENT NOTE - NS ED NURSE REASSESS COMMENT FT1
Call made to pharmacy to follow up about [facility]openem [facility]. Pharmacy states they did not have tubes and they is why there is a delay. RN to sent tube to pharmacy to have medication sent.

## 2021-01-20 NOTE — H&P ADULT - NSHPREVIEWOFSYSTEMS_GEN_ALL_CORE
REVIEW OF SYSTEMS  CONSTITUTIONAL: + fever, no chills, + fatigue  EYES: No eye pain, + left visual field defect from CVA, hearing loss  ENMT:  No difficulty hearing, no throat pain  RESPIRATORY: + mild cough with sputum, mild shortness of breath  CARDIOVASCULAR: No chest pain, no palpitations,   GASTROINTESTINAL: No abdominal pain, no nausea, no vomiting  GENITOURINARY: + landeros catheter, +dark urine   NEUROLOGICAL: No headaches, no loss of strength, +generalized weakness   SKIN: No itching, no rashes, no lesions   MUSCULOSKELETAL: No joint pain, no joint swelling; No muscle pain  HEME/LYMPH: No easy bruising, bleeding

## 2021-01-20 NOTE — H&P ADULT - PROBLEM SELECTOR PLAN 8
VTE ppx: on eliquis  Activity: OOB with assistance, fall precautions  Diet: DASH, consistent carbohydrate    Start vitamin D supplement

## 2021-01-20 NOTE — ED ADULT NURSE NOTE - PMH
Cerebrovascular accident (CVA), unspecified mechanism    Type 2 diabetes mellitus with other specified complication, unspecified whether long term insulin use    Urinary tract infection associated with indwelling urethral catheter, initial encounter    Ventricular arrhythmia  On Amiodarone

## 2021-01-20 NOTE — H&P ADULT - PROBLEM SELECTOR PLAN 2
The patient has SCr of 1.82. No recent labs are available on sunrise for comparison.  Cortes catheter is in place and draining, thus obstructive uropathy seems unlikely.   Will give 1L IVF challenge and monitor UO  Trend SCr. The patient has SCr of 1.82. [employer] labs indicate SCr was 1.7-1.8 over last year  Cortes catheter is in place and draining, thus obstructive uropathy seems unlikely.   Will give 1L IVF challenge and monitor UO  Trend SCr.

## 2021-01-20 NOTE — ED PROVIDER NOTE - PHYSICAL EXAMINATION
PHYSICAL EXAM:    GENERAL: Lying in bed comfortably  HEAD:  Atraumatic  EYES: EOMI, PERRLA, conjunctiva and sclera clear  ENT: MMM, no erythema/pallor/petechiae  NECK: Supple, No JVD  LUNG: CTA b/l; no r/r/w  HEART: RRR, +S1/S2; No m/r/g  ABDOMEN: soft, NT/ND; BS x audible   EXTREMITIES:  2+ Peripheral Pulses, No clubbing, cyanosis, or edema  NERVOUS SYSTEM:  AAOx3, speech clear. No neurologic deficits   GENITOURINARY: Urethral tip swelling, redness and tenderness. Cortes catheter in place.   SKIN: No rashes or lesions

## 2021-01-20 NOTE — H&P ADULT - HISTORY OF PRESENT ILLNESS
This patient is a 89yo gentleman with PMH of recurrent UTIs, CVA, T2DM who presents to the ED with UTI.  This patient is a 89yo gentleman with PMH of recurrent UTIs, R occipital CVA w/ subsequent L sided weakness and L visual field defect, T2DM who presents to the ED with UTI. The patient had been hospitalized for UTI at [facility] in 2020, and was treated with cipro. He then had UTI in May, July and October of 2020. Each time, he was prescribed cipro. In October 2020, he developed an itchy rash after taking cipro, thus it was discontinued. In November 2020, the patient was treated for UTI with cefuroxime x 5 days. Dr. Yo, patient's urologist, placed a landeros catheter on Dec 23rd 2020 due to "urinary retention, enlarged prostate, aging bladder and heart issues". The patient was given cephalexin 25mg x 7 days.   The patient's catheter was changed in 1/13/2021 by [facility]. He had urine and blood drawn. He was advised to have catheter changed q2 weeks.  He was prescribed amoxicillin clavulanic acid. UCx was positive for pseudomonas. Dr. Yo recommended IV antibiotic due to cipro allergy.     The patient endorses having intermittent fevers for  last 2 weeks, fatigue, generalized weakness and dark appearing urine collected in his landeros catheter. He denies any chills, nausea, vomiting, abdominal pain or flank pain. The patient was noted to have pus and blood leaking from urethral meatus.     At baseline, the patient has HHA for assistance with showering, toileting and mobility. He uses a walker to ambulate. He require assistance with taking medication and fingerstick checks.   Patient typically has cloudy malodorous urine, with worsening L sided weakness, gait imbalance and confusion with UTIs.

## 2021-01-20 NOTE — H&P ADULT - NSICDXPASTSURGICALHX_GEN_ALL_CORE_FT
PAST SURGICAL HISTORY:  Ganglion of left wrist     History of appendectomy     History of cataract extraction, unspecified laterality     Perforated bowel 2003/[place]

## 2021-01-20 NOTE — H&P ADULT - PROBLEM SELECTOR PLAN 6
Start home dose of amiodarone 200mg PO qdaily.  Continue home dose of eliquis 5mg PO BID. Start home dose of amiodarone 200mg PO qdaily.  Start dose of eliquis 2.5mg PO BID- (renal dosing)

## 2021-01-20 NOTE — ED CLERICAL - NS ED CLERK NOTE PRE-ARRIVAL INFORMATION; ADDITIONAL PRE-ARRIVAL INFORMATION
CC/Reason For referral: positive pseudo in urine sensitive to cipro, patient allergic to cipro .  referred for IV abx  Preferred Consultant(if applicable): na  Who admits for you (if needed): na  Do you have documents you would like to fax over? no  Would you still like to speak to an ED attending? call if needed

## 2021-01-20 NOTE — H&P ADULT - ASSESSMENT
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

## 2021-01-20 NOTE — ED PROVIDER NOTE - ATTENDING CONTRIBUTION TO CARE
89 Yr old male with h/o recurrent UTI on Foleys for BPH/Residual urine now with c/o Cloudy foul smelling urine and generalized weakness since past week sent in by his  for admission for uti with pseudomonas drug resistant, no signs of SS on vital signs, discussed with family to admit for meropenem with cipro allergy and gu eval for possible landeros removal.

## 2021-01-20 NOTE — H&P ADULT - ATTENDING COMMENTS
Patient assigned to me by night hospitalist in charge for management and care for patient for this evening only. Care to be continued by day hospitalist in the morning and thereafter.  Pooja Irving MD [phone]

## 2021-01-20 NOTE — ED ADULT NURSE REASSESSMENT NOTE - NS ED NURSE REASSESS COMMENT FT1
Per Radha Moses (HCP) please give Dr Lilia Mccauley access to patient medical records.  HCP information placed in paper charts.

## 2021-01-20 NOTE — ED ADULT NURSE NOTE - OBJECTIVE STATEMENT
89 y.o M with PMHx CVA, and multiple UTIs sent in for IV abx for pseudomonas after having urine culture done. Pt is currently on amoxicillin. Pt is at baseline mental status. Denies fever, n/v, loss of appetite, or abd pain. Pt stat lock changed and urine sent form the indwelling catheter Pt is not sure why he is here.  Pt was q doc and labs sent Pt head of Penis re ad swollen and some blood noted Mpuleorn

## 2021-01-20 NOTE — H&P ADULT - PROBLEM SELECTOR PLAN 1
The patient was recently diagnosed with Pseudomonas UTI with sensitivity to cipro, levofloxacin and meropenem. Due to the patient's allergy to cipro, and suspected drug class allergy to fluoroquinolones, the patient is admitted for IV antibiotics.  UA is grossly positive. Patient is not septic at this time.   Follow up urine culture. Check blood cultures.  Patient has a calculated GFR of 30, thus will dose meropenem 1g IV q12h. The patient was recently diagnosed with Pseudomonas UTI- report on [employer], with sensitivity to meropenem, cefepime, fluoroquinolones, zosyn.   Due to the patient's allergy to cipro, and suspected drug class allergy to fluoroquinolones, the patient is admitted for IV antibiotics.  UA is grossly positive. Patient is not septic at this time.   Follow up urine culture. Check blood cultures.  Pt received first dose of meropenem. Will switch to cefepime (UCx listed susceptibility) 2g q12h due to creatinine clearance of 30. The patient was recently diagnosed with Pseudomonas UTI- report on [employer], with sensitivity to meropenem, cefepime, fluoroquinolones, zosyn.   Due to the patient's allergy to cipro, and suspected drug class allergy to fluoroquinolones, the patient is admitted for IV antibiotics.  UA is grossly positive. Patient is not septic at this time.   Follow up urine culture. Check blood cultures.  Pt received first dose of meropenem. Will switch to cefepime (UCx listed susceptibility) 2g q24h due to creatinine clearance of 30 (renal dosing)

## 2021-01-20 NOTE — ED PROVIDER NOTE - PROGRESS NOTE DETAILS
Joseph Frankel PGY2: labs wnl. UA with UTI. Per family, patients stroke symptoms have been worse which is normal with is UTI. Offered for patient to try levoquin as patient mild rash after cipro but has had the drug in the past before with no issues. Outpatient urologist does not want to give him fluoroquinolones because of potential reaction. had extensive conversation on phone with family regarding risks and benefits of admission to hospital. Ultimately they do not feel comfortable taking him home with fluoroquinolone if he could have reaction. Will give walter, and admit. Patient currently stable.

## 2021-01-20 NOTE — ED PROVIDER NOTE - OBJECTIVE STATEMENT
89 Yr old male with h/o recurrent UTI on Foleys for BPH/Residual urine now with c/o Cloudy foul smelling urine and generalized weakness since past week. Spoke to the grand daughter Lilia (physician at [facility]) who says the patient has a h/o Admission at [facility] a year ago when he developed UTI which was treated on Antibiotics. During December 2020 he was catheterized due to residual urine sec. to BPH and since then he has been having frequent UTIs requiring course of oral antibiotics. The patient follows regularly with urologist Dr. Cool. He was testing him Urine C/S and called back to inform that there is Pseudomonas that is sensitive to Meropenem, Ciprofloxacin, Levoquin (Report is unavailable). He was referred to ED for an evaluation and admission.

## 2021-01-20 NOTE — H&P ADULT - PROBLEM SELECTOR PLAN 3
Liver enzymes are mildly elevated.  Previous labs on [place] are from 2008. Check patient's recent outpatient labs.  If liver enzymes are acutely elevated, check hepatitis panel and hepatic US. Liver enzymes are mildly elevated, rising since July 2020.  Unclear if patient has had previous workup. Obtain hepatic US

## 2021-01-20 NOTE — ED PROVIDER NOTE - CLINICAL SUMMARY MEDICAL DECISION MAKING FREE TEXT BOX
89 Yr old DM CVA h/o Vent Arrhythmia s/p Cortes cath h/o recurrent UTI h/o cloudy foul smelling urine with lethargy and recently informed by Urologist to come to ED for IV Antibiotic treatment. O/E VS Swollen, red and tender Urethral tip. Investigate and treat. +/- Admission.

## 2021-01-20 NOTE — ED ADULT NURSE NOTE - NSIMPLEMENTINTERV_GEN_ALL_ED
Implemented All Fall Risk Interventions:  Carlos to call system. Call bell, personal items and telephone within reach. Instruct patient to call for assistance. Room bathroom lighting operational. Non-slip footwear when patient is off stretcher. Physically safe environment: no spills, clutter or unnecessary equipment. Stretcher in lowest position, wheels locked, appropriate side rails in place. Provide visual cue, wrist band, yellow gown, etc. Monitor gait and stability. Monitor for mental status changes and reorient to person, place, and time. Review medications for side effects contributing to fall risk. Reinforce activity limits and safety measures with patient and family.

## 2021-01-20 NOTE — H&P ADULT - NSICDXPASTMEDICALHX_GEN_ALL_CORE_FT
PAST MEDICAL HISTORY:  Cerebrovascular accident (CVA), unspecified mechanism     Type 2 diabetes mellitus with other specified complication, unspecified whether long term insulin use     Urinary tract infection associated with indwelling urethral catheter, initial encounter     Ventricular arrhythmia On Amiodarone

## 2021-01-20 NOTE — H&P ADULT - NSHPPHYSICALEXAM_GEN_ALL_CORE
T(C): 36.5 (01-20-21 @ 22:45), Max: 36.5 (01-20-21 @ 15:30)  HR: 51 (01-20-21 @ 22:45) (50 - 66)  BP: 167/74 (01-20-21 @ 22:45) (124/83 - 176/69)  RR: 18 (01-20-21 @ 22:45) (16 - 18)  SpO2: 97% (01-20-21 @ 22:45) (97% - 98%)  Wt(kg): --    PHYSICAL EXAM:  GENERAL: NAD, well-groomed, well-developed  HEAD:  Atraumatic, Normocephalic  EYES: closes left eye during exam, EOMI  ENMT: wearing mask, no nasal discharge  NECK: Supple, no cervical lymphadenopathy  NERVOUS SYSTEM:  Alert & Oriented X2 (name, birthday, not date, thinks he is in a [facility] hospital), CN II-XII intact, 5/5 BUE and BLE motor strength, follows commands, responds to questions with slow speech, full sensation to light touch   CHEST/LUNG: Trace crackles bilateral lung fields, no rhonchi, no wheezing  HEART: Distant heart sounds, Regular rate and rhythm;   ABDOMEN: Soft, Nontender, Nondistended  EXTREMITIES:  2+ radial Pulses, No cyanosis   : No CVAT, Cortes catheter in place draining dark yellow nonbloody urine  SKIN: warm, dry, L knee abrasion (due to HHA with walker)

## 2021-01-20 NOTE — H&P ADULT - NSHPOUTPATIENTPROVIDERS_GEN_ALL_CORE
Dr. Kenan Do-[facility]  Dr. Randolph Ford=- Cardiologist  Dr. Yo- Urologist

## 2021-01-20 NOTE — ED ADULT NURSE REASSESSMENT NOTE - NS ED NURSE REASSESS COMMENT FT1
Report received from FERNANDO Rodrigues. Pt AAOx3, disoriented to situation, NAD, resp nonlabored, skin warm/dry, resting comfortably in bed. Pt c/o 10 urinary infections. Pt denies headache, dizziness, chest pain, palpitations, SOB, abd pain, n/v/d, fevers, chills at this time. Pt awaiting abx. Safety maintained.

## 2021-01-20 NOTE — ED PROVIDER NOTE - PSH
Ganglion of left wrist    History of appendectomy    History of cataract extraction, unspecified laterality    Perfor[facility] bowel  [facility]

## 2021-01-20 NOTE — ED PROVIDER NOTE - RAPID ASSESSMENT
89 y.o M with PMHx CVA, and multiple UTIs sent in for IV abx for pseudomonas after having urine culture done. Pt is currently on amoxicillin. Pt is at baseline mental status. Denies fever, n/v, loss of appetite, or abd pain.     Scribe Statement: Jazmyn STRATTON Tiffany, attest that this documentation has been prepared under the direction and in the presence of Alla Og) 89 y.o M with PMHx CVA, and multiple UTIs sent in for IV abx for pseudomonas after having urine culture done. Pt is currently on amoxicillin. Pt is at baseline mental status. Denies fever, n/v, loss of appetite, or abd pain.     Scribe Statement: I, Yael Eldridge, attest that this documentation has been prepared under the direction and in the presence of Alla Og (MD)    I, Dr. Og, personally performed the service described in the documentation recorded by the scribe in my presence, and it accurately and completely records my words and actions.

## 2021-01-20 NOTE — H&P ADULT - PROBLEM SELECTOR PLAN 4
Will hold oral hypoglycemic agents.  Start premeal and qhs fingerstick blood sugar checks.    Start premeal and qhs sliding scale lispro. Adjust based on glycemic requirements.  Check A1C

## 2021-01-20 NOTE — ED ADULT NURSE REASSESSMENT NOTE - NS ED NURSE REASSESS COMMENT FT1
Call made by RN to pharmacy to have mer[facility]em [facility]. RN to administer medication once it arrives.

## 2021-01-21 NOTE — ED ADULT NURSE REASSESSMENT NOTE - NS ED NURSE REASSESS COMMENT FT1
Patient updated on plan of care, admitted, waiting on bed assignment, and waiting on medication from pharmacy. Resting comfortably in stretcher, provided with more blankets. Will continue to monitor.

## 2021-01-21 NOTE — ED ADULT NURSE REASSESSMENT NOTE - NS ED NURSE REASSESS COMMENT FT1
Third call made to pharmacy to have meropenem sent to [facility] tube station. Pharm[facility] to [facility] to [facility] now. Will administer medication to patient once it arrives.

## 2021-01-21 NOTE — CONSULT NOTE ADULT - PROBLEM SELECTOR RECOMMENDATION 9
Outpatient urine culture grew Pseudomonas aeruginosa  Patient now with allergy to ciprofloxacin  S/p meropenem x1 in the ER,  continued on cefepime  UA on admission with , lg LE, +nitirte and few bacteria  Repeat UA and urine culture from exchanged landeros ordered    Follow urine and blood cultures  Continue on cefepime 2g IV Q24h (renally adjusted)  Abd US as above, reported medical renal disease

## 2021-01-21 NOTE — CONSULT NOTE ADULT - ASSESSMENT
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

## 2021-01-21 NOTE — CONSULT NOTE ADULT - ATTENDING COMMENTS
Kike Smalls M.D.  [facility], Division of Infectious Diseases  [phone]  After 5pm on weekdays and all day on weekends - please call [phone]

## 2021-01-21 NOTE — ED ADULT NURSE REASSESSMENT NOTE - NS ED NURSE REASSESS COMMENT FT1
Patient asking repetitive questions, states he was not updated on plan of care but RN was in the room just over a half hour ago updating patient on plan of care. Meropenem started. Patient made comfortable, made aware awaiting bed assignment.

## 2021-01-21 NOTE — CONSULT NOTE ADULT - PROBLEM SELECTOR RECOMMENDATION 2
placed for urinary retention, has h/o recurrent UTIs treated with ciprofloxacin but developed rash/hives. Currently draining clear yellow urine.

## 2021-01-21 NOTE — CONSULT NOTE ADULT - SUBJECTIVE AND OBJECTIVE BOX
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 Patients history obtained from chart, patient is a poor historian. Patient had been hospitalized for UTI at [facility] in , and was treated with ciprofloxacin. He then had UTI in May, July and 2020. Each time, he was prescribed ciprofloxacin. In 2020, he developed an itchy rash after taking ciprofloxacin and so it was discontinued. In 2020, the patient was treated for UTI with cefuroxime x 5 days. Dr. Yo, patient's urologist, placed a landeros catheter on Dec 23rd 2020 due to "urinary retention, enlarged prostate, aging bladder and heart issues." Patient was given cephalexin 250mg x 7 days.   Patients landeros catheter was last changed 2021 by [facility]. He had urine and blood drawn. He was advised to have catheter changed every 2 weeks. Patient was prescribed amoxicillin clavulanic acid. His urine  culture grew pansensitive Pseudomonas aeruginoa and urologist referred patient to the ER for IV antibiotics. Patient cortez reported having intermittent fevers for last 2 weeks, fatigue, generalized weakness and dark appearing urine collected in his landeros catheter. He denied any chills, nausea, vomiting, abdominal pain or flank pain. The patient was noted to have pus and blood leaking from urethral meatus. At baseline, the patient has A for assistance with showering, toileting and mobility. He uses a walker to ambulate. He require assistance with taking medication and fingerstick checks. Patient typically has cloudy malodorous urine, with worsening L sided weakness, gait imbalance and confusion with UTIs.   Patient seen this morning in the ER, patient confused, states he returned from [place] this morning. When asked about his urine, states "who looks." He denies having any fever or chills and states he has been urinating more frequently. Asked patient if he was noticing increased output in landeros but patient unable to answer question. He denies having pain anywhere, denies nausea, vomiting, diarrhea. He states he feels fine. Reviewed outpatient urine culture.   ROS: 14 point review of systems completed, pertinent positives and negatives as per HPI.    Allergies: Cipro (Hives)  fish (Unknown)    PMH -- Urinary tract infection associated with indwelling urethral catheter, initial encounter  Ventricular arrhythmia  Cerebrovascular accident (CVA), unspecified mechanism  Type 2 diabetes mellitus with other specified complication, unspecified whether long term insulin use    PSH -- Ganglion of left wrist  History of cataract extraction, unspecified laterality  Perforated bowel  History of appendectomy    FH -- FH: myocardial infarction    Social History -- denies tobacco, alcohol or illicit drug use    Physical Exam--  Vital Signs Last 24 Hrs  T(F): 98.7 (2021 16:20), Max: 98.7 (2021 16:20)  HR: 60 (2021 16:20) (50 - 73)  BP: 173/90 (2021 16:20) (124/83 - 176/69)  RR: 18 (2021 16:20) (16 - 19)  SpO2: 98% (2021 16:20) (95% - 98%)  General: no acute distress  HEENT: NC/AT, EOMI, anicteric, conjunctiva pink and moist, neck supple  Lungs: Clear bilaterally without rales, wheezing or rhonchi  Heart: Regular rate and rhythm. No murmur, rub or gallop.  Abdomen: Soft. Nondistended. Nontender. BS present. No organomegaly.  Neuro: AAOx self, no obvious focal deficits   Back: No spinal tenderness. No costovertebral angle tenderness.  Extremities: No cyanosis or clubbing. No edema.   Skin: Warm. Dry. Good turgor. No rash. No vasculitic stigmata.  Psychiatric: Appropriate affect and mood for situation.   Lines: PIV looks ok  Landeros with clear yellow urine     Laboratory & Imaging Data--  CBC:                       14.2   9.57  )-----------( 236      ( 2021 18:13 )             42.5     CMP:     133<L>  |  100  |  28<H>  ----------------------------<  132<H>  4.4   |  23  |  1.82<H>    Ca    9.5      2021 18:13    TPro  7.4  /  Alb  3.6  /  TBili  0.4  /  DBili  x   /  AST  54<H>  /  ALT  87<H>  /  AlkPhos  91      LIVER FUNCTIONS - ( 2021 18:13 )  Alb: 3.6 g/dL / Pro: 7.4 g/dL / ALK PHOS: 91 U/L / ALT: 87 U/L / AST: 54 U/L / GGT: x           Urinalysis Basic - ( 2021 19:04 )  Color: Yellow / Appearance: Slightly Turbid / S.021 / pH: x  Gluc: x / Ketone: Negative  / Bili: Negative / Urobili: Negative   Blood: x / Protein: Trace / Nitrite: Positive   Leuk Esterase: Large / RBC: 1 /hpf /  /HPF   Sq Epi: x / Non Sq Epi: 0 /hpf / Bacteria: Few    Microbiology:    - COVID-19 Ab - Positive (6.60)   - COVID-19 PCR - negative    Radiology--  US Abdomen Complete (US Abdomen Complete .) (21 @ 10:00) >IMPRESSION: Hepatic steatosis. Cholelithiasis. Bilateral renal cortical echogenicity, may be seen in the setting of medical renal disease. A right pleural effusion.    Active Medications--  aMIOdarone    Tablet 200 milliGRAM(s) Oral daily  apixaban 2.5 milliGRAM(s) Oral two times a day  aspirin enteric coated 81 milliGRAM(s) Oral daily  cefepime   IVPB 2000 milliGRAM(s) IV Intermittent every 24 hours  cholecalciferol 2000 Unit(s) Oral daily  dextrose 40% Gel 15 Gram(s) Oral once  dextrose 5%. 1000 milliLiter(s) IV Continuous <Continuous>  dextrose 5%. 1000 milliLiter(s) IV Continuous <Continuous>  dextrose 50% Injectable 25 Gram(s) IV Push once  dextrose 50% Injectable 12.5 Gram(s) IV Push once  dextrose 50% Injectable 25 Gram(s) IV Push once  escitalopram 15 milliGRAM(s) Oral daily  finasteride 5 milliGRAM(s) Oral daily  glucagon  Injectable 1 milliGRAM(s) IntraMuscular once  insulin lispro (ADMELOG) corrective regimen sliding scale   SubCutaneous three times a day before meals  insulin lispro (ADMELOG) corrective regimen sliding scale   SubCutaneous at bedtime  latanoprost 0.005% Ophthalmic Solution 1 Drop(s) Both EYES at bedtime  simvastatin 20 milliGRAM(s) Oral at bedtime  sodium chloride 0.9% with potassium chloride 20 mEq/L 1000 milliLiter(s) IV Continuous <Continuous>  tamsulosin 0.4 milliGRAM(s) Oral at bedtime    Antimicrobials:   cefepime   IVPB 2000 milliGRAM(s) IV Intermittent every 24 hours - started   s/p meropenem in ER

## 2021-01-21 NOTE — ED ADULT NURSE REASSESSMENT NOTE - NS ED NURSE REASSESS COMMENT FT1
Patient yelling from room that he has not been updated or received his antibiotics. RN made patient aware antibiotics infused through IV, and RN has been updating patient on plan of care. Patient receiving continuous fluids. Will continue to monitor.

## 2021-01-22 NOTE — PROGRESS NOTE ADULT - ATTENDING COMMENTS
Over the weekend, Dr. Samantha Vela will be covering for our group. If you have any questions please reach out at [phone].    Kike Smalls M.D.  [facility], Division of Infectious Diseases  [phone]  After 5pm on weekdays and all day on weekends - please call [phone]

## 2021-01-22 NOTE — PHYSICAL THERAPY INITIAL EVALUATION ADULT - PRECAUTIONS/LIMITATIONS, REHAB EVAL
Pt's urologist, placed a landeros catheter on Dec 23rd 2020 due to "urinary retention, enlarged prostate, aging bladder and heart issues".  UCx was positive for pseudomonas.   The patient endorses having intermittent fevers for  last 2 weeks, fatigue, generalized weakness and dark appearing urine collected in his landeros catheter. The patient was noted to have pus and blood leaking from urethral meatus.

## 2021-01-22 NOTE — PROGRESS NOTE ADULT - PROBLEM SELECTOR PLAN 1
Outpatient urine culture grew Pseudomonas aeruginosa  Patient now with allergy to ciprofloxacin  Cortes exchanged  S/p meropenem x1 in the ER, continued on cefepime  UA , lg LE, +nitrite, few bacteria - culture growing Pseudomonas   Repeat UA 1/21 with , lg LE, +nitrite, no bacteria  Blood cultures NGTD x2    Follow urine cultures  Continue on cefepime 2g IV Q24h (renally adjusted)

## 2021-01-22 NOTE — DISCHARGE NOTE NURSING/CASE MANAGEMENT/SOCIAL WORK - PATIENT PORTAL LINK FT
You can access the FollowMyHealth Patient Portal offered by [facility] by registering at the following website: [email or web address]. By joining [facility]’s FollowMyHealth portal, you will also be able to view your health information using other applications (apps) compatible with our system.

## 2021-01-22 NOTE — PHYSICAL THERAPY INITIAL EVALUATION ADULT - PERTINENT HX OF CURRENT PROBLEM, REHAB EVAL
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 The patient had been hospitalized for UTI at [facility] in 2020, and was treated with cipro. He then had UTI in May, July and October of 2020.

## 2021-01-22 NOTE — CHART NOTE - NSCHARTNOTEFT_GEN_A_CORE
Sasha Hernandez   MRN: [id number]    Nurse informed that patient was confused and trying to leave the hospital. Patient was observed at bedside, no acute distress, but insistent on leaving the hospital. Patient is non- combative nor agitated.     Vital Signs Last 24 Hrs  T(C): 36.3 (21 Jan 2021 21:27), Max: 37.1 (21 Jan 2021 16:20)  T(F): 97.4 (21 Jan 2021 21:27), Max: 98.7 (21 Jan 2021 16:20)  HR: 58 (21 Jan 2021 21:27) (50 - 73)  BP: 175/80 (21 Jan 2021 21:27) (144/68 - 175/80)  BP(mean): 88 (21 Jan 2021 02:34) (88 - 88)  RR: 18 (21 Jan 2021 21:27) (18 - 19)  SpO2: 96% (21 Jan 2021 21:27) (95% - 98%)      Labs:                          14.2   9.57  )-----------( 236      ( 20 Jan 2021 18:13 )             42.5     01-20    133<L>  |  100  |  28<H>  ----------------------------<  132<H>  4.4   |  23  |  1.82<H>    Ca    9.5      20 Jan 2021 18:13    TPro  7.4  /  Alb  3.6  /  TBili  0.4  /  DBili  x   /  AST  54<H>  /  ALT  87<H>  /  AlkPhos  91  01-20        Assessment & Plan:  HPI:  89yo gentleman with PMH of recurrent UTIs, 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     A&P  Dx: Confusion most likely due to recurrent UTIs vs aging   - No changes from baseline  - No new neurological changes, will get CTH if changes arise   - Will continue to monitor patient   - Will continue to f/u with primary AM team     Cassandra MCCLENDON [address] Sasha Hernandez   MRN: [id number]    Nurse informed that patient was confused and trying to leave the hospital. Patient was observed at bedside, no acute distress, but insistent on leaving the hospital. Patient is non- combative nor agitated.     Vital Signs Last 24 Hrs  T(C): 36.3 (21 Jan 2021 21:27), Max: 37.1 (21 Jan 2021 16:20)  T(F): 97.4 (21 Jan 2021 21:27), Max: 98.7 (21 Jan 2021 16:20)  HR: 58 (21 Jan 2021 21:27) (50 - 73)  BP: 175/80 (21 Jan 2021 21:27) (144/68 - 175/80)  BP(mean): 88 (21 Jan 2021 02:34) (88 - 88)  RR: 18 (21 Jan 2021 21:27) (18 - 19)  SpO2: 96% (21 Jan 2021 21:27) (95% - 98%)      Labs:                          14.2   9.57  )-----------( 236      ( 20 Jan 2021 18:13 )             42.5     01-20    133<L>  |  100  |  28<H>  ----------------------------<  132<H>  4.4   |  23  |  1.82<H>    Ca    9.5      20 Jan 2021 18:13    TPro  7.4  /  Alb  3.6  /  TBili  0.4  /  DBili  x   /  AST  54<H>  /  ALT  87<H>  /  AlkPhos  91  01-20        Assessment & Plan:  HPI:  89yo gentleman with PMH of recurrent UTIs, 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     A&P  Dx: Confusion most likely due to recurrent UTIs vs dementia w/behavioral disturbances  - No changes from baseline  - No new neurological changes, will get CTH if changes arise   - Will continue to monitor patient   - Will continue to f/u with primary AM team     Cassandra MCCLENDON [id number]

## 2021-01-22 NOTE — CONSULT NOTE ADULT - SUBJECTIVE AND OBJECTIVE BOX
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 Pt had landeros placed in  for urinary retention, last changed 21 by [facility].  Currently unable to obtain any history from patient but medical record was reviewed.       PAST MEDICAL & SURGICAL HISTORY:  Urinary tract infection associated with indwelling urethral catheter, initial encounter    Ventricular arrhythmia  On Amiodarone    Cerebrovascular accident (CVA), unspecified mechanism    Type 2 diabetes mellitus with other specified complication, unspecified whether long term insulin use    Ganglion of left wrist    History of cataract extraction, unspecified laterality    Perforated bowel  /[facility]    History of appendectomy      FAMILY HISTORY:  FH: myocardial infarction  parents      SOCIAL HISTORY:   Tobacco hx:  MEDICATIONS  (STANDING):  aMIOdarone    Tablet 200 milliGRAM(s) Oral daily  apixaban 2.5 milliGRAM(s) Oral two times a day  aspirin enteric coated 81 milliGRAM(s) Oral daily  cefepime   IVPB 2000 milliGRAM(s) IV Intermittent every 24 hours  cholecalciferol 2000 Unit(s) Oral daily  dextrose 40% Gel 15 Gram(s) Oral once  dextrose 5%. 1000 milliLiter(s) (50 mL/Hr) IV Continuous <Continuous>  dextrose 5%. 1000 milliLiter(s) (100 mL/Hr) IV Continuous <Continuous>  dextrose 50% Injectable 25 Gram(s) IV Push once  dextrose 50% Injectable 12.5 Gram(s) IV Push once  dextrose 50% Injectable 25 Gram(s) IV Push once  escitalopram 15 milliGRAM(s) Oral daily  finasteride 5 milliGRAM(s) Oral daily  glucagon  Injectable 1 milliGRAM(s) IntraMuscular once  insulin lispro (ADMELOG) corrective regimen sliding scale   SubCutaneous three times a day before meals  insulin lispro (ADMELOG) corrective regimen sliding scale   SubCutaneous at bedtime  latanoprost 0.005% Ophthalmic Solution 1 Drop(s) Both EYES at bedtime  simvastatin 20 milliGRAM(s) Oral at bedtime  sodium chloride 0.9% with potassium chloride 20 mEq/L 1000 milliLiter(s) (50 mL/Hr) IV Continuous <Continuous>  tamsulosin 0.4 milliGRAM(s) Oral at bedtime    MEDICATIONS  (PRN):    Allergies    Cipro (Hives)  fish (Unknown)    Intolerances    Pollen, hayfever (Unknown)      REVIEW OF SYSTEMS: Pertinent positives and negatives as stated in HPI, otherwise negative    Vital signs  T(C): 36.3 (21 @ 14:23), Max: 37.1 (21 @ 04:17)  HR: 64 (21 @ 14:23)  BP: 145/69 (21 @ 14:23)  SpO2: 94% (21 @ 14:23)    Physical Exam  Gen: NAD  Pulm: No respiratory distress, no subcostal retractions  CV: RRR, no JVD  Abd: Soft, NT, ND  : Uncircumcised No discharge or blood at urethral meatus.  Testes descended bilaterally. landeros draining concentrated urine    LABS:     @ 06:48    WBC 9.06  / Hct 37.0  / SCr --        @ 06:46    WBC --    / Hct --    / SCr 1.61         137  |  102  |  24<H>  ----------------------------<  95  3.9   |  21<L>  |  1.61<H>    Ca    8.7      2021 06:46    TPro  6.3  /  Alb  3.2<L>  /  TBili  0.5  /  DBili  x   /  AST  47<H>  /  ALT  71<H>  /  AlkPhos  70        Urinalysis Basic - ( 2021 19:31 )    Color: Yellow / Appearance: Clear / S.020 / pH: x  Gluc: x / Ketone: Negative  / Bili: Negative / Urobili: Negative   Blood: x / Protein: Trace / Nitrite: Positive   Leuk Esterase: Large / RBC: 1 /hpf /  /HPF   Sq Epi: x / Non Sq Epi: 0 /hpf / Bacteria: Negative    Urine Cx: uCulture - Urine (21 @ 23:23)   - Amikacin: S <=16   - Aztreonam: S <=4   - Cefepime: S <=2   - Ceftazidime: S <=1   - Ciprofloxacin: S <=0.25   - Gentamicin: S <=2   - Imipenem: S <=1   - Levofloxacin: S <=0.5   - Meropenem: S <=1   - Piperacillin/Tazobactam: S <=8   - Tobramycin: S <=2   Specimen Source: .Urine Catheterized   Culture Results:   >100,000 CFU/ml Pseudomonas aeruginosa   Organism Identification: Pseudomonas aeruginosa   Organism: Pseudomonas aeruginosa   Method Type: GERALD      Radiology:  < from: US Abdomen Complete (US Abdomen Complete .) (21 @ 10:00) >  IMPRESSION:    Hepatic steatosis.    Cholelithiasis.    Bilateral renal cortical echogenicity, may be seen in the setting of medical renal disease.    A right pleural effusion.    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 Pt had landeros placed in  for urinary retention, last changed 21 by [facility].  Currently unable to obtain any history from patient but medical record was reviewed.       PAST MEDICAL & SURGICAL HISTORY:  Urinary tract infection associated with indwelling urethral catheter, initial encounter    Ventricular arrhythmia  On Amiodarone    Cerebrovascular accident (CVA), unspecified mechanism    Type 2 diabetes mellitus with other specified complication, unspecified whether long term insulin use    Ganglion of left wrist    History of cataract extraction, unspecified laterality    Perforated bowel  /[facility]    History of appendectomy      FAMILY HISTORY:  FH: myocardial infarction  parents      SOCIAL HISTORY:   Tobacco hx:  MEDICATIONS  (STANDING):  aMIOdarone    Tablet 200 milliGRAM(s) Oral daily  apixaban 2.5 milliGRAM(s) Oral two times a day  aspirin enteric coated 81 milliGRAM(s) Oral daily  cefepime   IVPB 2000 milliGRAM(s) IV Intermittent every 24 hours  cholecalciferol 2000 Unit(s) Oral daily  dextrose 40% Gel 15 Gram(s) Oral once  dextrose 5%. 1000 milliLiter(s) (50 mL/Hr) IV Continuous <Continuous>  dextrose 5%. 1000 milliLiter(s) (100 mL/Hr) IV Continuous <Continuous>  dextrose 50% Injectable 25 Gram(s) IV Push once  dextrose 50% Injectable 12.5 Gram(s) IV Push once  dextrose 50% Injectable 25 Gram(s) IV Push once  escitalopram 15 milliGRAM(s) Oral daily  finasteride 5 milliGRAM(s) Oral daily  glucagon  Injectable 1 milliGRAM(s) IntraMuscular once  insulin lispro (ADMELOG) corrective regimen sliding scale   SubCutaneous three times a day before meals  insulin lispro (ADMELOG) corrective regimen sliding scale   SubCutaneous at bedtime  latanoprost 0.005% Ophthalmic Solution 1 Drop(s) Both EYES at bedtime  simvastatin 20 milliGRAM(s) Oral at bedtime  sodium chloride 0.9% with potassium chloride 20 mEq/L 1000 milliLiter(s) (50 mL/Hr) IV Continuous <Continuous>  tamsulosin 0.4 milliGRAM(s) Oral at bedtime    MEDICATIONS  (PRN):    Allergies    Cipro (Hives)  fish (Unknown)    Intolerances    Pollen, hayfever (Unknown)      REVIEW OF SYSTEMS: Pertinent positives and negatives as stated in HPI, otherwise negative    Vital signs  T(C): 36.3 (21 @ 14:23), Max: 37.1 (21 @ 04:17)  HR: 64 (21 @ 14:23)  BP: 145/69 (21 @ 14:23)  SpO2: 94% (21 @ 14:23)    Physical Exam  Gen: NAD  Pulm: No respiratory distress, no subcostal retractions  CV: RRR, no JVD  Abd: Soft, NT, ND  : Uncircumcised, foreskin retracted and swollen, gland erythematous-paraphimosis reduced with improvement of pain   No discharge or blood at urethral meatus.  Testes descended bilaterally. Landeros draining concentrated urine    LABS:     @ 06:48    WBC 9.06  / Hct 37.0  / SCr --        @ 06:46    WBC --    / Hct --    / SCr 1.61         137  |  102  |  24<H>  ----------------------------<  95  3.9   |  21<L>  |  1.61<H>    Ca    8.7      2021 06:46    TPro  6.3  /  Alb  3.2<L>  /  TBili  0.5  /  DBili  x   /  AST  47<H>  /  ALT  71<H>  /  AlkPhos  70        Urinalysis Basic - ( 2021 19:31 )    Color: Yellow / Appearance: Clear / S.020 / pH: x  Gluc: x / Ketone: Negative  / Bili: Negative / Urobili: Negative   Blood: x / Protein: Trace / Nitrite: Positive   Leuk Esterase: Large / RBC: 1 /hpf /  /HPF   Sq Epi: x / Non Sq Epi: 0 /hpf / Bacteria: Negative    Urine Cx: uCulture - Urine (21 @ 23:23)   - Amikacin: S <=16   - Aztreonam: S <=4   - Cefepime: S <=2   - Ceftazidime: S <=1   - Ciprofloxacin: S <=0.25   - Gentamicin: S <=2   - Imipenem: S <=1   - Levofloxacin: S <=0.5   - Meropenem: S <=1   - Piperacillin/Tazobactam: S <=8   - Tobramycin: S <=2   Specimen Source: .Urine Catheterized   Culture Results:   >100,000 CFU/ml Pseudomonas aeruginosa   Organism Identification: Pseudomonas aeruginosa   Organism: Pseudomonas aeruginosa   Method Type: GERALD      Radiology:  < from: US Abdomen Complete (US Abdomen Complete .) (21 @ 10:00) >  IMPRESSION:    Hepatic steatosis.    Cholelithiasis.    Bilateral renal cortical echogenicity, may be seen in the setting of medical renal disease.    A right pleural effusion.    < end of copied text >

## 2021-01-22 NOTE — DISCHARGE NOTE NURSING/CASE MANAGEMENT/SOCIAL WORK - NSDCPEWEB_GEN_ALL_CORE
[facility] for Tobacco Control website --- [email or web address] --- [email or web address]

## 2021-01-22 NOTE — PROGRESS NOTE ADULT - PROBLEM SELECTOR PLAN 1
landeros exchanged  urine culture pseudomonas  BC NTD  cont cefepime  appreciate ID recs ladneros exchanged  urine culture pseudomonas  BC NTD  cont cefepime  appreciate ID recs  urology consult follows Dr. Yo, landeros placed 1 month ago and dtr questioning TOV

## 2021-01-23 NOTE — PROGRESS NOTE ADULT - ATTENDING COMMENTS
dispo outpt fu with urology for TOV once UTI treat, fu ID re abx recs, possible DC to home if can switch to oral equivalent  questionable cipro allery dw dtr and recommended to fu with allergy outpt    dw dtr over the phone

## 2021-01-23 NOTE — PROGRESS NOTE ADULT - PROBLEM SELECTOR PLAN 1
landeros exchanged  urine culture pseudomonas aeroginosa  BC NTD  cont cefepime  appreciate ID recs  appreciate urology consult, recommend to cont landeros until UTI treated and to fu for TOV

## 2021-01-24 NOTE — PROGRESS NOTE ADULT - PROBLEM SELECTOR PLAN 1
landeros exchanged  urine culture pseudomonas aeruginosa  BC NTD  cont cefepime, given allergy to cipro, will have to cont IV cefepime until 2/2  appreciate ID recs  appreciate urology consult, recommend to cont landeros until UTI treated and to fu for TOV

## 2021-01-25 NOTE — PROGRESS NOTE ADULT - PROBLEM SELECTOR PROBLEM 3
T2DM (type 2 diabetes mellitus)
T2DM (type 2 diabetes mellitus)
Elevated liver enzymes

## 2021-01-25 NOTE — PROGRESS NOTE ADULT - PROBLEM SELECTOR PROBLEM 5
History of CVA (cerebrovascular accident)

## 2021-01-25 NOTE — PROGRESS NOTE ADULT - PROBLEM SELECTOR PLAN 4
antibiotics renally adjusted as above.
Will hold oral hypoglycemic agents.  premeal and qhs fingerstick blood sugar checks.    premeal and qhs sliding scale lispro. Adjust based on glycemic requirements.
Will hold oral hypoglycemic agents.  Start premeal and qhs fingerstick blood sugar checks.    Start premeal and qhs sliding scale lispro. Adjust based on glycemic requirements.  Check A1C
Will hold oral hypoglycemic agents.  Start premeal and qhs fingerstick blood sugar checks.    Start premeal and qhs sliding scale lispro. Adjust based on glycemic requirements.  Check A1C
antibiotics renally adjusted as above.
Will hold oral hypoglycemic agents.  Start premeal and qhs fingerstick blood sugar checks.    Start premeal and qhs sliding scale lispro. Adjust based on glycemic requirements.  Check A1C
Will hold oral hypoglycemic agents.  premeal and qhs fingerstick blood sugar checks.    premeal and qhs sliding scale lispro. Adjust based on glycemic requirements.

## 2021-01-25 NOTE — DISCHARGE NOTE PROVIDER - HOSPITAL COURSE
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      Problem/Plan - 1:  ·  Problem: Pseudomonas urinary tract infection.  Plan: Outpatient urine culture grew Pseudomonas aeruginosa  Patient now with allergy to ciprofloxacin  S/p meropenem x1 in the ER, continued on cefepime  1/20 UA , lg LE, +nitrite, few bacteria - culture grew >100k cfu/ml Pseudomonas   Landeros was exchanged this admission, repeat UA/culture as below.   1/21 Repeat UA with , lg LE, +nitrite, no bacteria - cx grew 50-99k Pseudomonas - pansensitive   Blood cultures NGTD x2  Remains afebrile, no leukocytosis.  Continue on cefepime 2g IV Q24h (renally adjusted) - complete today 7d course - end tomorrow 1/26.      Problem/Plan - 2:  ·  Problem: Landeros catheter in place.  Plan: placed for urinary retention, has h/o recurrent UTIs treated with ciprofloxacin but developed rash/hives. Currently draining clear yellow urine.   Urology recommended continue landeros until UTI treated, f/u for TOV as outpatient.

## 2021-01-25 NOTE — PROGRESS NOTE ADULT - SUBJECTIVE AND OBJECTIVE BOX
Infectious Diseases progress note:    Subjective: Covering [facility].  Afebrile, no leukocytosis    ROS:  CONSTITUTIONAL:  No fever, chills, rigors  CARDIOVASCULAR:  No chest pain or palpitations  RESPIRATORY:   No SOB, cough, dyspnea on exertion.  No wheezing  GASTROINTESTINAL:  No abd pain, N/V, diarrhea/constipation  EXTREMITIES:  No swelling or joint pain  GENITOURINARY:  No burning on urination, increased frequency or urgency.  No flank pain  NEUROLOGIC:  No HA, visual disturbances  SKIN: No rashes    Allergies    Cipro (Hives)  fish (Unknown)    Intolerances    Pollen, hayfever (Unknown)      ANTIBIOTICS/RELEVANT:  antimicrobials  cefepime   IVPB 2000 milliGRAM(s) IV Intermittent every 24 hours    immunologic:    OTHER:  aMIOdarone    Tablet 200 milliGRAM(s) Oral daily  apixaban 2.5 milliGRAM(s) Oral two times a day  aspirin enteric coated 81 milliGRAM(s) Oral daily  cholecalciferol 2000 Unit(s) Oral daily  dextrose 40% Gel 15 Gram(s) Oral once  dextrose 5%. 1000 milliLiter(s) IV Continuous <Continuous>  dextrose 5%. 1000 milliLiter(s) IV Continuous <Continuous>  dextrose 50% Injectable 25 Gram(s) IV Push once  dextrose 50% Injectable 12.5 Gram(s) IV Push once  dextrose 50% Injectable 25 Gram(s) IV Push once  escitalopram 15 milliGRAM(s) Oral daily  finasteride 5 milliGRAM(s) Oral daily  glucagon  Injectable 1 milliGRAM(s) IntraMuscular once  insulin lispro (ADMELOG) corrective regimen sliding scale   SubCutaneous three times a day before meals  insulin lispro (ADMELOG) corrective regimen sliding scale   SubCutaneous at bedtime  latanoprost 0.005% Ophthalmic Solution 1 Drop(s) Both EYES at bedtime  simvastatin 20 milliGRAM(s) Oral at bedtime  sodium chloride 0.9% with potassium chloride 20 mEq/L 1000 milliLiter(s) IV Continuous <Continuous>  tamsulosin 0.4 milliGRAM(s) Oral at bedtime      Objective:  Vital Signs Last 24 Hrs  T(C): 36.4 (2021 05:49), Max: 36.4 (2021 20:56)  T(F): 97.6 (2021 05:49), Max: 97.6 (2021 20:56)  HR: 58 (2021 06:09) (55 - 64)  BP: 141/81 (2021 05:49) (141/81 - 164/76)  BP(mean): --  RR: 18 (2021 05:49) (18 - 18)  SpO2: 96% (2021 05:49) (94% - 96%)    PHYSICAL EXAM:  Constitutional:NAD  Eyes:SAPNA, EOMI  Ear/Nose/Throat: no thrush, mucositis.  Moist mucous membranes	  Neck:no JVD, no lymphadenopathy, supple  Respiratory: CTA alyssa  Cardiovascular: S1S2 RRR, no murmurs  Gastrointestinal:soft, nontender,  nondistended (+) BS  Extremities:no e/e/c  Skin:  no rashes, open wounds or ulcerations        LABS:                        12.7   8.57  )-----------( 217      ( 2021 06:58 )             38.4         135  |  103  |  30<H>  ----------------------------<  114<H>  4.1   |  22  |  1.92<H>    Ca    8.7      2021 06:58    TPro  6.3  /  Alb  3.1<L>  /  TBili  0.5  /  DBili  x   /  AST  46<H>  /  ALT  68<H>  /  AlkPhos  69        Urinalysis Basic - ( 2021 19:31 )    Color: Yellow / Appearance: Clear / S.020 / pH: x  Gluc: x / Ketone: Negative  / Bili: Negative / Urobili: Negative   Blood: x / Protein: Trace / Nitrite: Positive   Leuk Esterase: Large / RBC: 1 /hpf /  /HPF   Sq Epi: x / Non Sq Epi: 0 /hpf / Bacteria: Negative                          MICROBIOLOGY:    Culture - Urine (21 @ 23:14)   Specimen Source: .Urine Catheterized   Culture Results:   50,000 - 99,000 CFU/mL Pseudomonas aeruginosa       Culture - Urine (21 @ 23:23)   - Amikacin: S <=16   - Aztreonam: S <=4   - Cefepime: S <=2   - Ceftazidime: S <=1   - Ciprofloxacin: S <=0.25   - Gentamicin: S <=2   - Imipenem: S <=1   - Levofloxacin: S <=0.5   - Meropenem: S <=1   - Piperacillin/Tazobactam: S <=8   - Tobramycin: S <=2   Specimen Source: .Urine Catheterized   Culture Results:   >100,000 CFU/ml Pseudomonas aeruginosa   Organism Identification: Pseudomonas aeruginosa   Organism: Pseudomonas aeruginosa   Method Type: GERALD     RADIOLOGY & ADDITIONAL STUDIES:    < from: US Abdomen Complete (US Abdomen Complete .) (21 @ 10:00) >  IMPRESSION:    Hepatic steatosis.    Cholelithiasis.    Bilateral renal cortical echogenicity, may be seen in the setting of medical renal disease.    A right pleural effusion.    < end of copied text >  
[facility], Division of Infectious Diseases  YOCASTA Pedraza Y. Patel, S. Shah  [phone]  ([phone] - weekdays after 5pm and weekends)    Name: BRIA COREY  Age/Gender: 89y Male  MRN: [id number]    Interval History:  Patient seen earlier this morning, confused but states he feels well.  No new complaints, denies any fever, pain, n/v, diarrhea.  Notes reviewed. Afebrile     Allergies: Cipro (Hives)  fish (Unknown)    Objective:  Vitals:   T(F): 97.7 (01-25-21 @ 13:46), Max: 98.9 (01-24-21 @ 21:27)  HR: 59 (01-25-21 @ 13:46) (59 - 77)  BP: 144/64 (01-25-21 @ 13:46) (143/74 - 145/73)  RR: 18 (01-25-21 @ 13:46) (17 - 18)  SpO2: 96% (01-25-21 @ 13:46) (96% - 97%)  Physical Examination:  General: no acute distress, nontoxic appearing  HEENT: NC/AT, EOMI, anicteric, neck supple  Cardio: S1, S2 heard, RRR, no murmurs  Resp: CTA bilaterally, no rales/wheezes/rhonchi  Abd: soft, NT, ND, + BS  Neuro: awake, alert, confused, follows commands  Ext: no edema or cyanosis, moving extremities  Skin: warm, dry, no visible rash  Psych: appropriate affect and mood for situation  Lines: PIV looks ok  Cortes: clear yellow urine in collection bag     Laboratory Studies:  CBC:                       12.9   8.65  )-----------( 252      ( 25 Jan 2021 07:39 )             37.5     CMP: 01-25    136  |  101  |  30<H>  ----------------------------<  92  3.6   |  23  |  1.61<H>    Ca    8.5      25 Jan 2021 07:39    TPro  6.3  /  Alb  3.1<L>  /  TBili  0.5  /  DBili  x   /  AST  70<H>  /  ALT  86<H>  /  AlkPhos  70  01-25    LIVER FUNCTIONS - ( 25 Jan 2021 07:39 )  Alb: 3.1 g/dL / Pro: 6.3 g/dL / ALK PHOS: 70 U/L / ALT: 86 U/L / AST: 70 U/L / GGT: x           Microbiology:  1/21 - blood cultures - NGTD x2  1/20 - urine culture catheterized 50-99k CFU/ml Pseudomonas aeruginosa (pansensitive)  1/21 - COVID-19 Ab - Positive (6.60)  1/20 - urine culture catheterized >100,000 CFU/ml Pseudomonas aeruginosa (pansensitive)  1/20 - COVID-19 PCR - negative    Radiology--  US Abdomen Complete (US Abdomen Complete .) (01.21.21 @ 10:00) >IMPRESSION: Hepatic steatosis. Cholelithiasis. Bilateral renal cortical echogenicity, may be seen in the setting of medical renal disease. A right pleural effusion.    Medications:  MEDICATIONS  (STANDING):  aMIOdarone    Tablet 200 milliGRAM(s) Oral daily  apixaban 2.5 milliGRAM(s) Oral two times a day  aspirin enteric coated 81 milliGRAM(s) Oral daily  cefepime   IVPB 2000 milliGRAM(s) IV Intermittent every 24 hours  cholecalciferol 2000 Unit(s) Oral daily  dextrose 40% Gel 15 Gram(s) Oral once  dextrose 5%. 1000 milliLiter(s) (100 mL/Hr) IV Continuous <Continuous>  dextrose 5%. 1000 milliLiter(s) (50 mL/Hr) IV Continuous <Continuous>  dextrose 50% Injectable 25 Gram(s) IV Push once  dextrose 50% Injectable 12.5 Gram(s) IV Push once  dextrose 50% Injectable 25 Gram(s) IV Push once  escitalopram 15 milliGRAM(s) Oral daily  finasteride 5 milliGRAM(s) Oral daily  glucagon  Injectable 1 milliGRAM(s) IntraMuscular once  insulin lispro (ADMELOG) corrective regimen sliding scale   SubCutaneous three times a day before meals  insulin lispro (ADMELOG) corrective regimen sliding scale   SubCutaneous at bedtime  latanoprost 0.005% Ophthalmic Solution 1 Drop(s) Both EYES at bedtime  simvastatin 20 milliGRAM(s) Oral at bedtime  sodium chloride 0.9% with potassium chloride 20 mEq/L 1000 milliLiter(s) (50 mL/Hr) IV Continuous <Continuous>  tamsulosin 0.4 milliGRAM(s) Oral at bedtime\    Antimicrobials:  cefepime   IVPB 2000 milliGRAM(s) IV Intermittent every 24 hours - started 1/20  s/p meropenem in ER  
[facility], Division of Infectious Diseases  YOCASTA Pedraza Y. Patel, S. Shah  [phone]  ([phone] - weekdays after 5pm and weekends)    Name: BRIA COREY  Age/Gender: 89y Male  MRN: [id number]    Interval History:  Patient sitting in madera outside his room, RN helping with lunch.   Patient confused, says he feels fine, denies any fever, pain, n/v, diarrhea.  Notes reviewed. Afebrile     Allergies: Cipro (Hives)  fish (Unknown)    Objective:  Vitals:   T(F): 97.4 (21 @ 14:23), Max: 98.8 (21 @ 04:17)  HR: 64 (21 @ 14:23) (56 - 64)  BP: 145/69 (21 @ 14:23) (139/66 - 175/80)  RR: 18 (21 @ 14:23) (18 - 18)  SpO2: 94% (21 @ 14:23) (94% - 98%)  Physical Examination:  General: no acute distress  HEENT: NC/AT, EOMI, anicteric, neck supple  Cardio: S1, S2 heard, RRR, no murmurs  Resp: CTA bilaterally, no rales/wheezes/rhonchi  Abd: soft, NT, ND, + BS  Neuro: awake, alert, confused, follows commands  Ext: no edema or cyanosis, moving extremities  Skin: warm, dry, no visible rash  Psych: appropriate affect and mood for situation  Lines: PIV looks ok  Cortes: clear yellow urine in collection bag     Laboratory Studies:  CBC:                       12.5   9.06  )-----------( 224      ( 2021 06:48 )             37.0     CMP:     137  |  102  |  24<H>  ----------------------------<  95  3.9   |  21<L>  |  1.61<H>    Ca    8.7      2021 06:46    TPro  6.3  /  Alb  3.2<L>  /  TBili  0.5  /  DBili  x   /  AST  47<H>  /  ALT  71<H>  /  AlkPhos  70      LIVER FUNCTIONS - ( 2021 06:46 )  Alb: 3.2 g/dL / Pro: 6.3 g/dL / ALK PHOS: 70 U/L / ALT: 71 U/L / AST: 47 U/L / GGT: x           Urinalysis Basic - ( 2021 19:31 )  Color: Yellow / Appearance: Clear / S.020 / pH: x  Gluc: x / Ketone: Negative  / Bili: Negative / Urobili: Negative   Blood: x / Protein: Trace / Nitrite: Positive   Leuk Esterase: Large / RBC: 1 /hpf /  /HPF   Sq Epi: x / Non Sq Epi: 0 /hpf / Bacteria: Negative    Microbiology:   - blood cultures - NGTD x2   - urine culture catheterized >100,000 CFU/ml Pseudomonas aeruginosa   - COVID-19 Ab - Positive (6.60)   - COVID-19 PCR - negative    Radiology--  US Abdomen Complete (US Abdomen Complete .) (21 @ 10:00) >IMPRESSION: Hepatic steatosis. Cholelithiasis. Bilateral renal cortical echogenicity, may be seen in the setting of medical renal disease. A right pleural effusion.    Medications:  aMIOdarone    Tablet 200 milliGRAM(s) Oral daily  apixaban 2.5 milliGRAM(s) Oral two times a day  aspirin enteric coated 81 milliGRAM(s) Oral daily  cefepime   IVPB 2000 milliGRAM(s) IV Intermittent every 24 hours  cholecalciferol 2000 Unit(s) Oral daily  dextrose 40% Gel 15 Gram(s) Oral once  dextrose 5%. 1000 milliLiter(s) IV Continuous <Continuous>  dextrose 5%. 1000 milliLiter(s) IV Continuous <Continuous>  dextrose 50% Injectable 25 Gram(s) IV Push once  dextrose 50% Injectable 12.5 Gram(s) IV Push once  dextrose 50% Injectable 25 Gram(s) IV Push once  escitalopram 15 milliGRAM(s) Oral daily  finasteride 5 milliGRAM(s) Oral daily  glucagon  Injectable 1 milliGRAM(s) IntraMuscular once  insulin lispro (ADMELOG) corrective regimen sliding scale   SubCutaneous three times a day before meals  insulin lispro (ADMELOG) corrective regimen sliding scale   SubCutaneous at bedtime  latanoprost 0.005% Ophthalmic Solution 1 Drop(s) Both EYES at bedtime  simvastatin 20 milliGRAM(s) Oral at bedtime  sodium chloride 0.9% with potassium chloride 20 mEq/L 1000 milliLiter(s) IV Continuous <Continuous>  tamsulosin 0.4 milliGRAM(s) Oral at bedtime    Antimicrobials:  cefepime   IVPB 2000 milliGRAM(s) IV Intermittent every 24 hours - started   s/p meropenem in ER  
Patient is a 89y old  Male who presents with a chief complaint of UTI (23 Jan 2021 11:12)      SUBJECTIVE / OVERNIGHT EVENTS:    Patient seen and examined. poor historian 2/2 dementia. no acute events.      Vital Signs Last 24 Hrs  T(C): 36.3 (24 Jan 2021 05:48), Max: 36.6 (23 Jan 2021 11:52)  T(F): 97.3 (24 Jan 2021 05:48), Max: 97.9 (23 Jan 2021 11:52)  HR: 57 (24 Jan 2021 05:48) (55 - 59)  BP: 167/78 (24 Jan 2021 05:48) (123/72 - 167/78)  BP(mean): --  RR: 18 (24 Jan 2021 05:48) (18 - 18)  SpO2: 97% (24 Jan 2021 05:48) (95% - 97%)  I&O's Summary    23 Jan 2021 07:01  -  24 Jan 2021 07:00  --------------------------------------------------------  IN: 650 mL / OUT: 1400 mL / NET: -750 mL    24 Jan 2021 07:01  -  24 Jan 2021 10:05  --------------------------------------------------------  IN: 120 mL / OUT: 0 mL / NET: 120 mL        PE:  GENERAL: NAD, AAOx1, obese  HEAD:  Atraumatic, Normocephalic  CHEST/LUNG: CTABL, No wheeze  HEART: Regular rate and rhythm; no murmur  ABDOMEN: Soft, Nontender, Nondistended; Bowel sounds present  : landeros  EXTREMITIES:  2+ Peripheral Pulses, No clubbing, cyanosis, or edema  SKIN: No rashes or lesions  NEURO: No focal deficits    LABS:                        12.9   8.82  )-----------( 248      ( 24 Jan 2021 07:19 )             38.1     01-24    135  |  101  |  30<H>  ----------------------------<  99  4.1   |  21<L>  |  1.70<H>    Ca    8.8      24 Jan 2021 07:19    TPro  6.3  /  Alb  3.1<L>  /  TBili  0.5  /  DBili  x   /  AST  46<H>  /  ALT  68<H>  /  AlkPhos  69  01-23      CAPILLARY BLOOD GLUCOSE      POCT Blood Glucose.: 128 mg/dL (24 Jan 2021 08:23)  POCT Blood Glucose.: 125 mg/dL (23 Jan 2021 22:23)  POCT Blood Glucose.: 123 mg/dL (23 Jan 2021 17:44)  POCT Blood Glucose.: 163 mg/dL (23 Jan 2021 12:10)            RADIOLOGY & ADDITIONAL TESTS:    Imaging Personally Reviewed:  [x] YES  [ ] NO    Consultant(s) Notes Reviewed:  [x] YES  [ ] NO    MEDICATIONS  (STANDING):  aMIOdarone    Tablet 200 milliGRAM(s) Oral daily  apixaban 2.5 milliGRAM(s) Oral two times a day  aspirin enteric coated 81 milliGRAM(s) Oral daily  cefepime   IVPB 2000 milliGRAM(s) IV Intermittent every 24 hours  cholecalciferol 2000 Unit(s) Oral daily  dextrose 40% Gel 15 Gram(s) Oral once  dextrose 5%. 1000 milliLiter(s) (50 mL/Hr) IV Continuous <Continuous>  dextrose 5%. 1000 milliLiter(s) (100 mL/Hr) IV Continuous <Continuous>  dextrose 50% Injectable 25 Gram(s) IV Push once  dextrose 50% Injectable 12.5 Gram(s) IV Push once  dextrose 50% Injectable 25 Gram(s) IV Push once  escitalopram 15 milliGRAM(s) Oral daily  finasteride 5 milliGRAM(s) Oral daily  glucagon  Injectable 1 milliGRAM(s) IntraMuscular once  insulin lispro (ADMELOG) corrective regimen sliding scale   SubCutaneous three times a day before meals  insulin lispro (ADMELOG) corrective regimen sliding scale   SubCutaneous at bedtime  latanoprost 0.005% Ophthalmic Solution 1 Drop(s) Both EYES at bedtime  simvastatin 20 milliGRAM(s) Oral at bedtime  sodium chloride 0.9% with potassium chloride 20 mEq/L 1000 milliLiter(s) (50 mL/Hr) IV Continuous <Continuous>  tamsulosin 0.4 milliGRAM(s) Oral at bedtime    MEDICATIONS  (PRN):      Care Discussed with Consultants/Other Providers [x] YES  [ ] NO    HEALTH ISSUES - PROBLEM Dx:  Landeros catheter in place  Landeros catheter in place    Pseudomonas urinary tract infection  Pseudomonas urinary tract infection    Stage 3b chronic kidney disease  Stage 3b chronic kidney disease    Prophylactic measure  Prophylactic measure    BPH (benign prostatic hyperplasia)  BPH (benign prostatic hyperplasia)    History of ventricular tachycardia  History of ventricular tachycardia    History of CVA (cerebrovascular accident)  History of CVA (cerebrovascular accident)    T2DM (type 2 diabetes mellitus)  T2DM (type 2 diabetes mellitus)    Elevated liver enzymes  Elevated liver enzymes    Acute kidney injury  Acute kidney injury    Recurrent UTI  Recurrent UTI        
Patient is a 89y old  Male who presents with a chief complaint of UTI (2021 11:14)      SUBJECTIVE / OVERNIGHT EVENTS:    Patient seen and examined. pleasantly confused. denies complaints. poor historian.      Vital Signs Last 24 Hrs  T(C): 37.1 (2021 04:17), Max: 37.1 (2021 16:20)  T(F): 98.8 (2021 04:17), Max: 98.8 (2021 04:17)  HR: 63 (2021 04:17) (56 - 63)  BP: 139/66 (2021 04:17) (139/66 - 175/80)  BP(mean): --  RR: 18 (2021 04:17) (18 - 18)  SpO2: 97% (2021 04:17) (96% - 98%)  I&O's Summary    2021 07:01  -  2021 07:00  --------------------------------------------------------  IN: 870 mL / OUT: 500 mL / NET: 370 mL        PE:  GENERAL: NAD, AAOx1, obese  HEAD:  Atraumatic, Normocephalic  CHEST/LUNG: CTABL, No wheeze  HEART: Regular rate and rhythm; no murmur  ABDOMEN: Soft, Nontender, Nondistended; Bowel sounds present  : landeros  EXTREMITIES:  2+ Peripheral Pulses, No clubbing, cyanosis, or edema  SKIN: No rashes or lesions  NEURO: No focal deficits    LABS:                        12.5   9.06  )-----------( 224      ( 2021 06:48 )             37.0     -    137  |  102  |  24<H>  ----------------------------<  95  3.9   |  21<L>  |  1.61<H>    Ca    8.7      2021 06:46    TPro  6.3  /  Alb  3.2<L>  /  TBili  0.5  /  DBili  x   /  AST  47<H>  /  ALT  71<H>  /  AlkPhos  70        CAPILLARY BLOOD GLUCOSE      POCT Blood Glucose.: 150 mg/dL (2021 11:33)  POCT Blood Glucose.: 117 mg/dL (2021 08:29)  POCT Blood Glucose.: 105 mg/dL (2021 21:32)  POCT Blood Glucose.: 116 mg/dL (2021 19:28)  POCT Blood Glucose.: 197 mg/dL (2021 17:35)  POCT Blood Glucose.: 293 mg/dL (2021 13:30)        Urinalysis Basic - ( 2021 19:31 )    Color: Yellow / Appearance: Clear / S.020 / pH: x  Gluc: x / Ketone: Negative  / Bili: Negative / Urobili: Negative   Blood: x / Protein: Trace / Nitrite: Positive   Leuk Esterase: Large / RBC: 1 /hpf /  /HPF   Sq Epi: x / Non Sq Epi: 0 /hpf / Bacteria: Negative        RADIOLOGY & ADDITIONAL TESTS:    Imaging Personally Reviewed:  [x] YES  [ ] NO    Consultant(s) Notes Reviewed:  [x] YES  [ ] NO    MEDICATIONS  (STANDING):  aMIOdarone    Tablet 200 milliGRAM(s) Oral daily  apixaban 2.5 milliGRAM(s) Oral two times a day  aspirin enteric coated 81 milliGRAM(s) Oral daily  cefepime   IVPB 2000 milliGRAM(s) IV Intermittent every 24 hours  cholecalciferol 2000 Unit(s) Oral daily  dextrose 40% Gel 15 Gram(s) Oral once  dextrose 5%. 1000 milliLiter(s) (50 mL/Hr) IV Continuous <Continuous>  dextrose 5%. 1000 milliLiter(s) (100 mL/Hr) IV Continuous <Continuous>  dextrose 50% Injectable 25 Gram(s) IV Push once  dextrose 50% Injectable 12.5 Gram(s) IV Push once  dextrose 50% Injectable 25 Gram(s) IV Push once  escitalopram 15 milliGRAM(s) Oral daily  finasteride 5 milliGRAM(s) Oral daily  glucagon  Injectable 1 milliGRAM(s) IntraMuscular once  insulin lispro (ADMELOG) corrective regimen sliding scale   SubCutaneous three times a day before meals  insulin lispro (ADMELOG) corrective regimen sliding scale   SubCutaneous at bedtime  latanoprost 0.005% Ophthalmic Solution 1 Drop(s) Both EYES at bedtime  simvastatin 20 milliGRAM(s) Oral at bedtime  sodium chloride 0.9% with potassium chloride 20 mEq/L 1000 milliLiter(s) (50 mL/Hr) IV Continuous <Continuous>  tamsulosin 0.4 milliGRAM(s) Oral at bedtime    MEDICATIONS  (PRN):      Care Discussed with Consultants/Other Providers [x] YES  [ ] NO    HEALTH ISSUES - PROBLEM Dx:  Landeros catheter in place  Landeros catheter in place    Pseudomonas urinary tract infection  Pseudomonas urinary tract infection    Stage 3b chronic kidney disease  Stage 3b chronic kidney disease    Prophylactic measure  Prophylactic measure    BPH (benign prostatic hyperplasia)  BPH (benign prostatic hyperplasia)    History of ventricular tachycardia  History of ventricular tachycardia    History of CVA (cerebrovascular accident)  History of CVA (cerebrovascular accident)    T2DM (type 2 diabetes mellitus)  T2DM (type 2 diabetes mellitus)    Elevated liver enzymes  Elevated liver enzymes    Acute kidney injury  Acute kidney injury    Recurrent UTI  Recurrent UTI        
Patient is a 89y old  Male who presents with a chief complaint of UTI (2021 23:04)      SUBJECTIVE / OVERNIGHT EVENTS:    Patient seen and examined. pleasantly confused. states he is at someone's house. denies dysuria. denies cp sob.      Vital Signs Last 24 Hrs  T(C): 36.5 (2021 08:43), Max: 36.9 (2021 02:34)  T(F): 97.7 (2021 08:43), Max: 98.4 (2021 02:34)  HR: 73 (2021 08:43) (50 - 73)  BP: 148/77 (2021 08:43) (124/83 - 176/69)  BP(mean): 88 (2021 02:34) (88 - 98)  RR: 19 (2021 08:43) (16 - 19)  SpO2: 98% (2021 08:43) (95% - 98%)  I&O's Summary    2021 07:01  -  2021 07:00  --------------------------------------------------------  IN: 0 mL / OUT: 800 mL / NET: -800 mL        PE:  GENERAL: NAD, AAOx1, obese  HEAD:  Atraumatic, Normocephalic  CHEST/LUNG: CTABL, No wheeze  HEART: Regular rate and rhythm; no murmur  ABDOMEN: Soft, Nontender, Nondistended; Bowel sounds present  : landeros  EXTREMITIES:  2+ Peripheral Pulses, No clubbing, cyanosis, or edema  SKIN: No rashes or lesions  NEURO: No focal deficits    LABS:                        14.2   9.57  )-----------( 236      ( 2021 18:13 )             42.5     01-20    133<L>  |  100  |  28<H>  ----------------------------<  132<H>  4.4   |  23  |  1.82<H>    Ca    9.5      2021 18:13    TPro  7.4  /  Alb  3.6  /  TBili  0.4  /  DBili  x   /  AST  54<H>  /  ALT  87<H>  /  AlkPhos  91        CAPILLARY BLOOD GLUCOSE      POCT Blood Glucose.: 127 mg/dL (2021 10:26)        Urinalysis Basic - ( 2021 19:04 )    Color: Yellow / Appearance: Slightly Turbid / S.021 / pH: x  Gluc: x / Ketone: Negative  / Bili: Negative / Urobili: Negative   Blood: x / Protein: Trace / Nitrite: Positive   Leuk Esterase: Large / RBC: 1 /hpf /  /HPF   Sq Epi: x / Non Sq Epi: 0 /hpf / Bacteria: Few        RADIOLOGY & ADDITIONAL TESTS:    Imaging Personally Reviewed:  [x] YES  [ ] NO    Consultant(s) Notes Reviewed:  [x] YES  [ ] NO    MEDICATIONS  (STANDING):  aMIOdarone    Tablet 200 milliGRAM(s) Oral daily  apixaban 2.5 milliGRAM(s) Oral two times a day  aspirin enteric coated 81 milliGRAM(s) Oral daily  cefepime   IVPB 2000 milliGRAM(s) IV Intermittent every 24 hours  cholecalciferol 2000 Unit(s) Oral daily  dextrose 40% Gel 15 Gram(s) Oral once  dextrose 5%. 1000 milliLiter(s) (50 mL/Hr) IV Continuous <Continuous>  dextrose 5%. 1000 milliLiter(s) (100 mL/Hr) IV Continuous <Continuous>  dextrose 50% Injectable 25 Gram(s) IV Push once  dextrose 50% Injectable 12.5 Gram(s) IV Push once  dextrose 50% Injectable 25 Gram(s) IV Push once  escitalopram 15 milliGRAM(s) Oral daily  finasteride 5 milliGRAM(s) Oral daily  glucagon  Injectable 1 milliGRAM(s) IntraMuscular once  insulin lispro (ADMELOG) corrective regimen sliding scale   SubCutaneous three times a day before meals  insulin lispro (ADMELOG) corrective regimen sliding scale   SubCutaneous at bedtime  latanoprost 0.005% Ophthalmic Solution 1 Drop(s) Both EYES at bedtime  simvastatin 20 milliGRAM(s) Oral at bedtime  sodium chloride 0.9% with potassium chloride 20 mEq/L 1000 milliLiter(s) (50 mL/Hr) IV Continuous <Continuous>  tamsulosin 0.4 milliGRAM(s) Oral at bedtime    MEDICATIONS  (PRN):      Care Discussed with Consultants/Other Providers [x] YES  [ ] NO    HEALTH ISSUES - PROBLEM Dx:  Stage 3b chronic kidney disease  Stage 3b chronic kidney disease    Prophylactic measure  Prophylactic measure    BPH (benign prostatic hyperplasia)  BPH (benign prostatic hyperplasia)    History of ventricular tachycardia  History of ventricular tachycardia    History of CVA (cerebrovascular accident)  History of CVA (cerebrovascular accident)    T2DM (type 2 diabetes mellitus)  T2DM (type 2 diabetes mellitus)    Elevated liver enzymes  Elevated liver enzymes    Acute kidney injury  Acute kidney injury    Recurrent UTI  Recurrent UTI        
Patient is a 89y old  Male who presents with a chief complaint of UTI (2021 20:50)      SUBJECTIVE / OVERNIGHT EVENTS:    Patient seen and examined.   pleasantly confused.     Vital Signs Last 24 Hrs  T(C): 36.4 (2021 05:49), Max: 36.4 (2021 20:56)  T(F): 97.6 (2021 05:49), Max: 97.6 (2021 20:56)  HR: 58 (2021 06:09) (55 - 64)  BP: 141/81 (2021 05:49) (141/81 - 164/76)  BP(mean): --  RR: 18 (2021 05:49) (18 - 18)  SpO2: 96% (2021 05:49) (94% - 96%)  I&O's Summary    2021 07:01  -  2021 07:00  --------------------------------------------------------  IN: 470 mL / OUT: 1100 mL / NET: -630 mL    2021 07:01  -  2021 09:41  --------------------------------------------------------  IN: 240 mL / OUT: 100 mL / NET: 140 mL        PE:  GENERAL: NAD, AAOx1, obese  HEAD:  Atraumatic, Normocephalic  CHEST/LUNG: CTABL, No wheeze  HEART: Regular rate and rhythm; no murmur  ABDOMEN: Soft, Nontender, Nondistended; Bowel sounds present  : landeros  EXTREMITIES:  2+ Peripheral Pulses, No clubbing, cyanosis, or edema  SKIN: No rashes or lesions  NEURO: No focal deficits    LABS:                        12.7   8.57  )-----------( 217      ( 2021 06:58 )             38.4         135  |  103  |  30<H>  ----------------------------<  114<H>  4.1   |  22  |  1.92<H>    Ca    8.7      2021 06:58    TPro  6.3  /  Alb  3.1<L>  /  TBili  0.5  /  DBili  x   /  AST  46<H>  /  ALT  68<H>  /  AlkPhos  69        CAPILLARY BLOOD GLUCOSE      POCT Blood Glucose.: 112 mg/dL (2021 08:43)  POCT Blood Glucose.: 152 mg/dL (2021 21:36)  POCT Blood Glucose.: 128 mg/dL (2021 17:09)  POCT Blood Glucose.: 150 mg/dL (2021 11:33)        Urinalysis Basic - ( 2021 19:31 )    Color: Yellow / Appearance: Clear / S.020 / pH: x  Gluc: x / Ketone: Negative  / Bili: Negative / Urobili: Negative   Blood: x / Protein: Trace / Nitrite: Positive   Leuk Esterase: Large / RBC: 1 /hpf /  /HPF   Sq Epi: x / Non Sq Epi: 0 /hpf / Bacteria: Negative        RADIOLOGY & ADDITIONAL TESTS:    Imaging Personally Reviewed:  [x] YES  [ ] NO    Consultant(s) Notes Reviewed:  [x] YES  [ ] NO    MEDICATIONS  (STANDING):  aMIOdarone    Tablet 200 milliGRAM(s) Oral daily  apixaban 2.5 milliGRAM(s) Oral two times a day  aspirin enteric coated 81 milliGRAM(s) Oral daily  cefepime   IVPB 2000 milliGRAM(s) IV Intermittent every 24 hours  cholecalciferol 2000 Unit(s) Oral daily  dextrose 40% Gel 15 Gram(s) Oral once  dextrose 5%. 1000 milliLiter(s) (50 mL/Hr) IV Continuous <Continuous>  dextrose 5%. 1000 milliLiter(s) (100 mL/Hr) IV Continuous <Continuous>  dextrose 50% Injectable 25 Gram(s) IV Push once  dextrose 50% Injectable 12.5 Gram(s) IV Push once  dextrose 50% Injectable 25 Gram(s) IV Push once  escitalopram 15 milliGRAM(s) Oral daily  finasteride 5 milliGRAM(s) Oral daily  glucagon  Injectable 1 milliGRAM(s) IntraMuscular once  insulin lispro (ADMELOG) corrective regimen sliding scale   SubCutaneous three times a day before meals  insulin lispro (ADMELOG) corrective regimen sliding scale   SubCutaneous at bedtime  latanoprost 0.005% Ophthalmic Solution 1 Drop(s) Both EYES at bedtime  simvastatin 20 milliGRAM(s) Oral at bedtime  sodium chloride 0.9% with potassium chloride 20 mEq/L 1000 milliLiter(s) (50 mL/Hr) IV Continuous <Continuous>  tamsulosin 0.4 milliGRAM(s) Oral at bedtime    MEDICATIONS  (PRN):      Care Discussed with Consultants/Other Providers [x] YES  [ ] NO    HEALTH ISSUES - PROBLEM Dx:  Landeros catheter in place  Landeros catheter in place    Pseudomonas urinary tract infection  Pseudomonas urinary tract infection    Stage 3b chronic kidney disease  Stage 3b chronic kidney disease    Prophylactic measure  Prophylactic measure    BPH (benign prostatic hyperplasia)  BPH (benign prostatic hyperplasia)    History of ventricular tachycardia  History of ventricular tachycardia    History of CVA (cerebrovascular accident)  History of CVA (cerebrovascular accident)    T2DM (type 2 diabetes mellitus)  T2DM (type 2 diabetes mellitus)    Elevated liver enzymes  Elevated liver enzymes    Acute kidney injury  Acute kidney injury    Recurrent UTI  Recurrent UTI        
Patient is a 89y old  Male who presents with a chief complaint of UTI (24 Jan 2021 10:05)      SUBJECTIVE / OVERNIGHT EVENTS:    Patient seen and examined.   no acute events. calm and cannot provide ROS 2/2 dementia.    Vital Signs Last 24 Hrs  T(C): 36.7 (25 Jan 2021 04:14), Max: 37.2 (24 Jan 2021 21:27)  T(F): 98 (25 Jan 2021 04:14), Max: 98.9 (24 Jan 2021 21:27)  HR: 68 (25 Jan 2021 04:14) (68 - 77)  BP: 143/74 (25 Jan 2021 04:14) (143/74 - 145/73)  BP(mean): --  RR: 17 (25 Jan 2021 04:14) (17 - 18)  SpO2: 96% (25 Jan 2021 04:14) (96% - 97%)  I&O's Summary    24 Jan 2021 07:01  -  25 Jan 2021 07:00  --------------------------------------------------------  IN: 1200 mL / OUT: 1350 mL / NET: -150 mL        PE:  GENERAL: NAD, AAOx1, obese  HEAD:  Atraumatic, Normocephalic  CHEST/LUNG: CTABL, No wheeze  HEART: Regular rate and rhythm; no murmur  ABDOMEN: Soft, Nontender, Nondistended; Bowel sounds present  : landeros  EXTREMITIES:  2+ Peripheral Pulses, No clubbing, cyanosis, or edema  SKIN: No rashes or lesions  NEURO: No focal deficits    LABS:                        12.9   8.65  )-----------( 252      ( 25 Jan 2021 07:39 )             37.5     01-25    136  |  101  |  30<H>  ----------------------------<  92  3.6   |  23  |  1.61<H>    Ca    8.5      25 Jan 2021 07:39    TPro  6.3  /  Alb  3.1<L>  /  TBili  0.5  /  DBili  x   /  AST  70<H>  /  ALT  86<H>  /  AlkPhos  70  01-25      CAPILLARY BLOOD GLUCOSE      POCT Blood Glucose.: 116 mg/dL (25 Jan 2021 08:23)  POCT Blood Glucose.: 150 mg/dL (24 Jan 2021 22:14)  POCT Blood Glucose.: 118 mg/dL (24 Jan 2021 17:00)  POCT Blood Glucose.: 163 mg/dL (24 Jan 2021 12:18)            RADIOLOGY & ADDITIONAL TESTS:    Imaging Personally Reviewed:  [x] YES  [ ] NO    Consultant(s) Notes Reviewed:  [x] YES  [ ] NO    MEDICATIONS  (STANDING):  aMIOdarone    Tablet 200 milliGRAM(s) Oral daily  apixaban 2.5 milliGRAM(s) Oral two times a day  aspirin enteric coated 81 milliGRAM(s) Oral daily  cefepime   IVPB 2000 milliGRAM(s) IV Intermittent every 24 hours  cholecalciferol 2000 Unit(s) Oral daily  dextrose 40% Gel 15 Gram(s) Oral once  dextrose 5%. 1000 milliLiter(s) (50 mL/Hr) IV Continuous <Continuous>  dextrose 5%. 1000 milliLiter(s) (100 mL/Hr) IV Continuous <Continuous>  dextrose 50% Injectable 25 Gram(s) IV Push once  dextrose 50% Injectable 12.5 Gram(s) IV Push once  dextrose 50% Injectable 25 Gram(s) IV Push once  escitalopram 15 milliGRAM(s) Oral daily  finasteride 5 milliGRAM(s) Oral daily  glucagon  Injectable 1 milliGRAM(s) IntraMuscular once  insulin lispro (ADMELOG) corrective regimen sliding scale   SubCutaneous three times a day before meals  insulin lispro (ADMELOG) corrective regimen sliding scale   SubCutaneous at bedtime  latanoprost 0.005% Ophthalmic Solution 1 Drop(s) Both EYES at bedtime  simvastatin 20 milliGRAM(s) Oral at bedtime  sodium chloride 0.9% with potassium chloride 20 mEq/L 1000 milliLiter(s) (50 mL/Hr) IV Continuous <Continuous>  tamsulosin 0.4 milliGRAM(s) Oral at bedtime    MEDICATIONS  (PRN):      Care Discussed with Consultants/Other Providers [x] YES  [ ] NO    HEALTH ISSUES - PROBLEM Dx:  Landeros catheter in place  Landeros catheter in place    Pseudomonas urinary tract infection  Pseudomonas urinary tract infection    Stage 3b chronic kidney disease  Stage 3b chronic kidney disease    Prophylactic measure  Prophylactic measure    BPH (benign prostatic hyperplasia)  BPH (benign prostatic hyperplasia)    History of ventricular tachycardia  History of ventricular tachycardia    History of CVA (cerebrovascular accident)  History of CVA (cerebrovascular accident)    T2DM (type 2 diabetes mellitus)  T2DM (type 2 diabetes mellitus)    Elevated liver enzymes  Elevated liver enzymes    Acute kidney injury  Acute kidney injury    Recurrent UTI  Recurrent UTI

## 2021-01-25 NOTE — PROGRESS NOTE ADULT - PROBLEM SELECTOR PLAN 5
Continue home dose of aspirin 81mg PO qd and statin  hold statin if LFTs uptrending  lexapro 15mg PO qdaily.
Continue home dose of aspirin 81mg PO qd and statin  hold statin if LFTs uptrending  Start dose of lexapro 15mg PO qdaily.
Continue home dose of aspirin 81mg PO qd and statin  hold statin if LFTs uptrending  Start dose of lexapro 15mg PO qdaily.

## 2021-01-25 NOTE — PROGRESS NOTE ADULT - PROBLEM SELECTOR PLAN 8
VTE ppx: on eliquis  Activity: OOB with assistance, fall precautions  Diet: DASH, consistent carbohydrate
VTE ppx: on eliquis  Activity: OOB with assistance, fall precautions  Diet: DASH, consistent carbohydrate
VTE ppx: on eliquis  Activity: OOB with assistance, fall precautions  Diet: DASH, consistent carbohydrate    Start vitamin D supplement
VTE ppx: on eliquis  Activity: OOB with assistance, fall precautions  Diet: DASH, consistent carbohydrate
VTE ppx: on eliquis  Activity: OOB with assistance, fall precautions  Diet: DASH, consistent carbohydrate

## 2021-01-25 NOTE — PROGRESS NOTE ADULT - PROBLEM SELECTOR PLAN 3
Liver enzymes are mildly elevated, rising since July 2020.  Unclear if patient has had previous workup. Obtain hepatic US
Liver enzymes are mildly elevated, rising since July 2020.  hepatic US hepatic steatosis
Blood glucose management per primary team.
Liver enzymes are mildly elevated, rising since July 2020.  hepatic US hepatic steatosis
Blood glucose management per primary team.

## 2021-01-25 NOTE — PROGRESS NOTE ADULT - PROBLEM SELECTOR PROBLEM 6
History of ventricular tachycardia

## 2021-01-25 NOTE — PROGRESS NOTE ADULT - PROBLEM SELECTOR PLAN 1
landeros exchanged  urine culture pseudomonas aeruginosa  BC NTD  cont cefepime, given allergy to cipro, will have to cont IV cefepime 7 day course, last day tomorrow  appreciate ID recs  appreciate urology consult, recommend to cont landeros until UTI treated and to fu for TOV

## 2021-01-25 NOTE — PROGRESS NOTE ADULT - PROBLEM SELECTOR PLAN 2
stable  Trend SCr  cont landeros
placed for urinary retention, has h/o recurrent UTIs treated with ciprofloxacin but developed rash/hives. Currently draining clear yellow urine. Urology consult for possible TOV.
stable  Trend SCr  cont landeros
stable  Trend SCr  cont landeros
placed for urinary retention, has h/o recurrent UTIs treated with ciprofloxacin but developed rash/hives. Currently draining clear yellow urine.   Urology recommended continue landeros until UTI treated, f/u for TOV as outpatient

## 2021-01-25 NOTE — DISCHARGE NOTE PROVIDER - NSDCCPCAREPLAN_GEN_ALL_CORE_FT
PRINCIPAL DISCHARGE DIAGNOSIS  Diagnosis: Pseudomonas urinary tract infection  Assessment and Plan of Treatment: You completed antibiotics. follow up with PCP and  within 1 week  landeros cath care  return to the hospital if worsens      SECONDARY DISCHARGE DIAGNOSES  Diagnosis: Landeros catheter in place  Assessment and Plan of Treatment: urinary retention. landeros cath changed 1/20 follow up with pcp and     Diagnosis: BPH (benign prostatic hyperplasia)  Assessment and Plan of Treatment: plan as above    Diagnosis: Hyponatremia  Assessment and Plan of Treatment: resolved    Diagnosis: Stage 3b chronic kidney disease  Assessment and Plan of Treatment: Stage 3b chronic kidney disease    Diagnosis: Elevated liver enzymes  Assessment and Plan of Treatment: improved. follow up with PCP in 1 week    Diagnosis: T2DM (type 2 diabetes mellitus)  Assessment and Plan of Treatment: HA1C 6.4     PRINCIPAL DISCHARGE DIAGNOSIS  Diagnosis: Pseudomonas urinary tract infection  Assessment and Plan of Treatment: You completed antibiotics. follow up with PCP and  within 1 week  landeros cath care  return to the hospital if worsens      SECONDARY DISCHARGE DIAGNOSES  Diagnosis: Hyponatremia  Assessment and Plan of Treatment: resolved    Diagnosis: Elevated liver enzymes  Assessment and Plan of Treatment: improved. follow up with PCP in 1 week    Diagnosis: T2DM (type 2 diabetes mellitus)  Assessment and Plan of Treatment: HA1C 6.4    Diagnosis: Stage 3b chronic kidney disease  Assessment and Plan of Treatment: Avoid taking (NSAIDs) - (ex: Ibuprofen, Advil, Celebrex, Naprosyn)  Avoid taking any nephrotoxic agents (can harm kidneys) - Intravenous contrast for diagnostic testing, combination cold medications.  Have all medications adjusted for your renal function by your Health Care Provider.  Blood pressure control is important.  Take all medication as prescribed.    Diagnosis: BPH (benign prostatic hyperplasia)  Assessment and Plan of Treatment: You have an enlarged prostate gland which gets bigger as men get older - it is a very common problem and has nothing to do with prostate cancer  Call your doctor if you are urinating more frequently, have trouble starting to urinate, have weak stream, urine leaking or dribbling, and feeling as though bladder is not empty after urination  Your doctor will monitor your prostate with a rectal exam as well as urine or blood testing  You can help yourself by reducing the amount of fluid you drink before going to bed, limiting the amount of alcohol & caffeine you drink   Avoid cold & allergy medication that contain decongestants or antihistamines which make BPH symptoms worse  You can also "double void" by waiting a moment after urinating & trying again  Take your medication as prescribed - one medication helps to relax the muscle around the urethra and the other medication you may take prevents the prostate from growing more or even shrinking the prostate    Diagnosis: Landeros catheter in place  Assessment and Plan of Treatment: urinary retention. landeros cath changed 1/20 follow up with pcp and      PRINCIPAL DISCHARGE DIAGNOSIS  Diagnosis: Pseudomonas urinary tract infection  Assessment and Plan of Treatment: if you were prescribed antibiotics, take them exactly as your caregiver instructs you. Finish the medication even if you feel better after you have only taken some of the medication.  Drink enough water and fluids to keep your urine clear or pale yellow.  Avoid caffeine, tea, and carbonated beverages. They tend to irritate your bladder.  Empty your bladder often. Avoid holding urine for long periods of time.  After a bowel movement, women should cleanse from front to back. Use each tissue only once.  SEEK MEDICAL CARE IF:  You have back pain.  You develop a fever.  Your symptoms do not begin to resolve within 3 days.  SEEK IMMEDIATE MEDICAL CARE IF:  You have severe back pain or lower abdominal pain.  You develop chills.  You have nausea or vomiting.  You have continued burning or discomfort with urination.      SECONDARY DISCHARGE DIAGNOSES  Diagnosis: Hyponatremia  Assessment and Plan of Treatment: resolved    Diagnosis: Elevated liver enzymes  Assessment and Plan of Treatment: improved. follow up with PCP in 1 week    Diagnosis: T2DM (type 2 diabetes mellitus)  Assessment and Plan of Treatment: HA1C 6.4    Diagnosis: Stage 3b chronic kidney disease  Assessment and Plan of Treatment: Avoid taking (NSAIDs) - (ex: Ibuprofen, Advil, Celebrex, Naprosyn)  Avoid taking any nephrotoxic agents (can harm kidneys) - Intravenous contrast for diagnostic testing, combination cold medications.  Have all medications adjusted for your renal function by your Health Care Provider.  Blood pressure control is important.  Take all medication as prescribed.    Diagnosis: BPH (benign prostatic hyperplasia)  Assessment and Plan of Treatment: You have an enlarged prostate gland which gets bigger as men get older - it is a very common problem and has nothing to do with prostate cancer  Call your doctor if you are urinating more frequently, have trouble starting to urinate, have weak stream, urine leaking or dribbling, and feeling as though bladder is not empty after urination  Your doctor will monitor your prostate with a rectal exam as well as urine or blood testing  You can help yourself by reducing the amount of fluid you drink before going to bed, limiting the amount of alcohol & caffeine you drink   Avoid cold & allergy medication that contain decongestants or antihistamines which make BPH symptoms worse  You can also "double void" by waiting a moment after urinating & trying again  Take your medication as prescribed - one medication helps to relax the muscle around the urethra and the other medication you may take prevents the prostate from growing more or even shrinking the prostate    Diagnosis: Landeros catheter in place  Assessment and Plan of Treatment: urinary retention. landeros cath changed 1/20 follow up with pcp and  for trial of void

## 2021-01-25 NOTE — PROGRESS NOTE ADULT - PROBLEM SELECTOR PLAN 6
cont amiodarone 200mg PO qdaily.  eliquis 2.5mg PO BID- (renal dosing)

## 2021-01-25 NOTE — DISCHARGE NOTE PROVIDER - NSDCMRMEDTOKEN_GEN_ALL_CORE_FT
amiodarone 200 mg oral tablet: 1 tab(s) orally once a day  aspirin 81 mg oral tablet: 1 tab(s) orally once a day  Eliquis 5 mg oral tablet: 1 tab(s) orally 2 times a day  finasteride 5 mg oral tablet: 1 tab(s) orally once a day  latanoprost 0.005% ophthalmic solution: 1 drop(s) to each affected eye once a day (in the evening)  Lexapro 10 mg oral tablet: 1.5 tab(s) orally once a day  potassium chloride 10 mEq oral capsule, extended release: 1 cap(s) orally once a day  Restasis 0.05% ophthalmic emulsion: 1 drop(s) to each affected eye every 12 hours  simvastatin 20 mg oral tablet: 1 tab(s) orally once a day (at bedtime)  super beta prostate: 1 tab(s) orally 2 times a day  tamsulosin 0.4 mg oral capsule: 1 cap(s) orally once a day  Tradjenta 5 mg oral tablet: 1 tab(s) orally once a day  Vitamin D3 2000 intl units (50 mcg) oral tablet: 1 tab(s) orally once a day   amiodarone 200 mg oral tablet: 1 tab(s) orally once a day  apixaban 2.5 mg oral tablet: 1 tab(s) orally 2 times a day  aspirin 81 mg oral tablet: 1 tab(s) orally once a day  finasteride 5 mg oral tablet: 1 tab(s) orally once a day  latanoprost 0.005% ophthalmic solution: 1 drop(s) to each affected eye once a day (in the evening)  Lexapro 10 mg oral tablet: 1.5 tab(s) orally once a day  Restasis 0.05% ophthalmic emulsion: 1 drop(s) to each affected eye every 12 hours  simvastatin 20 mg oral tablet: 1 tab(s) orally once a day (at bedtime)  tamsulosin 0.4 mg oral capsule: 1 cap(s) orally once a day  Tradjenta 5 mg oral tablet: 1 tab(s) orally once a day  Vitamin D3 2000 intl units (50 mcg) oral tablet: 1 tab(s) orally once a day

## 2021-01-25 NOTE — PROGRESS NOTE ADULT - PROBLEM SELECTOR PLAN 1
Outpatient urine culture grew Pseudomonas aeruginosa  Patient now with allergy to ciprofloxacin  S/p meropenem x1 in the ER, continued on cefepime  1/20 UA , lg LE, +nitrite, few bacteria - culture grew >100k cfu/ml Pseudomonas   Cortes was exchanged this admission, repeat UA/culture as below.   1/21 Repeat UA with , lg LE, +nitrite, no bacteria - cx grew 50-99k Pseudomonas - pansensitive   Blood cultures NGTD x2  Remains afebrile, no leukocytosis.  Continue on cefepime 2g IV Q24h (renally adjusted) - complete today 7d course - end tomorrow 1/26

## 2021-01-25 NOTE — PROGRESS NOTE ADULT - ASSESSMENT
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      Problem/Plan - 1:  ·  Problem: Pseudomonas urinary tract infection.  Plan: Outpatient urine culture grew Pseudomonas aeruginosa  Patient now with allergy to ciprofloxacin  Landeros exchanged  S/p meropenem x1 in the ER, continued on cefepime  UA , lg LE, +nitrite, few bacteria - culture growing Pseudomonas   Repeat UA 1/21 with , lg LE, +nitrite, no bacteria  Blood cultures NGTD x2    Follow urine cultures  Continue on cefepime 2g IV Q24h (renally adjusted).      Problem/Plan - 2:  ·  Problem: Landeros catheter in place.  Plan: placed for urinary retention, has h/o recurrent UTIs treated with ciprofloxacin but developed rash/hives. Currently draining clear yellow urine. Urology consult for possible TOV.      Problem/Plan - 3:  ·  Problem: T2DM (type 2 diabetes mellitus).  Plan: Blood glucose management per primary team.      Problem/Plan - 4:  ·  Problem: Stage 3b chronic kidney disease.  Plan: antibiotics renally adjusted as above.       * Cultures and sensitivities reviewed.  Cultures growing sensitive pseudomonas.  Due to cipro allergy, cont cefepime 2gm IV q24 for duration of course. Day #4/7    Samantha Vela  [phone]
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

## 2021-01-25 NOTE — PROGRESS NOTE ADULT - PROBLEM SELECTOR PLAN 7
cont finasteride and tamsulosin.   Hold home dose of super beta prostate.

## 2021-01-25 NOTE — DISCHARGE NOTE PROVIDER - CARE PROVIDER_API CALL
Abdoulaye Ewing)  Urology  [address] [place] [address], [place] [address]  Phone: [phone]  Fax: [phone]  Follow Up Time:

## 2021-01-25 NOTE — PROGRESS NOTE ADULT - PROBLEM SELECTOR PROBLEM 1
Pseudomonas urinary tract infection
Recurrent UTI
Pseudomonas urinary tract infection
Recurrent UTI
Recurrent UTI

## 2021-01-25 NOTE — PROGRESS NOTE ADULT - PROBLEM SELECTOR PROBLEM 2
Cortes catheter in place
Stage 3b chronic kidney disease
Cortes catheter in place

## 2021-01-25 NOTE — PROGRESS NOTE ADULT - PROBLEM SELECTOR PROBLEM 4
T2DM (type 2 diabetes mellitus)
Stage 3b chronic kidney disease
Stage 3b chronic kidney disease
T2DM (type 2 diabetes mellitus)

## 2021-01-25 NOTE — PROGRESS NOTE ADULT - ATTENDING COMMENTS
ID will sign off at this time but remains available for any further questions/concerns.    Kike Smalls M.D.  [facility], Division of Infectious Diseases  [phone]  After 5pm on weekdays and all day on weekends - please call [phone]

## 2021-02-09 PROBLEM — E11.69 TYPE 2 DIABETES MELLITUS WITH OTHER SPECIFIED COMPLICATION: Chronic | Status: ACTIVE | Noted: 2021-01-01

## 2021-02-09 PROBLEM — T83.511A: Chronic | Status: ACTIVE | Noted: 2021-01-01

## 2021-02-09 PROBLEM — I63.9 CEREBRAL INFARCTION, UNSPECIFIED: Chronic | Status: ACTIVE | Noted: 2021-01-01

## 2021-02-09 PROBLEM — I49.9 CARDIAC ARRHYTHMIA, UNSPECIFIED: Chronic | Status: ACTIVE | Noted: 2021-01-01

## 2021-02-19 PROBLEM — R53.81 PHYSICAL DECONDITIONING: Status: ACTIVE | Noted: 2021-01-01

## 2021-02-22 PROBLEM — N39.41 URGE INCONTINENCE OF URINE: Status: ACTIVE | Noted: 2018-01-18

## 2021-02-22 PROBLEM — N39.0 RECURRENT UTI: Status: ACTIVE | Noted: 2020-12-23

## 2021-02-25 NOTE — HISTORY OF PRESENT ILLNESS
[FreeTextEntry1] : Patient is a 89 year old man. Recently discharged from the hospital fro treatment of pseudomonas UTI. \par In December 2020 catheter placed by Dr. Yo, for recurrent UTI,  and incontinence. \par Catheter last changed 2-3 weeks ago\par \par Daughter reports that in the last year patient has had 8 UTI's, and 2 hospitalizations . \par Stroke April 2018- Left side weakness\par History of BPH, currently on tamsulosin and finasteride \par Denies any fever, denies any chills.

## 2021-02-25 NOTE — ASSESSMENT
[FreeTextEntry1] : Discussed etiology of recurrent UTI.\par Reviewed hospital discharge records, labs, and imaging studies.\par \par Recommend to start methenamine/Vit C\par Goals of medication reviewed.  Discussed the potential adverse effects of the medication.  Discussed the proper administration of the medication.\par \par Follow up in three months.

## 2021-03-23 PROBLEM — N47.2 PARAPHIMOSIS: Status: ACTIVE | Noted: 2021-01-01

## 2021-11-06 NOTE — ED PROVIDER NOTE - OBJECTIVE STATEMENT
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 As per Heaven tafoya, patient had blood around the landeros catheter today and decreased urine output. No trauma or falls. Unknown last landeros change. Unknown if pt takes AC. Reports a fever 4 days ago but no cough, abd pain or diarrhea. Hx of multiple UTIs.

## 2021-11-06 NOTE — ED ADULT NURSE NOTE - INTERVENTIONS DEFINITIONS
[place] to call system/Call bell, personal items and telephone within reach/Physically safe environment: no spills, clutter or unnecessary equipment/Stretcher in lowest position, wheels locked, appropriate side rails in place

## 2021-11-06 NOTE — ED ADULT NURSE NOTE - OBJECTIVE STATEMENT
89 y/o Male presenting to the ED from home, A&Ox2 at baseline with aphasia from hx of stroke, sent in for blood around the meatus (around the landeros) and hematuria in the bag. Landeros placed on 10/28, pt has chronic Landeros. Blood noted in the depends around the landeros, hematuria with clots noted in the landeros bag. Pt unable to express needs but denies pain. As per EMS, home aide denies any fevers, chills, recent falls. As per EMS only 20cc of hematuria noted from the bag since arrival to pt around 2 hours ago. Hx of aspiration. Abdomen soft, nontender, nondistended. Safety and comfort measures provided, bed locked and in lowest position, side rails up for safety. Call bell within reach. Awaiting MD evaluation.

## 2021-11-06 NOTE — ED ADULT NURSE NOTE - CAS ELECT INFOMATION PROVIDED
to ems and provided 1 antibotiv pill schuster toays dose and pharmacy not open today/DC instructions

## 2021-11-06 NOTE — ED ADULT NURSE REASSESSMENT NOTE - NS ED NURSE REASSESS COMMENT FT1
MD Lopez made aware of dysphagia screen. PO medication to be cancelled and IV medication to be ordered.

## 2021-11-06 NOTE — ED PROVIDER NOTE - ATTENDING CONTRIBUTION TO CARE
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   Currently unable to obtain any history from patient but medical record was reviewed at baseline with aphasia from hx of stroke, sent in for blood around the meatus and in the bag, no abd pain or suprapubic distension, nurse requesting gu for landeros change,  consult, labs, ua. possible urinary retention, hematuria.

## 2021-11-06 NOTE — ED ADULT NURSE REASSESSMENT NOTE - NS ED NURSE REASSESS COMMENT FT1
Upon turning patient, bowel movement noted, cleaned and changed. Placed in gown. When going to change landeros as per MD verbal order, multiple clots and blood noted around the meatus, MD aware urology must be called for landeros change. MD Lopez aware and urology to be consulted. Large Stage 2 ulcer noted to the sacrum and stage three noted to the right upper back. Safety and comfort measures provided, bed locked and in lowest position, side rails up for safety. Call bell within reach. Awaiting MD evaluation.

## 2021-11-06 NOTE — ED PROVIDER NOTE - PHYSICAL EXAMINATION
Gen: AAOx2, non-toxic  Head: NCAT  HEENT: EOMI, oral mucosa moist, normal conjunctiva  Lung: CTAB, no respiratory distress, no wheezes/rhonchi/rales B/L, speaking in full sentences  CV: RRR, no murmurs, rubs or gallops  Abd: moderate suprapubic fullness and ttp,  no guarding, no CVA tenderness  : small amount around the penis meatus. Hematuria in the Cortes bag.   MSK: no visible deformities  Neuro: Unchanged neuro deficits from prior CVA   Skin: Warm, well perfused, no rash  Psych: normal affect.

## 2021-11-06 NOTE — ED ADULT NURSE REASSESSMENT NOTE - NS ED NURSE REASSESS COMMENT FT1
Urology at bedside to irrigate Cortes catheter. Urine sample to be collected and sent after Cortes irrigation as per MD Dejesus's order. Urology at bedside to replace Cortes catheter. Urine sample to be collected and sent after Cortes change as per MD Dejesus's order.

## 2021-11-06 NOTE — ED PROVIDER NOTE - NS ED ROS FT
GENERAL: fever 4 days ago   EYES: no change in vision  HEENT: no trouble swallowing or speaking  CARDIAC: no chest pain or palpiations   PULMONARY: no cough or SOB  GI: no abdominal pain, nausea, vomiting, diarrhea, or constipation   : see hpi  SKIN: no rashes  NEURO: no headache, numbness, or weakness.  MSK: No joint pain

## 2021-11-06 NOTE — PROCEDURE NOTE - ADDITIONAL PROCEDURE DETAILS
Original landeros not in place, balloon taken down with immediate return of urine. 16F landeros replaced under sterile technique with return of hematuria and a few clots. Landeros flushed with NS with no aspiration of clots and after 2-3 flushes urine cleared to light pink. Stat lock applied.

## 2021-11-06 NOTE — CHART NOTE - NSCHARTNOTEFT_GEN_A_CORE
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 Pt is poor historian, unable to obtain history.  On Exam, landeros not in place, balloon taken down with immediate return of urine. 16F landeros replaced with return of hematuria and a few clots. Landeros flushed with NS with no aspiration of clots and after 2-3 flushes urine cleared to light pink. Stat lock applied.   Hematuria likely due to landeros trauma, should improve overtime with increased fluids. 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 Pt is poor historian, unable to obtain history. Pt of Dr. Ewing's, last saw him in office on 2/22/21 and started on methenamine hippurate for recurrent UTI's.     On Exam, landeros not in place, balloon taken down with immediate return of urine. 16F landeros replaced with return of hematuria and a few clots. Landeros flushed with NS with no aspiration of clots and after 2-3 flushes urine cleared to light pink. Stat lock applied.   Hematuria likely due to landeros trauma, should improve overtime with increased fluids.

## 2021-11-06 NOTE — ED PROVIDER NOTE - PATIENT PORTAL LINK FT
You can access the FollowMyHealth Patient Portal offered by [facility] by registering at the following website: [email or web address]. By joining [facility]’s FollowMyHealth portal, you will also be able to view your health information using other applications (apps) compatible with our system.

## 2021-11-06 NOTE — ED ADULT NURSE REASSESSMENT NOTE - NS ED NURSE REASSESS COMMENT FT1
Handoff report received from FERNANDO Carlson. Pt resting in purple 20 on the left. Pt A&O x 1, to self, aphasic at baseline. Pt appears comfortable. VSS. Cortes catheter irrigated by urology, UA collected and sent. Linens and gown changed. Pt cleaned. Bed in lowest position, side rails up and call bell in reach. Handoff report received from FERNANDO Carlson. Pt resting in purple 20 on the left. Pt A&O x 1, to self, aphasic at baseline. Pt appears comfortable. VSS. Cortes catheter irrigated by urology, UA collected and sent. Pt cleaned, linens and gown changed. Stage 2 pressure ulcer noted to sacrum and L inside medial heel. MAD noted to groin and perineal area. Moisture barrier cream applied. Pt placed in heel floating boots. Bed in lowest position, side rails up and call bell in reach.

## 2021-11-06 NOTE — ED PROVIDER NOTE - CLINICAL SUMMARY MEDICAL DECISION MAKING FREE TEXT BOX
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 small amount around the penis meatus. Hematuria in the Landeros bag. Concerning for urinary retention. Carie for recurrent UTIs. Urology consulted.

## 2021-11-06 NOTE — ED PROVIDER NOTE - NSFOLLOWUPINSTRUCTIONS_ED_ALL_ED_FT
You were seen in the ED for blood in urine, found to have a urinary tract infection.   The following labs/imaging were obtained: see attached (if applicable)  Continue home medications (if any).   Take cefpodoxime as prescribed.   Return to the ED if you develop fever,  inability to urinate, worsening or new concerning symptoms.  Follow up with your primary care in 2-3 days.   Discussed with pt results of work up, strict return precautions, and need for follow up.  Pt expressed understanding and agrees with plan.    Urinary Tract Infection    A urinary tract infection (UTI) is an infection of any part of the urinary tract, which includes the kidneys, ureters, bladder, and urethra. Risk factors include ignoring your need to urinate, wiping back to front if female, being an uncircumcised male, and having diabetes or a weak immune system. Symptoms include frequent urination, pain or burning with urination, foul smelling urine, cloudy urine, pain in the lower abdomen, blood in the urine, and fever. If you were prescribed an antibiotic medicine, take it as told by your health care provider. Do not stop taking the antibiotic even if you start to feel better.    SEEK IMMEDIATE MEDICAL CARE IF YOU HAVE ANY OF THE FOLLOWING SYMPTOMS: severe back or abdominal pain, fever, inability to keep fluids or medicine down, dizziness/lightheadedness, or a change in mental status.

## 2021-11-07 NOTE — ED ADULT NURSE REASSESSMENT NOTE - NS ED NURSE REASSESS COMMENT FT1
Pt resting in purple 20L. Pt A&O x 1 with aphasia as per baseline. Pt denies needs at this time. Pt updated on plan of care. Bed in lowest position, side rails up and call bell in reach. [facility] to arrive at approx. 0800.

## 2021-11-07 NOTE — ED ADULT NURSE REASSESSMENT NOTE - NS ED NURSE REASSESS COMMENT FT1
0742 Report taken from suki RN pt pending [facility] Will dispatcher on last night spoke to Julianne supervisor for est time OF 0800 Spoke to the Aide who is at home to receive him Yola

## 2021-11-07 NOTE — ED ADULT NURSE REASSESSMENT NOTE - NS ED NURSE REASSESS COMMENT FT1
Upon RN reassessment of dysphagia status, pt able to swallow applesauce successfully. MD Mcintosh made aware. As per MD Mcintosh, pt ok to DC.

## 2021-11-29 PROBLEM — I63.9 STROKE: Status: ACTIVE | Noted: 2018-06-19

## 2021-11-29 PROBLEM — R05.9 COUGH: Status: ACTIVE | Noted: 2021-01-01

## 2021-11-29 PROBLEM — Z91.89 AT RISK FOR AMIODARONE TOXICITY WITH LONG TERM USE: Status: ACTIVE | Noted: 2020-01-27

## 2021-11-29 NOTE — HISTORY OF PRESENT ILLNESS
[FreeTextEntry1] : Mr. Baez presents in telehealth follow-up accompanied by his daughters and aide.  He offers no complaints.  He is now bedridden.  Early this month he was briefly hospitalized for recurrent (10th) urinary tract infection treated with Cefpoxidime.  He has an indwelling Cortes catheter.  Although he is being turned every 2 hours by his age, he has developed superficial sacral and heel erythema and ulcerations.  \par \par Appetite is good although he has lost weight.  Coughs and has some difficulty clearing his secretions.  Tessalon has been helpful in the past.\par \par No c/o chest, throat,jaw, arm or upper back discomfort.  . No chest, throat, jaw, or arm discomfort. No dyspnea, orthopnea or PND.  No palpitations, dizziness or syncope.  No edema.\par \par No new focal neurologic symptoms.\par  [Home] : at home, [unfilled] , at the time of the visit. [Medical Office: ([facility])___] : at the medical office located in  [Family Member] : family member [Verbal consent obtained from patient] : the patient, [unfilled]

## 2021-12-23 NOTE — ED PROVIDER NOTE - CLINICAL SUMMARY MEDICAL DECISION MAKING FREE TEXT BOX
Attending MD Isidro:  89M with ho CVA, dementia, chronic indwelling landeros catheter presenting with report of hypoxia (80s RA), ams and gross hematuria. Patient here is awake, does not respond to simple commands, tachypneic, RA geoffrey sat 89%, b/l anterior rhonchi, abdomen benign. Primary concern for hypoxic resp failure is CAP, viral PNA (including covid-19, vaccination status unknown at time of initial interview), CHF. Plan for CXR, labs, cultures, RVP, continue supplemental O2. Regarding AMS, likely metabolic or infectious encephalopathy but will obtain CT head to r/o ICH or mass lesion.       *The above represents an initial assessment/impression. Please refer to progress notes for potential changes in patient clinical course*

## 2021-12-23 NOTE — H&P ADULT - NSHPPHYSICALEXAM_GEN_ALL_CORE
Vital Signs Last 24 Hrs  T(C): 36.7 (23 Dec 2021 23:02), Max: 37.8 (23 Dec 2021 15:57)  T(F): 98.1 (23 Dec 2021 23:02), Max: 100 (23 Dec 2021 15:57)  HR: 56 (23 Dec 2021 23:02) (56 - 72)  BP: 144/66 (23 Dec 2021 23:02) (125/69 - 144/66)  BP(mean): 80 (23 Dec 2021 15:57) (80 - 80)  RR: 19 (23 Dec 2021 23:02) (19 - 22)  SpO2: 97% (23 Dec 2021 23:02) (94% - 98%) Vital Signs Last 24 Hrs  T(C): 36.7 (23 Dec 2021 23:02), Max: 37.8 (23 Dec 2021 15:57)  T(F): 98.1 (23 Dec 2021 23:02), Max: 100 (23 Dec 2021 15:57)  HR: 56 (23 Dec 2021 23:02) (56 - 72)  BP: 144/66 (23 Dec 2021 23:02) (125/69 - 144/66)  BP(mean): 80 (23 Dec 2021 15:57) (80 - 80)  RR: 19 (23 Dec 2021 23:02) (19 - 22)  SpO2: 97% (23 Dec 2021 23:02) (94% - 98%)    GENERAL: alert , not oriented , minimally responsive , does not follow commands ,   HEAD:  Atraumatic, Normocephalic  ENT: EOMI, PERRLA, conjunctiva and sclera clear,  moist mucosa no pharyngeal erythema or exudates   NECK: supple , no JVD   CHEST/LUNG: breathing is regular non labored on NC 4L , Gurgling present ,  No wheeze, equal breath sounds bilaterally   BACK: No spinal tenderness,  No CVA tenderness   HEART: Regular rate and rhythm; No appreciable murmurs, rubs, or gallops  ABDOMEN: Soft, Nontender, Nondistended; Bowel sounds present  EXTREMITIES:  No clubbing, cyanosis, trace edema  MSK: No joint swelling or effusions, ROM intact   PSYCH: Normal behavior/affect  NEUROLOGY: AAOx0,  cranial nerves intact  SKIN: Normal color, No rashes or open lesions

## 2021-12-23 NOTE — H&P ADULT - NSHPOUTPATIENTPROVIDERS_GEN_ALL_CORE
Dr. Kenan Do-[facility]- [facility]  Dr. Randolph Ford=- Cardiologist  Dr. Yo- Urologist

## 2021-12-23 NOTE — ED ADULT NURSE NOTE - NSIMPLEMENTINTERV_GEN_ALL_ED
Implemented All Fall with Harm Risk Interventions:  [place] to call system. Call bell, personal items and telephone within reach. Instruct patient to call for assistance. Room bathroom lighting operational. Non-slip footwear when patient is off stretcher. Physically safe environment: no spills, clutter or unnecessary equipment. Stretcher in lowest position, wheels locked, appropriate side rails in place. Provide visual cue, wrist band, yellow gown, etc. Monitor gait and stability. Monitor for mental status changes and reorient to person, place, and time. Review medications for side effects contributing to fall risk. Reinforce activity limits and safety measures with patient and family. Provide visual clues: red socks.

## 2021-12-23 NOTE — ED PROVIDER NOTE - CARE PLAN
1 Principal Discharge DX:	Acute respiratory failure with hypoxia  Secondary Diagnosis:	Acute metabolic encephalopathy   Principal Discharge DX:	Acute respiratory failure with hypoxia  Secondary Diagnosis:	Acute metabolic encephalopathy  Secondary Diagnosis:	COVID-19

## 2021-12-23 NOTE — H&P ADULT - PROBLEM SELECTOR PLAN 8
HCP is daughter Aurelia [phone] and wife , living will in chart , patient specified to not undergo life sustaining measures such as CPR and mechanical intubation   - DNR/DNI

## 2021-12-23 NOTE — ED PROVIDER NOTE - ATTENDING CONTRIBUTION TO CARE
Attending MD Isidro:  I personally have seen and examined this patient. I have performed a substantive portion of the visit including all aspects of the medical decision making.  Resident note reviewed and agree on plan of care and except where noted.  See HPI, PE, and MDM for details.

## 2021-12-23 NOTE — ED ADULT NURSE REASSESSMENT NOTE - NS ED NURSE REASSESS COMMENT FT1
Patient handoff received from FERNANDO Salinas. Patient is AOx1, NAD at this time. safety maintained, awaiting dispo

## 2021-12-23 NOTE — H&P ADULT - PROBLEM SELECTOR PLAN 2
GGo's on CT , no solid consolidation , low suspicion for bacterial pna , procal < 1   -Continue Dexamethaone 6mg x 10 day course  -  Will hold off on starting  Remdesivir given renal dysfunction and elevated liver tests , Day team can discuss further with ID   - monitor liver tests   - Airborne + contact Isolation   - Observe off antibiotics   - Guaifenesin/ dextromethorphan PRN

## 2021-12-23 NOTE — H&P ADULT - NSHPSOURCEINFOTX_GEN_ALL_CORE
HCP Aurelia Baez ( daughter)  requests granddaughter Dr Kenan Mccauley ( GYN) to be actively  involved in patients care

## 2021-12-23 NOTE — H&P ADULT - ASSESSMENT
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

## 2021-12-23 NOTE — ED ADULT NURSE NOTE - HIV OFFER
Previously Declined (within the last year) Complex Repair And Burow's Graft Text: The defect edges were debeveled with a #15 scalpel blade.  The primary defect was closed partially with a complex linear closure.  Given the location of the defect, shape of the defect, the proximity to free margins and the presence of a standing cone deformity a Burow's graft was deemed most appropriate to repair the remaining defect.  The graft was trimmed to fit the size of the remaining defect.  The graft was then placed in the primary defect, oriented appropriately, and sutured into place.

## 2021-12-23 NOTE — ED PROVIDER NOTE - OBJECTIVE STATEMENT
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 According to family from EMS, pt baseline AOx3. Pt coming in with AMS and dyspnea with sats in 80's, placed on 4L with improvement to 95%. He spontaneously opens his eyes but does not respond to voice. Unable to get history from pt. Presents with blood in urine bag, wet cough, dyspnea.

## 2021-12-23 NOTE — H&P ADULT - NSHPLABSRESULTS_GEN_ALL_CORE
Labs personally reviewed:                          12.8   11.97 )-----------( 237      ( 23 Dec 2021 16:22 )             37.5     12-23    134<L>  |  103  |  40<H>  ----------------------------<  167<H>  3.8   |  17<L>  |  1.55<H>    Ca    8.4      23 Dec 2021 16:22    TPro  6.1  /  Alb  2.2<L>  /  TBili  0.6  /  DBili  x   /  AST  180<H>  /  ALT  125<H>  /  AlkPhos  162<H>  12-23        LIVER FUNCTIONS - ( 23 Dec 2021 16:22 )  Alb: 2.2 g/dL / Pro: 6.1 g/dL / ALK PHOS: 162 U/L / ALT: 125 U/L / AST: 180 U/L / GGT: x           PT/INR - ( 23 Dec 2021 16:22 )   PT: 17.4 sec;   INR: 1.48 ratio         PTT - ( 23 Dec 2021 16:22 )  PTT:38.5 sec    CAPILLARY BLOOD GLUCOSE          Imaging:  CXR personally reviewed: Ill-defined left lower lung opacity which obscures the left hemidiaphragm   and may represent atelectasis with underlying pleural effusion or   infectious consolidation depending on the clinical setting.    CTA chest : No pulmonary embolus.    Peripheral ground glass opacities noted bilaterally, which may represent   COVID in the right clinical setting.    Bilateral endobronchial secretions noted.    CTh: Old right PCA territory infarct with ex vacuo dilatation of   the occipital horn of the right lateral ventricle. Ventricles are   prominent out of proportion to the sulci consider NPH      EKG personally reviewed: Labs personally reviewed:                          12.8   11.97 )-----------( 237      ( 23 Dec 2021 16:22 )             37.5     12-23    134<L>  |  103  |  40<H>  ----------------------------<  167<H>  3.8   |  17<L>  |  1.55<H>    Ca    8.4      23 Dec 2021 16:22    TPro  6.1  /  Alb  2.2<L>  /  TBili  0.6  /  DBili  x   /  AST  180<H>  /  ALT  125<H>  /  AlkPhos  162<H>  12-23        LIVER FUNCTIONS - ( 23 Dec 2021 16:22 )  Alb: 2.2 g/dL / Pro: 6.1 g/dL / ALK PHOS: 162 U/L / ALT: 125 U/L / AST: 180 U/L / GGT: x           PT/INR - ( 23 Dec 2021 16:22 )   PT: 17.4 sec;   INR: 1.48 ratio         PTT - ( 23 Dec 2021 16:22 )  PTT:38.5 sec    CAPILLARY BLOOD GLUCOSE          Imaging:  CXR personally reviewed: Ill-defined left lower lung opacity which obscures the left hemidiaphragm   and may represent atelectasis with underlying pleural effusion or   infectious consolidation depending on the clinical setting.    CTA chest : No pulmonary embolus.    Peripheral ground glass opacities noted bilaterally, which may represent   COVID in the right clinical setting.    Bilateral endobronchial secretions noted.    CTh: Old right PCA territory infarct with ex vacuo dilatation of   the occipital horn of the right lateral ventricle. Ventricles are   prominent out of proportion to the sulci consider NPH      EKG personally reviewed: wide complex regular rhythm at 73 bpm , RBBB ,

## 2021-12-23 NOTE — H&P ADULT - PROBLEM SELECTOR PLAN 5
s/p indwelling landeros , family concerned of bleeding  , there is blood in landeros , but appears to be draining   - monitor ins and outs   -  consult- to be arranged by day team  - continue finasteride   - continue tamsulosin

## 2021-12-23 NOTE — ED PROVIDER NOTE - PROGRESS NOTE DETAILS
Attending MD Isidro: trop elevation to 80s noted, ECG without diagnostic ischemic changes, will continue to trend. Uncertain significance of trop elevation but do not suspect Type 1 MI at this juncture. Covid-19 positive PCR. Will try to clarify vaccination status but given hypoxia, will start IV decadron. Tutu Miller MD (PGY1)    Sign-out received from Dr. Mas. Will reassess patient. Pending UA. To be admitted. Tutu Miller MD (PGY1)    Sign-out received from Dr. Mas. Will reassess patient. Pending CTA. Covid positive. To be admitted. Tutu Miller MD (PGY1)     CTA neg for PE. Will admit for AMS in setting of covid pna and UTI.

## 2021-12-23 NOTE — H&P ADULT - HISTORY OF PRESENT ILLNESS
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   Per daughter ,  patients home attendant noted that patient was breathing heavy on day of admission.  Patient has been gurgling during this time. Per family , patient has been having chest congestion for the past month , intermittent coughing with white sputum, occasional dysphagia with aspiration. There were no reported fever or chills. Per family , patient is bedbound , and has been sleeping most of the day for the past few months, with minimal interaction, although at times does perk up and may communicate.  Family was also concerned  about bleeding in the landeros cath , which has been occurring intermittently since it was exchanged on 12/15.

## 2021-12-23 NOTE — ED PROVIDER NOTE - PHYSICAL EXAMINATION
General appearance: spontaneously opens eyes, does not respond to voice   Eyes: anicteric sclerae   HENT: Atraumatic; no rhinorrhea   Neck: Trachea midline; Full range of motion, supple   Pulm: rhonchi bl, sat 95% on 4LNC, normal respiratory effort and no intercostal retractions, normal work of breathing   CV: RRR, No murmurs, rubs, or gallops.    Abdomen: Soft, non-distended; no guarding or rebound   : landeros in place, blood in landeros bag, blood in diaper   Extremities: edema in bilateral hands, R>L arm edema, No peripheral edema or extremity lymphadenopathy in BLE   Skin: BUE skin weepy   Psych: AOx0

## 2021-12-24 NOTE — PATIENT PROFILE ADULT - FALL HARM RISK - HARM RISK INTERVENTIONS
Assistance with ambulation/Assistance OOB with selected safe patient handling equipment/Communicate Risk of Fall with Harm to all staff/Discuss with provider need for PT consult/Monitor gait and stability/Reinforce activity limits and safety measures with patient and family/Tailored Fall Risk Interventions/Visual Cue: Yellow wristband and red socks/Bed in lowest position, wheels locked, appropriate side rails in place/Call bell, personal items and telephone in reach/Instruct patient to call for assistance before getting out of bed or chair/Non-slip footwear when patient is out of bed/[place] to call system/Physically safe environment - no spills, clutter or unnecessary equipment/Purposeful Proactive Rounding/Room/bathroom lighting operational, light cord in reach

## 2021-12-24 NOTE — PROGRESS NOTE ADULT - ASSESSMENT
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

## 2021-12-24 NOTE — CONSULT NOTE ADULT - SUBJECTIVE AND OBJECTIVE BOX
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   Per daughter ,patients home attendant noted that patient was breathing heavy on day of admission.  Patient has been gurgling during this time. Per family , patient has been having chest congestion for the past month , intermittent coughing with white sputum, occasional dysphagia with aspiration. There were no reported fever or chills. Per family , patient is bedbound , and has been sleeping most of the day for the past few months, with minimal interaction, although at times does perk up and may communicate.  Family was also concerned  about bleeding in the landeros cath , which has been occurring intermittently since it was exchanged on 12/15.  (23 Dec 2021 23:24)      REVIEW OF SYSTEMS:    CONSTITUTIONAL: No fever, weight loss, or fatigue  EYES: No eye pain, visual disturbances, or discharge  ENMT:  No sore throat  NECK: No pain or stiffness  RESPIRATORY: No cough, wheezing, chills or hemoptysis; No shortness of breath  CARDIOVASCULAR: No chest pain, palpitations, dizziness, or leg swelling  GASTROINTESTINAL: No abdominal or epigastric pain. No nausea, vomiting, or hematemesis; No diarrhea or constipation. No melena or hematochezia.  GENITOURINARY: No dysuria, frequency, hematuria, or incontinence  NEUROLOGICAL: No headaches, memory loss, loss of strength, numbness, or tremors  SKIN: No itching, burning, rashes, or lesions   LYMPH NODES: No enlarged glands  MUSCULOSKELETAL: No joint pain or swelling; No muscle, back, or extremity pain      PAST MEDICAL & SURGICAL HISTORY:  Type 2 diabetes mellitus with other specified complication, unspecified whether long term insulin use    Cerebrovascular accident (CVA), unspecified mechanism    Ventricular arrhythmia  On Amiodarone    Urinary tract infection associated with indwelling urethral catheter, initial encounter    History of appendectomy    Perforated bowel  2003/[facility]    History of cataract extraction, unspecified laterality    Ganglion of left wrist        Allergies    Cipro (Hives)  fish (Unknown)    Intolerances    Pollen, hayfever (Unknown)      FAMILY HISTORY:  FH: myocardial infarction  parents        SOCIAL HISTORY:    The patient ambulates with a walker.  He denies tobacco or alcohol use.  He has a home health aide.    MEDICATIONS  (STANDING):  aMIOdarone    Tablet 200 milliGRAM(s) Oral daily  apixaban 2.5 milliGRAM(s) Oral every 12 hours  aspirin enteric coated 81 milliGRAM(s) Oral daily  cholecalciferol 2000 Unit(s) Oral daily  dexAMETHasone  Injectable 6 milliGRAM(s) IV Push daily  dextrose 40% Gel 15 Gram(s) Oral once  dextrose 5%. 1000 milliLiter(s) (50 mL/Hr) IV Continuous <Continuous>  dextrose 5%. 1000 milliLiter(s) (100 mL/Hr) IV Continuous <Continuous>  dextrose 50% Injectable 25 Gram(s) IV Push once  dextrose 50% Injectable 12.5 Gram(s) IV Push once  escitalopram 15 milliGRAM(s) Oral daily  finasteride 5 milliGRAM(s) Oral daily  glucagon  Injectable 1 milliGRAM(s) IntraMuscular once  insulin lispro (ADMELOG) corrective regimen sliding scale   SubCutaneous three times a day before meals  insulin lispro (ADMELOG) corrective regimen sliding scale   SubCutaneous at bedtime  latanoprost 0.005% Ophthalmic Solution 1 Drop(s) Both EYES at bedtime  simvastatin 20 milliGRAM(s) Oral at bedtime  tamsulosin 0.4 milliGRAM(s) Oral at bedtime    MEDICATIONS  (PRN):  acetaminophen     Tablet .. 650 milliGRAM(s) Oral every 4 hours PRN Temp greater or equal to 38.5C (101.3F)  guaifenesin/dextromethorphan Oral Liquid 10 milliLiter(s) Oral every 4 hours PRN Cough      Vital Signs Last 24 Hrs  T(C): 36.6 (24 Dec 2021 11:43), Max: 37.8 (23 Dec 2021 15:57)  T(F): 97.8 (24 Dec 2021 11:43), Max: 100 (23 Dec 2021 15:57)  HR: 57 (24 Dec 2021 11:43) (55 - 72)  BP: 113/65 (24 Dec 2021 11:43) (113/65 - 164/80)  BP(mean): 80 (23 Dec 2021 15:57) (80 - 80)  RR: 18 (24 Dec 2021 11:43) (18 - 20)  SpO2: 93% (24 Dec 2021 11:43) (93% - 98%)    PHYSICAL EXAM:    GENERAL: NAD, well-groomed  HEAD:  Atraumatic, Normocephalic  EYES: EOMI, PERRLA, conjunctiva and sclera clear  ENMT: No tonsillar erythema, exudates, or enlargement; Moist mucous membranes  NECK: Supple, No JVD  CHEST/LUNG: Clear to percussion bilaterally; No rales, rhonchi, wheezing, or rubs  HEART: Regular rate and rhythm; No murmurs, rubs, or gallops  ABDOMEN: Soft, Nontender, Nondistended; Bowel sounds present  EXTREMITIES:  2+ Peripheral Pulses, No clubbing, cyanosis, or edema  LYMPH: No lymphadenopathy noted  SKIN: No rashes or lesions    LABS:  CBC Full  -  ( 24 Dec 2021 05:57 )  WBC Count : 7.20 K/uL  RBC Count : 3.73 M/uL  Hemoglobin : 12.3 g/dL  Hematocrit : 36.1 %  Platelet Count - Automated : 204 K/uL  Mean Cell Volume : 96.8 fl  Mean Cell Hemoglobin : 33.0 pg  Mean Cell Hemoglobin Concentration : 34.1 gm/dL  Auto Neutrophil # : x  Auto Lymphocyte # : x  Auto Monocyte # : x  Auto Eosinophil # : x  Auto Basophil # : x  Auto Neutrophil % : x  Auto Lymphocyte % : x  Auto Monocyte % : x  Auto Eosinophil % : x  Auto Basophil % : x      12-24    133<L>  |  103  |  39<H>  ----------------------------<  214<H>  4.0   |  20<L>  |  1.52<H>    Ca    8.1<L>      24 Dec 2021 05:57    TPro  5.7<L>  /  Alb  2.0<L>  /  TBili  0.6  /  DBili  x   /  AST  136<H>  /  ALT  112<H>  /  AlkPhos  133<H>  12-24      LIVER FUNCTIONS - ( 24 Dec 2021 05:57 )  Alb: 2.0 g/dL / Pro: 5.7 g/dL / ALK PHOS: 133 U/L / ALT: 112 U/L / AST: 136 U/L / GGT: x                 MICROBIOLOGY:    Respiratory Viral Panel with COVID-19 by JASON (12.23.21 @ 16:34)   Rapid RVP Result: Detected       RADIOLOGY:    < from: CT Angio Chest PE Protocol w/ IV Cont (12.23.21 @ 20:02) >  IMPRESSION:    No pulmonary embolus.    Peripheral ground glass opacities noted bilaterally, which may represent   COVID in the right clinical setting.    Bilateral endobronchial secretions noted.    < end of copied text >      < from: Xray Chest 1 View- PORTABLE-Urgent (12.23.21 @ 15:41) >  FINDINGS:    The heart size is difficult to evaluate in this projection. Thoracic   aortic calcifications.  Low lung volumes bilaterally. Ill-defined left lower lung opacity which   obscures theleft hemidiaphragm.  Possible small left pleural effusion. No pneumothorax.    IMPRESSION:  Ill-defined left lower lung opacity which obscures the left hemidiaphragm   and may represent atelectasis with underlying pleural effusion or   infectious consolidation depending on the clinical setting.    < end of copied text >      < from: CT Head No Cont (12.23.21 @ 15:34) >  FINDINGS:  VENTRICLES AND SULCI: Age-appropriate involutional changes which appear   to have mildly progressed. Ventricles do appear slightly out of   proportion to the sulci consider NPH. Ex vacuo dilatation of the   occipital horn of the right lateral ventricle  INTRA-AXIAL:  Old right PCA territory infarct. Encephalomalacia in the   right parietal occipital region noted. Microvascular ischemic changes   involving the periventricular and subcortical white matter  EXTRA-AXIAL:  No mass or collection is seen.  VISUALIZED SINUSES:  Clear.  VISUALIZED MASTOIDS:  Right mastoid fluid with complete opacification of   the right mastoid air cells seen on the priorimaging as well.  CALVARIUM:  Normal.  MISCELLANEOUS: Bilateral cataract surgery    IMPRESSION:  Old right PCA territory infarct with ex vacuo dilatation of   the occipital horn of the right lateral ventricle. Ventricles are   prominent out of proportion to the sulci consider NPH    < end of copied text >

## 2021-12-24 NOTE — PROGRESS NOTE ADULT - SUBJECTIVE AND OBJECTIVE BOX
SUBJECTIVE / OVERNIGHT EVENTS:  awake but minimally communicative.  eyes open. very slow to answer yes or no.   no cp, no sob, no n/v/d. no abdominal pain.  no headache, no dizziness.   comfortable on NC      --------------------------------------------------------------------------------------------  LABS:                        12.3   7.20  )-----------( 204      ( 24 Dec 2021 05:57 )             36.1     12-24    133<L>  |  103  |  39<H>  ----------------------------<  214<H>  4.0   |  20<L>  |  1.52<H>    Ca    8.1<L>      24 Dec 2021 05:57    TPro  5.7<L>  /  Alb  2.0<L>  /  TBili  0.6  /  DBili  x   /  AST  136<H>  /  ALT  112<H>  /  AlkPhos  133<H>  12-24    PT/INR - ( 23 Dec 2021 16:22 )   PT: 17.4 sec;   INR: 1.48 ratio         PTT - ( 23 Dec 2021 16:22 )  PTT:38.5 sec  CAPILLARY BLOOD GLUCOSE      POCT Blood Glucose.: 178 mg/dL (24 Dec 2021 12:07)  POCT Blood Glucose.: 178 mg/dL (24 Dec 2021 07:50)            RADIOLOGY & ADDITIONAL TESTS:    Imaging Personally Reviewed:  [x] YES  [ ] NO    Consultant(s) Notes Reviewed:  [x] YES  [ ] NO    MEDICATIONS  (STANDING):  aMIOdarone    Tablet 200 milliGRAM(s) Oral daily  apixaban 2.5 milliGRAM(s) Oral every 12 hours  aspirin enteric coated 81 milliGRAM(s) Oral daily  cholecalciferol 2000 Unit(s) Oral daily  dexAMETHasone  Injectable 6 milliGRAM(s) IV Push daily  dextrose 40% Gel 15 Gram(s) Oral once  dextrose 5%. 1000 milliLiter(s) (50 mL/Hr) IV Continuous <Continuous>  dextrose 5%. 1000 milliLiter(s) (100 mL/Hr) IV Continuous <Continuous>  dextrose 50% Injectable 25 Gram(s) IV Push once  dextrose 50% Injectable 12.5 Gram(s) IV Push once  escitalopram 15 milliGRAM(s) Oral daily  finasteride 5 milliGRAM(s) Oral daily  glucagon  Injectable 1 milliGRAM(s) IntraMuscular once  insulin lispro (ADMELOG) corrective regimen sliding scale   SubCutaneous three times a day before meals  insulin lispro (ADMELOG) corrective regimen sliding scale   SubCutaneous at bedtime  latanoprost 0.005% Ophthalmic Solution 1 Drop(s) Both EYES at bedtime  simvastatin 20 milliGRAM(s) Oral at bedtime  tamsulosin 0.4 milliGRAM(s) Oral at bedtime    MEDICATIONS  (PRN):  acetaminophen     Tablet .. 650 milliGRAM(s) Oral every 4 hours PRN Temp greater or equal to 38.5C (101.3F)  guaifenesin/dextromethorphan Oral Liquid 10 milliLiter(s) Oral every 4 hours PRN Cough      Care Discussed with Consultants/Other Providers [x] YES  [ ] NO    Vital Signs Last 24 Hrs  T(C): 36.6 (24 Dec 2021 11:43), Max: 37.8 (23 Dec 2021 15:57)  T(F): 97.8 (24 Dec 2021 11:43), Max: 100 (23 Dec 2021 15:57)  HR: 57 (24 Dec 2021 11:43) (55 - 72)  BP: 113/65 (24 Dec 2021 11:43) (113/65 - 164/80)  BP(mean): 80 (23 Dec 2021 15:57) (80 - 80)  RR: 18 (24 Dec 2021 11:43) (18 - 22)  SpO2: 93% (24 Dec 2021 11:43) (93% - 98%)  I&O's Summary    23 Dec 2021 07:01  -  24 Dec 2021 07:00  --------------------------------------------------------  IN: 0 mL / OUT: 500 mL / NET: -500 mL    24 Dec 2021 07:01  -  24 Dec 2021 13:04  --------------------------------------------------------  IN: 80 mL / OUT: 0 mL / NET: 80 mL      PHYSICAL EXAM:  GENERAL: NAD, well-developed, comfortable on NC  HEAD:  Atraumatic, Normocephalic  EYES: EOMI, PERRLA, conjunctiva and sclera clear  NECK: Supple, No JVD  CHEST/LUNG: Clear to auscultation bilaterally; No wheeze  HEART: Regular rate and rhythm; No murmurs, rubs, or gallops  ABDOMEN: Soft, Nontender, Nondistended; Bowel sounds present  : Cortes in place, danielle color urine, neg CVAT   Neuro: AAOx0, eyes open, awake but no meaningful interaction,   EXTREMITIES:  2+ Peripheral Pulses, No clubbing, cyanosis, +B/L UE edema.  LE b/l less edematous   SKIN: No rashes or lesions

## 2021-12-25 NOTE — DIETITIAN NUTRITION RISK NOTIFICATION - ADDITIONAL COMMENTS/DIETITIAN RECOMMENDATIONS
Recommend Glucerna Shake x2 daily. Consider addition of Multivitamin, Thiamin, and Vitamin C supplementation pending no existing contraindications in setting of suboptimal intake and to facilitate wound healing.

## 2021-12-25 NOTE — DIETITIAN INITIAL EVALUATION ADULT. - ADD RECOMMEND
Continue diet as ordered. Recommend Glucerna Shake x2 daily. Consider addition of Multivitamin, Thiamin, and Vitamin C supplementation pending no existing contraindications in setting of suboptimal intake and to facilitate wound healing. Monitor PO intake/tolerance, weights, labs, hydration status, bowels, and skin integrity.

## 2021-12-25 NOTE — DIETITIAN INITIAL EVALUATION ADULT. - PERTINENT MEDS FT
MEDICATIONS  (STANDING):  aMIOdarone    Tablet 200 milliGRAM(s) Oral daily  apixaban 2.5 milliGRAM(s) Oral every 12 hours  aspirin enteric coated 81 milliGRAM(s) Oral daily  cholecalciferol 2000 Unit(s) Oral daily  dexAMETHasone  Injectable 6 milliGRAM(s) IV Push daily  dextrose 40% Gel 15 Gram(s) Oral once  dextrose 5%. 1000 milliLiter(s) (50 mL/Hr) IV Continuous <Continuous>  dextrose 5%. 1000 milliLiter(s) (100 mL/Hr) IV Continuous <Continuous>  dextrose 50% Injectable 25 Gram(s) IV Push once  dextrose 50% Injectable 12.5 Gram(s) IV Push once  escitalopram 15 milliGRAM(s) Oral daily  finasteride 5 milliGRAM(s) Oral daily  glucagon  Injectable 1 milliGRAM(s) IntraMuscular once  insulin lispro (ADMELOG) corrective regimen sliding scale   SubCutaneous three times a day before meals  insulin lispro (ADMELOG) corrective regimen sliding scale   SubCutaneous at bedtime  latanoprost 0.005% Ophthalmic Solution 1 Drop(s) Both EYES at bedtime  simvastatin 20 milliGRAM(s) Oral at bedtime  tamsulosin 0.4 milliGRAM(s) Oral at bedtime    MEDICATIONS  (PRN):  acetaminophen     Tablet .. 650 milliGRAM(s) Oral every 4 hours PRN Temp greater or equal to 38.5C (101.3F)  guaifenesin/dextromethorphan Oral Liquid 10 milliLiter(s) Oral every 4 hours PRN Cough

## 2021-12-25 NOTE — DIETITIAN NUTRITION RISK NOTIFICATION - TREATMENT: THE FOLLOWING DIET HAS BEEN RECOMMENDED
Diet, Regular:   Pureed (PUREED)  Moderately Thick Liquids (MODTHICKLIQS) (12-24-21 @ 00:28) [Active]

## 2021-12-25 NOTE — DIETITIAN INITIAL EVALUATION ADULT. - PERTINENT LABORATORY DATA
12-25 Na134 mmol/L<L> Glu 190 mg/dL<H> K+ 4.3 mmol/L Cr  1.44 mg/dL<H> BUN 46 mg/dL<H> Phos n/a   Alb 2.2 g/dL<L> PAB n/a

## 2021-12-25 NOTE — DIETITIAN INITIAL EVALUATION ADULT. - ORAL INTAKE PTA/DIET HISTORY
Unable to obtain subjective information from pt at this time; pt disoriented/confused. Per H&P family noted "patient has been having chest congestion for the past month, intermittent coughing with white sputum, occasional dysphagia with aspiration." Allergic to fish (unknown reaction). Micronutrient supplementation includes Vitamin D3.

## 2021-12-25 NOTE — DIETITIAN INITIAL EVALUATION ADULT. - OTHER INFO
Pt currently ordered for pureed diet. PCA notes pt tolerates diet, yet is not eating much, appears to like fluids more. PCA notes Pt without nausea/vomiting, diarrhea/constipation or other signs of acute GI distress at this time; last BM 12/24 - no bowel regimen ordered at this time.    Weight Hx per [facility] Growth Chart: 245Ibs (4/2018), 217Ibs (7/2018), 196Ibs (5/2019), 179Ibs (1/2021), 160Ibs (11/2021) - indicates ~11% wt loss x 10 months  Dosing weight 220Ibs (12/24) ??? - Pt does not appear to be 220Ibs, appears closer to HIE weight from 11/2021 of 160Ibs, yet suspect further weight loss. Pt was however wrapped in blankets, but was able to observe severe muscle wasting of shoulders and clavicles.    Pt with Hx of DM, A1c 6.3% (12/24) - indicates optimal glycemic control PTA. Per H&P, medically manages with [facility] PTA  - Currently receiving Insulin Sliding Scale in-house; noted on steroid course in setting of COVID PNA

## 2021-12-25 NOTE — PROGRESS NOTE ADULT - SUBJECTIVE AND OBJECTIVE BOX
SUBJECTIVE / OVERNIGHT EVENTS:  No overnight events.  No cp/sob/n/v/d.  No HA/dizziness.  No abdominal pain.   awake but no meaningful communication.  he denied pain       --------------------------------------------------------------------------------------------  LABS:                        13.9   10.56 )-----------( 234      ( 25 Dec 2021 06:34 )             39.9     12-25    134<L>  |  103  |  46<H>  ----------------------------<  190<H>  4.3   |  19<L>  |  1.44<H>    Ca    8.4      25 Dec 2021 06:34  Mg     1.8     12-25    TPro  6.3  /  Alb  2.2<L>  /  TBili  0.6  /  DBili  x   /  AST  107<H>  /  ALT  103<H>  /  AlkPhos  154<H>  12-25      CAPILLARY BLOOD GLUCOSE      POCT Blood Glucose.: 216 mg/dL (25 Dec 2021 11:58)  POCT Blood Glucose.: 185 mg/dL (25 Dec 2021 07:46)  POCT Blood Glucose.: 191 mg/dL (24 Dec 2021 21:50)    CARDIAC MARKERS ( 24 Dec 2021 16:08 )  x     / x     / 37 U/L / x     / 1.8 ng/mL          RADIOLOGY & ADDITIONAL TESTS:    Imaging Personally Reviewed:  [x] YES  [ ] NO    Consultant(s) Notes Reviewed:  [x] YES  [ ] NO    MEDICATIONS  (STANDING):  aMIOdarone    Tablet 200 milliGRAM(s) Oral daily  apixaban 2.5 milliGRAM(s) Oral every 12 hours  aspirin enteric coated 81 milliGRAM(s) Oral daily  cholecalciferol 2000 Unit(s) Oral daily  dexAMETHasone  Injectable 6 milliGRAM(s) IV Push daily  dextrose 40% Gel 15 Gram(s) Oral once  dextrose 5%. 1000 milliLiter(s) (50 mL/Hr) IV Continuous <Continuous>  dextrose 5%. 1000 milliLiter(s) (100 mL/Hr) IV Continuous <Continuous>  dextrose 50% Injectable 25 Gram(s) IV Push once  dextrose 50% Injectable 12.5 Gram(s) IV Push once  escitalopram 15 milliGRAM(s) Oral daily  finasteride 5 milliGRAM(s) Oral daily  glucagon  Injectable 1 milliGRAM(s) IntraMuscular once  insulin lispro (ADMELOG) corrective regimen sliding scale   SubCutaneous three times a day before meals  insulin lispro (ADMELOG) corrective regimen sliding scale   SubCutaneous at bedtime  latanoprost 0.005% Ophthalmic Solution 1 Drop(s) Both EYES at bedtime  simvastatin 20 milliGRAM(s) Oral at bedtime  tamsulosin 0.4 milliGRAM(s) Oral at bedtime    MEDICATIONS  (PRN):  acetaminophen     Tablet .. 650 milliGRAM(s) Oral every 4 hours PRN Temp greater or equal to 38.5C (101.3F)  guaifenesin/dextromethorphan Oral Liquid 10 milliLiter(s) Oral every 4 hours PRN Cough      Care Discussed with Consultants/Other Providers [x] YES  [ ] NO    Vital Signs Last 24 Hrs  T(C): 36.9 (25 Dec 2021 11:17), Max: 36.9 (25 Dec 2021 11:17)  T(F): 98.5 (25 Dec 2021 11:17), Max: 98.5 (25 Dec 2021 11:17)  HR: 55 (25 Dec 2021 11:17) (55 - 58)  BP: 159/72 (25 Dec 2021 11:17) (132/69 - 163/63)  BP(mean): --  RR: 18 (25 Dec 2021 11:17) (18 - 19)  SpO2: 92% (25 Dec 2021 11:17) (92% - 98%)  I&O's Summary    24 Dec 2021 07:01  -  25 Dec 2021 07:00  --------------------------------------------------------  IN: 210 mL / OUT: 775 mL / NET: -565 mL    25 Dec 2021 07:01  -  25 Dec 2021 16:34  --------------------------------------------------------  IN: 240 mL / OUT: 250 mL / NET: -10 mL        PHYSICAL EXAM:  GENERAL: NAD, well-developed, comfortable on NC  HEAD:  Atraumatic, Normocephalic  EYES: EOMI, PERRLA, conjunctiva and sclera clear  NECK: Supple, No JVD  CHEST/LUNG: Clear to auscultation bilaterally; No wheeze  HEART: Regular rate and rhythm; No murmurs, rubs, or gallops  ABDOMEN: Soft, Nontender, Nondistended; Bowel sounds present  : Cortes in place, danielle color urine, neg CVAT   Neuro: AAOx0, eyes open, awake but no meaningful interaction,   EXTREMITIES:  2+ Peripheral Pulses, No clubbing, cyanosis, +B/L UE edema.  LE b/l less edematous   SKIN: No rashes or lesions

## 2021-12-25 NOTE — DIETITIAN INITIAL EVALUATION ADULT. - CHIEF COMPLAINT
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 P/w respiratory distress 2/2 COVID PNA.

## 2021-12-25 NOTE — DIETITIAN INITIAL EVALUATION ADULT. - REASON INDICATOR FOR ASSESSMENT
Nutrition consult warranted for "Pressure Injury Stage 2 or >"  Source: EMR, Team; Pt disoriented/confused

## 2021-12-26 NOTE — PROGRESS NOTE ADULT - SUBJECTIVE AND OBJECTIVE BOX
SUBJECTIVE / OVERNIGHT EVENTS:  remain stable on NC  comfortable  no cp, no sob, no n/v/d. no abdominal pain.  no headache, no dizziness.   poor historian     --------------------------------------------------------------------------------------------  LABS:                        13.6   13.61 )-----------( 235      ( 26 Dec 2021 06:04 )             42.5         137  |  104  |  46<H>  ----------------------------<  174<H>  4.2   |  17<L>  |  1.35<H>    Ca    8.4      26 Dec 2021 06:04  Phos  2.9       Mg     1.8         TPro  6.2  /  Alb  2.3<L>  /  TBili  0.8  /  DBili  x   /  AST  110<H>  /  ALT  104<H>  /  AlkPhos  147<H>        CAPILLARY BLOOD GLUCOSE      POCT Blood Glucose.: 271 mg/dL (26 Dec 2021 16:53)  POCT Blood Glucose.: 250 mg/dL (26 Dec 2021 11:48)  POCT Blood Glucose.: 207 mg/dL (26 Dec 2021 07:41)  POCT Blood Glucose.: 190 mg/dL (25 Dec 2021 21:15)        Urinalysis Basic - ( 26 Dec 2021 06:04 )    Color: Yellow / Appearance: Slightly Turbid / S.025 / pH: x  Gluc: x / Ketone: Negative  / Bili: Negative / Urobili: Negative   Blood: x / Protein: 30 mg/dL / Nitrite: Negative   Leuk Esterase: Negative / RBC: 13 /hpf / WBC 12 /HPF   Sq Epi: x / Non Sq Epi: 8 /hpf / Bacteria: Few        RADIOLOGY & ADDITIONAL TESTS:    Imaging Personally Reviewed:  [x] YES  [ ] NO    Consultant(s) Notes Reviewed:  [x] YES  [ ] NO    MEDICATIONS  (STANDING):  aMIOdarone    Tablet 200 milliGRAM(s) Oral daily  apixaban 2.5 milliGRAM(s) Oral every 12 hours  ascorbic acid 500 milliGRAM(s) Oral daily  aspirin enteric coated 81 milliGRAM(s) Oral daily  cholecalciferol 2000 Unit(s) Oral daily  dexAMETHasone  Injectable 6 milliGRAM(s) IV Push daily  dextrose 40% Gel 15 Gram(s) Oral once  dextrose 5%. 1000 milliLiter(s) (50 mL/Hr) IV Continuous <Continuous>  dextrose 5%. 1000 milliLiter(s) (100 mL/Hr) IV Continuous <Continuous>  dextrose 50% Injectable 25 Gram(s) IV Push once  dextrose 50% Injectable 12.5 Gram(s) IV Push once  escitalopram 15 milliGRAM(s) Oral daily  finasteride 5 milliGRAM(s) Oral daily  glucagon  Injectable 1 milliGRAM(s) IntraMuscular once  insulin lispro (ADMELOG) corrective regimen sliding scale   SubCutaneous three times a day before meals  insulin lispro (ADMELOG) corrective regimen sliding scale   SubCutaneous at bedtime  latanoprost 0.005% Ophthalmic Solution 1 Drop(s) Both EYES at bedtime  multivitamin 1 Tablet(s) Oral daily  simvastatin 20 milliGRAM(s) Oral at bedtime  tamsulosin 0.4 milliGRAM(s) Oral at bedtime  thiamine 100 milliGRAM(s) Oral daily    MEDICATIONS  (PRN):  acetaminophen     Tablet .. 650 milliGRAM(s) Oral every 4 hours PRN Temp greater or equal to 38.5C (101.3F)  ALBUTerol    90 MICROgram(s) HFA Inhaler 2 Puff(s) Inhalation every 6 hours PRN Shortness of Breath and/or Wheezing  guaifenesin/dextromethorphan Oral Liquid 10 milliLiter(s) Oral every 4 hours PRN Cough      Care Discussed with Consultants/Other Providers [x] YES  [ ] NO    Vital Signs Last 24 Hrs  T(C): 37.1 (26 Dec 2021 10:45), Max: 37.1 (26 Dec 2021 05:12)  T(F): 98.8 (26 Dec 2021 10:45), Max: 98.8 (26 Dec 2021 10:45)  HR: 76 (26 Dec 2021 10:45) (60 - 76)  BP: 99/64 (26 Dec 2021 10:45) (99/64 - 151/74)  BP(mean): --  RR: 18 (26 Dec 2021 10:45) (18 - 18)  SpO2: 94% (26 Dec 2021 10:45) (94% - 94%)  I&O's Summary    25 Dec 2021 07:01  -  26 Dec 2021 07:00  --------------------------------------------------------  IN: 240 mL / OUT: 550 mL / NET: -310 mL    26 Dec 2021 07:01  -  26 Dec 2021 20:21  --------------------------------------------------------  IN: 0 mL / OUT: 250 mL / NET: -250 mL      PHYSICAL EXAM:  GENERAL: NAD, well-developed, comfortable on NC  HEAD:  Atraumatic, Normocephalic  EYES: EOMI, PERRLA, conjunctiva and sclera clear  NECK: Supple, No JVD  CHEST/LUNG: Clear to auscultation bilaterally; No wheeze  HEART: Regular rate and rhythm; No murmurs, rubs, or gallops  ABDOMEN: Soft, Nontender, Nondistended; Bowel sounds present  : Cortes in place, danielle color urine, neg CVAT   Neuro: AAOx0, eyes open, awake but no meaningful interaction,   EXTREMITIES:  2+ Peripheral Pulses, No clubbing, cyanosis, +B/L UE edema.  LE b/l less edematous   SKIN: No rashes or lesions

## 2021-12-27 NOTE — PHYSICAL THERAPY INITIAL EVALUATION ADULT - PERTINENT HX OF CURRENT PROBLEM, REHAB EVAL
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 Per daughter, pt's HHA noted that patient was breathing heavy on day of admission. Pt has been gurgling during this time, pt has been having chest congestion for the past month, intermittent coughing with white sputum, occasional dysphagia with aspiration. CONT....

## 2021-12-27 NOTE — ADVANCED PRACTICE NURSE CONSULT - REASON FOR CONSULT
Requested by staff to assess skin status of pt a/w a pressure injury. 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   Per daughter ,  patients home attendant noted that patient was breathing heavy on day of admission.  Patient has been gurgling during this time. Per family , patient has been having chest congestion for the past month , intermittent coughing with white sputum, occasional dysphagia with aspiration. There were no reported fever or chills. Per family , patient is bedbound , and has been sleeping most of the day for the past few months, with minimal interaction, although at times does perk up and may communicate.  Family was also concerned  about bleeding in the landeros cath , which has been occurring intermittently since it was exchanged on 12/15.

## 2021-12-27 NOTE — PROGRESS NOTE ADULT - NSPROGADDITIONALINFOA_GEN_ALL_CORE
d/w pt and NP Elvia.  will speak with the daughter again later today.     - Dr. ALIX Sharif ([facility])  - [phone] d/w pt and NP Elvia.  discussed with the daughter Aurelia, plan updated.     - Dr. ALIX Sharif ([facility])  - [phone]

## 2021-12-27 NOTE — PHYSICAL THERAPY INITIAL EVALUATION ADULT - PRECAUTIONS/LIMITATIONS, REHAB EVAL
Per family, patient is bedbound, and has been sleeping most of the day for the past few months, with minimal interaction, although at times does perk up and may communicate. Per family, patient is bedbound, and has been sleeping most of the day for the past few months, with minimal interaction, although at times does perk up and may communicate./fall precautions

## 2021-12-27 NOTE — PROGRESS NOTE ADULT - ASSESSMENT
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   Per daughter ,patients home attendant noted that patient was breathing heavy on day of admission. Per family , patient has been having chest congestion for the past month , intermittent coughing with white sputum, occasional dysphagia  patient is bedbound , and has been sleeping most of the day for the past few months, with minimal interaction, although at times does perk up and may communicate      Covid (+):    - Pt with low grade temp on admission 100F, WBC 11.  Initially requiring 4L NC.  CTA chest no PE, (+) peripheral GGOs and b/l endobronchial secretions.  Agree with dexamethasone 6mg IV qdaily x 10 days.  s/p landeros placement for urinary retention.   - Maintain oxygenation >90%.  Wean O2 as tolerated  - Check inflammatory markers  - Pt on home AC, apixiban.  No PE on CTA chest.  - Do not suspect superimposed bacterial infection.  Observe off abx for now.    will start remdesivir given pt's gGFR >30, liver enzymes <10x upper limit of normal    Henrry Mejia M.D.  [facility], Division of Infectious Diseases  [phone]  After 5pm on weekdays and all day on weekends - please call [phone]

## 2021-12-27 NOTE — PROGRESS NOTE ADULT - SUBJECTIVE AND OBJECTIVE BOX
[facility], Division of Infectious Diseases  YOCASTA Pedraza Y. Patel, S. Shah, G. Casimir  [phone]  ([phone] - weekdays after 5pm and weekends)    Name: BRIA COREY  Age/Gender: 89y Male  MRN: [id number]    Interval History:  Patient seen this morning.   Notes reviewed.   Afebrile.     Allergies: Cipro (Hives)  fish (Unknown)      Objective:  Vitals:   T(F): 97.4 (21 @ 12:06), Max: 98 (21 @ 04:43)  HR: 71 (21 @ 12:06) (71 - 78)  BP: 117/66 (21 @ 12:06) (110/62 - 127/71)  RR: 18 (21 @ 12:06) (18 - 19)  SpO2: 90% (21 @ 12:06) (90% - 92%)  Physical Examination:  General: ill appearing  HEENT: anicteric  Cardio: S1, S2 present, normal rate  Resp: on venturi mask, crackles  Abd: soft, ND  Ext: no LE edema  Skin: warm, dry, no visible rash   Lines:    Laboratory Studies:  CBC:                       13.6   13.61 )-----------( 235      ( 26 Dec 2021 06:04 )             42.5     WBC Trend:  13.61 21 @ 06:04  10.56 21 @ 06:34  7.20 21 @ 05:57  11.97 21 @ 16:22    CMP:     137  |  104  |  46<H>  ----------------------------<  174<H>  4.2   |  17<L>  |  1.35<H>    Ca    8.4      26 Dec 2021 06:04  Phos  2.9       Mg     1.8         TPro  6.2  /  Alb  2.3<L>  /  TBili  0.8  /  DBili  x   /  AST  110<H>  /  ALT  104<H>  /  AlkPhos  147<H>  12-26      LIVER FUNCTIONS - ( 26 Dec 2021 06:04 )  Alb: 2.3 g/dL / Pro: 6.2 g/dL / ALK PHOS: 147 U/L / ALT: 104 U/L / AST: 110 U/L / GGT: x             Urinalysis Basic - ( 26 Dec 2021 06:04 )    Color: Yellow / Appearance: Slightly Turbid / S.025 / pH: x  Gluc: x / Ketone: Negative  / Bili: Negative / Urobili: Negative   Blood: x / Protein: 30 mg/dL / Nitrite: Negative   Leuk Esterase: Negative / RBC: 13 /hpf / WBC 12 /HPF   Sq Epi: x / Non Sq Epi: 8 /hpf / Bacteria: Few      Microbiology: reviewed     Culture - Urine (collected 21 @ 09:30)  Source: Clean Catch Clean Catch (Midstream)  Final Report (21 @ 07:40):    <10,000 CFU/mL Normal Urogenital Amber    Culture - Blood (collected 21 @ 18:53)  Source: .Blood Blood-Peripheral  Preliminary Report (21 @ 19:02):    No growth to date.    Culture - Blood (collected 21 @ 18:53)  Source: .Blood Blood-Peripheral  Preliminary Report (21 @ 19:02):    No growth to date.      Radiology: reviewed     Medications:  acetaminophen     Tablet .. 650 milliGRAM(s) Oral every 4 hours PRN  ALBUTerol    90 MICROgram(s) HFA Inhaler 2 Puff(s) Inhalation every 6 hours PRN  aMIOdarone    Tablet 200 milliGRAM(s) Oral daily  apixaban 2.5 milliGRAM(s) Oral every 12 hours  ascorbic acid 500 milliGRAM(s) Oral daily  aspirin enteric coated 81 milliGRAM(s) Oral daily  cholecalciferol 2000 Unit(s) Oral daily  dexAMETHasone  Injectable 6 milliGRAM(s) IV Push daily  dextrose 40% Gel 15 Gram(s) Oral once  dextrose 5%. 1000 milliLiter(s) IV Continuous <Continuous>  dextrose 5%. 1000 milliLiter(s) IV Continuous <Continuous>  dextrose 50% Injectable 25 Gram(s) IV Push once  dextrose 50% Injectable 12.5 Gram(s) IV Push once  escitalopram 15 milliGRAM(s) Oral daily  finasteride 5 milliGRAM(s) Oral daily  furosemide    Tablet 20 milliGRAM(s) Oral daily  glucagon  Injectable 1 milliGRAM(s) IntraMuscular once  guaifenesin/dextromethorphan Oral Liquid 10 milliLiter(s) Oral every 4 hours PRN  insulin lispro (ADMELOG) corrective regimen sliding scale   SubCutaneous three times a day before meals  insulin lispro (ADMELOG) corrective regimen sliding scale   SubCutaneous at bedtime  latanoprost 0.005% Ophthalmic Solution 1 Drop(s) Both EYES at bedtime  multivitamin 1 Tablet(s) Oral daily  simvastatin 20 milliGRAM(s) Oral at bedtime  tamsulosin 0.4 milliGRAM(s) Oral at bedtime  thiamine 100 milliGRAM(s) Oral daily    Antimicrobials:

## 2021-12-27 NOTE — ADVANCED PRACTICE NURSE CONSULT - RECOMMEDATIONS
Will recommend the followin. Sacrum: continue to monitor, Cavilon to periwound skin, Allevyn foam dressing- change every 3 days and prn for drainage/soiling  2. continue with turning and positioning  3. Continue with boots  4. Nutrition support as pt condition allows  Tx plan discussed with RN

## 2021-12-27 NOTE — PHYSICAL THERAPY INITIAL EVALUATION ADULT - ADDITIONAL COMMENTS
Pt unable to provide any social history. As per chart review, lives at home with 24/7 HHA and spouse. Pt is bed bound and requires assistance with all ADL's. Pt owns hospital bed & wheel chair.

## 2021-12-27 NOTE — ADVANCED PRACTICE NURSE CONSULT - ASSESSMENT
The pt was encountered on [facility]- Mr Baez is on Airborne Isolation as he is Covid + He is non-verbal at this time. staff have been assisting him with turning and positioning, he has Complete CAir boots in place to off-load his heels.  He is thin, contracted a nutrition consult notes that he has mild protein calorie malnutrition.  upon assessment, the pt presents with a wound that encompasses the sacrum and b/l buttocks. the wound measures 9.5cmx 11cm x0.2cm. the tissue type is approximately 30% dark black tissue and 70% moist, red maroon tissue. The periwound skin presented with a deep purple tone- will classify this as a deep tissue injury that is evolving. Staff had applied a foam dressing - will recommend to continue with same.

## 2021-12-27 NOTE — PROGRESS NOTE ADULT - SUBJECTIVE AND OBJECTIVE BOX
SUBJECTIVE / OVERNIGHT EVENTS:  respiratory status deteriorating as well as his mental status  more sleepy  NC to ventimask.   empiric Zosyn IV started to cover superimposed aspiration. check blood cultures  repeat CXR and inflamamtory markers  check procalcitonin  ABG if more lethargic.   mild Cr elevation on Low dose Lasix 20 mg qdaily  already on full AC (Eliquis) so doubt superimposed PE.         --------------------------------------------------------------------------------------------  LABS:                        12.6   17.33 )-----------( 244      ( 27 Dec 2021 14:40 )             35.7         140  |  107  |  62<H>  ----------------------------<  246<H>  4.1   |  18<L>  |  1.85<H>    Ca    8.8      27 Dec 2021 14:40  Phos  2.9       Mg     1.8         TPro  6.1  /  Alb  2.2<L>  /  TBili  0.8  /  DBili  x   /  AST  69<H>  /  ALT  83<H>  /  AlkPhos  139<H>        CAPILLARY BLOOD GLUCOSE      POCT Blood Glucose.: 187 mg/dL (27 Dec 2021 16:46)  POCT Blood Glucose.: 232 mg/dL (27 Dec 2021 11:41)  POCT Blood Glucose.: 244 mg/dL (27 Dec 2021 08:05)  POCT Blood Glucose.: 209 mg/dL (26 Dec 2021 21:08)        Urinalysis Basic - ( 26 Dec 2021 06:04 )    Color: Yellow / Appearance: Slightly Turbid / S.025 / pH: x  Gluc: x / Ketone: Negative  / Bili: Negative / Urobili: Negative   Blood: x / Protein: 30 mg/dL / Nitrite: Negative   Leuk Esterase: Negative / RBC: 13 /hpf / WBC 12 /HPF   Sq Epi: x / Non Sq Epi: 8 /hpf / Bacteria: Few        RADIOLOGY & ADDITIONAL TESTS:    Imaging Personally Reviewed:  [x] YES  [ ] NO    Consultant(s) Notes Reviewed:  [x] YES  [ ] NO    MEDICATIONS  (STANDING):  aMIOdarone    Tablet 200 milliGRAM(s) Oral daily  apixaban 2.5 milliGRAM(s) Oral every 12 hours  ascorbic acid 500 milliGRAM(s) Oral daily  aspirin enteric coated 81 milliGRAM(s) Oral daily  cholecalciferol 2000 Unit(s) Oral daily  dexAMETHasone  Injectable 6 milliGRAM(s) IV Push daily  dextrose 40% Gel 15 Gram(s) Oral once  dextrose 5%. 1000 milliLiter(s) (50 mL/Hr) IV Continuous <Continuous>  dextrose 5%. 1000 milliLiter(s) (100 mL/Hr) IV Continuous <Continuous>  dextrose 50% Injectable 25 Gram(s) IV Push once  dextrose 50% Injectable 12.5 Gram(s) IV Push once  escitalopram 15 milliGRAM(s) Oral daily  finasteride 5 milliGRAM(s) Oral daily  furosemide    Tablet 20 milliGRAM(s) Oral daily  glucagon  Injectable 1 milliGRAM(s) IntraMuscular once  insulin lispro (ADMELOG) corrective regimen sliding scale   SubCutaneous three times a day before meals  insulin lispro (ADMELOG) corrective regimen sliding scale   SubCutaneous at bedtime  latanoprost 0.005% Ophthalmic Solution 1 Drop(s) Both EYES at bedtime  multivitamin 1 Tablet(s) Oral daily  piperacillin/tazobactam IVPB. 3.375 Gram(s) IV Intermittent once  piperacillin/tazobactam IVPB.. 3.375 Gram(s) IV Intermittent every 12 hours  remdesivir  IVPB   IV Intermittent   remdesivir  IVPB 200 milliGRAM(s) IV Intermittent every 24 hours  simvastatin 20 milliGRAM(s) Oral at bedtime  tamsulosin 0.4 milliGRAM(s) Oral at bedtime  thiamine 100 milliGRAM(s) Oral daily    MEDICATIONS  (PRN):  acetaminophen     Tablet .. 650 milliGRAM(s) Oral every 4 hours PRN Temp greater or equal to 38.5C (101.3F)  ALBUTerol    90 MICROgram(s) HFA Inhaler 2 Puff(s) Inhalation every 6 hours PRN Shortness of Breath and/or Wheezing  guaifenesin/dextromethorphan Oral Liquid 10 milliLiter(s) Oral every 4 hours PRN Cough      Care Discussed with Consultants/Other Providers [x] YES  [ ] NO    Vital Signs Last 24 Hrs  T(C): 36.3 (27 Dec 2021 12:06), Max: 36.7 (27 Dec 2021 04:43)  T(F): 97.4 (27 Dec 2021 12:06), Max: 98 (27 Dec 2021 04:43)  HR: 71 (27 Dec 2021 12:06) (71 - 78)  BP: 120/75 (27 Dec 2021 16:58) (110/62 - 127/71)  BP(mean): --  RR: 20 (27 Dec 2021 16:58) (18 - 20)  SpO2: 88% (27 Dec 2021 16:58) (88% - 92%)  I&O's Summary    26 Dec 2021 07:01  -  27 Dec 2021 07:00  --------------------------------------------------------  IN: 0 mL / OUT: 450 mL / NET: -450 mL        PHYSICAL EXAM:  GENERAL: NAD, well-developed, NC --> ventimask.   HEAD:  Atraumatic, Normocephalic  EYES: EOMI, PERRLA, conjunctiva and sclera clear  NECK: Supple, No JVD  CHEST/LUNG: mild decrease breath sounds bilaterally; No wheeze   HEART: Regular rate and rhythm; No murmurs, rubs, or gallops  ABDOMEN: Soft, Nontender, Nondistended; Bowel sounds present  : Cortes in place, danielle color urine, neg CVAT   Neuro: AAOx0, eyes open, awake but no meaningful interaction,   EXTREMITIES:  2+ Peripheral Pulses, No clubbing, cyanosis, +B/L UE edema.  LE b/l less edematous   SKIN: No rashes or lesions

## 2021-12-27 NOTE — PHYSICAL THERAPY INITIAL EVALUATION ADULT - IMPAIRMENTS FOUND, PT EVAL
Multiple attempts to reach patient but she did answer today. She states that she has a bone that is sticking out of her jaw in her mouth and would like to have this evaluated with Dr. Jasso.     Offered patient multiple appointments but she needs to work around her work schedule so we settled on 12/2 at 11:15am. Patient agreeable and was encouraged to call with further questions or concerns    Kay Fox, RN, BSN     muscle strength/ROM

## 2021-12-28 NOTE — PROGRESS NOTE ADULT - NSPROGADDITIONALINFOA_GEN_ALL_CORE
Palomo Henry will be covering for me starting 12/29/21. He can be reached at  if needed.     - Dr. ALIX Sharif ([facility])  - [phone]

## 2021-12-28 NOTE — PROGRESS NOTE ADULT - SUBJECTIVE AND OBJECTIVE BOX
SUBJECTIVE / OVERNIGHT EVENTS:  mental status more awake  comfortable on hi-flow  noted MRSA bacteremia  vanco x 1  received Toci last night.       --------------------------------------------------------------------------------------------  LABS:                        12.9   13.75 )-----------( 257      ( 28 Dec 2021 06:14 )             36.9     12-28    141  |  109<H>  |  69<H>  ----------------------------<  203<H>  4.1   |  18<L>  |  2.05<H>    Ca    8.6      28 Dec 2021 06:12    TPro  6.1  /  Alb  2.0<L>  /  TBili  0.6  /  DBili  x   /  AST  75<H>  /  ALT  77<H>  /  AlkPhos  125<H>  12-28      CAPILLARY BLOOD GLUCOSE      POCT Blood Glucose.: 216 mg/dL (28 Dec 2021 16:37)  POCT Blood Glucose.: 242 mg/dL (28 Dec 2021 12:23)  POCT Blood Glucose.: 202 mg/dL (28 Dec 2021 07:52)  POCT Blood Glucose.: 169 mg/dL (27 Dec 2021 21:28)            RADIOLOGY & ADDITIONAL TESTS:    Imaging Personally Reviewed:  [x] YES  [ ] NO    Consultant(s) Notes Reviewed:  [x] YES  [ ] NO    MEDICATIONS  (STANDING):  aMIOdarone    Tablet 200 milliGRAM(s) Oral daily  apixaban 2.5 milliGRAM(s) Oral every 12 hours  ascorbic acid 500 milliGRAM(s) Oral daily  aspirin enteric coated 81 milliGRAM(s) Oral daily  cholecalciferol 2000 Unit(s) Oral daily  dexAMETHasone  Injectable 6 milliGRAM(s) IV Push daily  dextrose 40% Gel 15 Gram(s) Oral once  dextrose 5%. 1000 milliLiter(s) (50 mL/Hr) IV Continuous <Continuous>  dextrose 5%. 1000 milliLiter(s) (100 mL/Hr) IV Continuous <Continuous>  dextrose 50% Injectable 25 Gram(s) IV Push once  dextrose 50% Injectable 12.5 Gram(s) IV Push once  escitalopram 15 milliGRAM(s) Oral daily  finasteride 5 milliGRAM(s) Oral daily  glucagon  Injectable 1 milliGRAM(s) IntraMuscular once  insulin lispro (ADMELOG) corrective regimen sliding scale   SubCutaneous three times a day before meals  insulin lispro (ADMELOG) corrective regimen sliding scale   SubCutaneous at bedtime  latanoprost 0.005% Ophthalmic Solution 1 Drop(s) Both EYES at bedtime  multivitamin 1 Tablet(s) Oral daily  piperacillin/tazobactam IVPB.. 3.375 Gram(s) IV Intermittent every 12 hours  remdesivir  IVPB   IV Intermittent   remdesivir  IVPB 100 milliGRAM(s) IV Intermittent every 24 hours  simvastatin 20 milliGRAM(s) Oral at bedtime  tamsulosin 0.4 milliGRAM(s) Oral at bedtime  thiamine 100 milliGRAM(s) Oral daily  vancomycin  IVPB 1000 milliGRAM(s) IV Intermittent once    MEDICATIONS  (PRN):  acetaminophen     Tablet .. 650 milliGRAM(s) Oral every 4 hours PRN Temp greater or equal to 38.5C (101.3F)  ALBUTerol    90 MICROgram(s) HFA Inhaler 2 Puff(s) Inhalation every 6 hours PRN Shortness of Breath and/or Wheezing  guaifenesin/dextromethorphan Oral Liquid 10 milliLiter(s) Oral every 4 hours PRN Cough      Care Discussed with Consultants/Other Providers [x] YES  [ ] NO    Vital Signs Last 24 Hrs  T(C): 36.4 (28 Dec 2021 11:07), Max: 36.4 (27 Dec 2021 23:55)  T(F): 97.5 (28 Dec 2021 11:07), Max: 97.6 (27 Dec 2021 23:55)  HR: 68 (28 Dec 2021 15:12) (60 - 74)  BP: 122/64 (28 Dec 2021 11:07) (122/64 - 147/75)  BP(mean): --  RR: 20 (28 Dec 2021 15:12) (18 - 20)  SpO2: 95% (28 Dec 2021 15:12) (90% - 95%)  I&O's Summary    27 Dec 2021 07:01  -  28 Dec 2021 07:00  --------------------------------------------------------  IN: 0 mL / OUT: 200 mL / NET: -200 mL    28 Dec 2021 07:01  -  28 Dec 2021 20:35  --------------------------------------------------------  IN: 0 mL / OUT: 150 mL / NET: -150 mL      PHYSICAL EXAM:  GENERAL: NAD, well-developed, on hi-flow  HEAD:  Atraumatic, Normocephalic  EYES: EOMI, PERRLA, conjunctiva and sclera clear  NECK: Supple, No JVD  CHEST/LUNG: mild decrease breath sounds bilaterally; No wheeze   HEART: Regular rate and rhythm; No murmurs, rubs, or gallops  ABDOMEN: Soft, Nontender, Nondistended; Bowel sounds present  : Cortes in place, danielle color urine, neg CVAT   Neuro: AAOx0, eyes open, awake but no meaningful interaction,   EXTREMITIES:  2+ Peripheral Pulses, No clubbing, cyanosis, +B/L UE edema.  LE b/l less edematous   SKIN: No rashes or lesions

## 2021-12-28 NOTE — PROGRESS NOTE ADULT - ASSESSMENT
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   Per daughter ,patients home attendant noted that patient was breathing heavy on day of admission. Per family , patient has been having chest congestion for the past month , intermittent coughing with white sputum, occasional dysphagia  patient is bedbound , and has been sleeping most of the day for the past few months, with minimal interaction, although at times does perk up and may communicate    Covid PNA, acute hypoxic resp failure  - Pt with low grade temp on admission 100F, WBC 11.  Initially requiring 4L NC.  CTA chest no PE, (+) peripheral GGOs and b/l endobronchial secretions.  s/p landeros placement for urinary retention  - Pt on home AC, apixiban.  No PE on CTA chest.  c/w remdesivir x 5 day course  c/w dexamethasone 6mg IV qdaily x 10 days  pt w/ s/p tocilizumab 12/27  CXR w/ possible LLL infiltrate  c/w empiric zosyn x 5 day course for possible superimposed aspiration PNA  Wean O2 as tolerated  monitor inflammatory markers    Hernry Mejia M.D.  [facility], Division of Infectious Diseases  [phone]  After 5pm on weekdays and all day on weekends - please call [phone]

## 2021-12-28 NOTE — CONSULT NOTE ADULT - SUBJECTIVE AND OBJECTIVE BOX
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   Per daughter ,  patients home attendant noted that patient was breathing heavy on day of admission.  Patient has been gurgling during this time. Per family , patient has been having chest congestion for the past month , intermittent coughing with white sputum, occasional dysphagia with aspiration. There were no reported fever or chills. Per family , patient is bedbound , and has been sleeping most of the day for the past few months, with minimal interaction, although at times does perk up and may communicate.  Family was also concerned  about bleeding in the landeros cath , which has been occurring intermittently since it was exchanged on 12/15.       PAST MEDICAL & SURGICAL HISTORY:  Type 2 diabetes mellitus with other specified complication, unspecified whether long term insulin use    Cerebrovascular accident (CVA), unspecified mechanism    Ventricular arrhythmia  On Amiodarone    Urinary tract infection associated with indwelling urethral catheter, initial encounter    History of appendectomy    Perforated bowel  2003/[facility]    History of cataract extraction, unspecified laterality    Ganglion of left wrist      Allergies    Cipro (Hives)  fish (Unknown)    Intolerances    Pollen, hayfever (Unknown)    FAMILY HISTORY:  FH: myocardial infarction  parents     Social History:  Social History (marital status, living situation, occupation, tobacco use, alcohol and drug use, and sexual history): The patient ambulates with a walker.  He denies tobacco or alcohol use.  He has a home health aide.     Tobacco Screening:  · Core Measure Site	No       Review of Systems:  Review of Systems: Full ROS reviewed: Patient unable to provide medical history 2/2 to medical condition      Allergies and Intolerances:        Allergies:  	Cipro: Drug, Hives  	fish: Food, Unknown       Intolerances:  	Pollen, hayfever: Miscellaneous, Unknown, Pollen, hayfever      Medications:  MEDICATIONS  (STANDING):  aMIOdarone    Tablet 200 milliGRAM(s) Oral daily  apixaban 2.5 milliGRAM(s) Oral every 12 hours  ascorbic acid 500 milliGRAM(s) Oral daily  aspirin enteric coated 81 milliGRAM(s) Oral daily  cholecalciferol 2000 Unit(s) Oral daily  dexAMETHasone  Injectable 6 milliGRAM(s) IV Push daily  dextrose 40% Gel 15 Gram(s) Oral once  dextrose 5%. 1000 milliLiter(s) (50 mL/Hr) IV Continuous <Continuous>  dextrose 5%. 1000 milliLiter(s) (100 mL/Hr) IV Continuous <Continuous>  dextrose 50% Injectable 25 Gram(s) IV Push once  dextrose 50% Injectable 12.5 Gram(s) IV Push once  escitalopram 15 milliGRAM(s) Oral daily  finasteride 5 milliGRAM(s) Oral daily  furosemide    Tablet 20 milliGRAM(s) Oral daily  glucagon  Injectable 1 milliGRAM(s) IntraMuscular once  insulin lispro (ADMELOG) corrective regimen sliding scale   SubCutaneous three times a day before meals  insulin lispro (ADMELOG) corrective regimen sliding scale   SubCutaneous at bedtime  latanoprost 0.005% Ophthalmic Solution 1 Drop(s) Both EYES at bedtime  multivitamin 1 Tablet(s) Oral daily  piperacillin/tazobactam IVPB.. 3.375 Gram(s) IV Intermittent every 12 hours  remdesivir  IVPB   IV Intermittent   remdesivir  IVPB 100 milliGRAM(s) IV Intermittent every 24 hours  simvastatin 20 milliGRAM(s) Oral at bedtime  tamsulosin 0.4 milliGRAM(s) Oral at bedtime  thiamine 100 milliGRAM(s) Oral daily    MEDICATIONS  (PRN):  acetaminophen     Tablet .. 650 milliGRAM(s) Oral every 4 hours PRN Temp greater or equal to 38.5C (101.3F)  ALBUTerol    90 MICROgram(s) HFA Inhaler 2 Puff(s) Inhalation every 6 hours PRN Shortness of Breath and/or Wheezing  guaifenesin/dextromethorphan Oral Liquid 10 milliLiter(s) Oral every 4 hours PRN Cough    Vital Signs Last 24 Hrs  T(C): 36.4 (28 Dec 2021 11:07), Max: 36.4 (27 Dec 2021 23:55)  T(F): 97.5 (28 Dec 2021 11:07), Max: 97.6 (27 Dec 2021 23:55)  HR: 69 (28 Dec 2021 11:07) (60 - 74)  BP: 122/64 (28 Dec 2021 11:07) (117/66 - 147/75)  BP(mean): --  RR: 18 (28 Dec 2021 11:07) (18 - 20)  SpO2: 90% (28 Dec 2021 11:07) (88% - 94%)    ABG - ( 27 Dec 2021 17:37 )  pH, Arterial: 7.43  pH, Blood: x     /  pCO2: 30    /  pO2: 64    / HCO3: 20    / Base Excess: -3.3  /  SaO2: 93.4      12-27 @ 07:01  -  12-28 @ 07:00  --------------------------------------------------------  IN: 0 mL / OUT: 200 mL / NET: -200 mL      LABS:                        12.9   13.75 )-----------( 257      ( 28 Dec 2021 06:14 )             36.9     12-28    141  |  109<H>  |  69<H>  ----------------------------<  203<H>  4.1   |  18<L>  |  2.05<H>    Ca    8.6      28 Dec 2021 06:12    TPro  6.1  /  Alb  2.0<L>  /  TBili  0.6  /  DBili  x   /  AST  75<H>  /  ALT  77<H>  /  AlkPhos  125<H>  12-28    Procalcitonin, Serum: 0.66 ng/mL (12-28-21 @ 06:12)  Procalcitonin, Serum: 0.65 ng/mL (12-28-21 @ 06:12)    CULTURES:  Culture Results:   <10,000 CFU/mL Normal Urogenital Amber (12-26 @ 09:30)  Culture Results:   No growth to date. (12-23 @ 18:53)  Culture Results:   No growth to date. (12-23 @ 18:53)      Physical Examination:    General: No acute distress.      HEENT: Pupils equal, reactive to light.  Symmetric.    PULM: Clear to auscultation bilaterally, no significant sputum production    CVS: Regular rate and rhythm, no murmurs, rubs, or gallops    ABD: Soft, nondistended, nontender, normoactive bowel sounds, no masses    EXT: No edema, nontender    SKIN: Warm and well perfused, no rashes noted.    NEURO: Alert, oriented, interactive, nonfocal    RADIOLOGY REVIEWED PERSONALLY  CXR:    PROCEDURE DATE:  12/27/2021        INTERPRETATION:  EXAMINATION: XR CHEST URGENT    CLINICAL INDICATION: Dyspnea.    TECHNIQUE: Single frontal, portable view of the chest was obtained.    COMPARISON: CT chest 12/23/2021. Chest x-ray 12/23/2021.    FINDINGS:    The heart appears enlarged. Aortic knob calcifications.    Low lung volumes. Redemonstrated left lower lung field opacity. Bilateral   peripheral ground glass opacities are better evaluated on CT chest dated   12/23/2021. Mildly increased pulmonary vascular markings.    No definite pleural effusions. No pneumothorax.    IMPRESSION:  Redemonstrated left lower lung field opacity and bilateral peripheral   groundglass opacities. Mild pulmonary congestion.    CTA chest:    PROCEDURE DATE:  12/23/2021      INTERPRETATION:  Reason for Exam: Shortness of breath. chest pain.    CTA of the chest was performed from the thoracic inlet to the level of   the adrenal glands following IV contrast injection of  80 cc of Omnipaque   350. No immediate complications were reported.  MIP images were also   created and reviewed.    Comparison: Chest xray of same date.    Tubes/Lines: None    Mediastinum and Heart: Aorta and pulmonary arteries are normal in size. A   left lower pole thyroid nodule is seen measures 6 mm. No lymphadenopathy.   No pericardial effusion.    Lungs, Pleura, and Airways: There is no pulmonary embolus. Peripheral   ground glass opacities. Bilateral endobronchial secretions noted.    Visualized Abdomen: Unremarkable.    Bones and soft tissues: Unremarkable.    IMPRESSION:    No pulmonary embolus.    Peripheral ground glass opacities noted bilaterally, which may represent   COVID in the right clinical setting.    Bilateral endobronchial secretions noted.    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   Per daughter ,  patients home attendant noted that patient was breathing heavy on day of admission.  Patient has been gurgling during this time. Per family , patient has been having chest congestion for the past month , intermittent coughing with white sputum, occasional dysphagia with aspiration. There were no reported fever or chills. Per family , patient is bedbound , and has been sleeping most of the day for the past few months, with minimal interaction, although at times does perk up and may communicate.  Family was also concerned  about bleeding in the landeros cath , which has been occurring intermittently since it was exchanged on 12/15.     Asked to evaluate for increasing FIO2 requriements: patient transitioned to HiFlo nasal cannula and is s/p Tociluzamib 12/27 for respiratory deterioration. At time of visit was sleeping comfortably on 75% HiFlo: rousable and interactive.   He is currently on Remdesevir and Decadron 6 mg IV.  Zosyn started for possbile superimposed aspiration in setting of prior CVA    PAST MEDICAL & SURGICAL HISTORY:  Type 2 diabetes mellitus with other specified complication, unspecified whether long term insulin use    Cerebrovascular accident (CVA), unspecified mechanism    Ventricular arrhythmia  On Amiodarone    Urinary tract infection associated with indwelling urethral catheter, initial encounter    History of appendectomy    Perforated bowel  2003/[facility]    History of cataract extraction, unspecified laterality    Ganglion of left wrist      Allergies    Cipro (Hives)  fish (Unknown)    Intolerances    Pollen, hayfever (Unknown)    FAMILY HISTORY:  FH: myocardial infarction  parents     Social History:  Social History (marital status, living situation, occupation, tobacco use, alcohol and drug use, and sexual history): The patient ambulates with a walker.  He denies tobacco or alcohol use.  He has a home health aide.     Tobacco Screening:  · Core Measure Site	No       Review of Systems:  Review of Systems: Full ROS reviewed: Patient unable to provide medical history 2/2 to medical condition      Allergies and Intolerances:        Allergies:  	Cipro: Drug, Hives  	fish: Food, Unknown       Intolerances:  	Pollen, hayfever: Miscellaneous, Unknown, Pollen, hayfever      Medications:  MEDICATIONS  (STANDING):  aMIOdarone    Tablet 200 milliGRAM(s) Oral daily  apixaban 2.5 milliGRAM(s) Oral every 12 hours  ascorbic acid 500 milliGRAM(s) Oral daily  aspirin enteric coated 81 milliGRAM(s) Oral daily  cholecalciferol 2000 Unit(s) Oral daily  dexAMETHasone  Injectable 6 milliGRAM(s) IV Push daily  dextrose 40% Gel 15 Gram(s) Oral once  dextrose 5%. 1000 milliLiter(s) (50 mL/Hr) IV Continuous <Continuous>  dextrose 5%. 1000 milliLiter(s) (100 mL/Hr) IV Continuous <Continuous>  dextrose 50% Injectable 25 Gram(s) IV Push once  dextrose 50% Injectable 12.5 Gram(s) IV Push once  escitalopram 15 milliGRAM(s) Oral daily  finasteride 5 milliGRAM(s) Oral daily  furosemide    Tablet 20 milliGRAM(s) Oral daily  glucagon  Injectable 1 milliGRAM(s) IntraMuscular once  insulin lispro (ADMELOG) corrective regimen sliding scale   SubCutaneous three times a day before meals  insulin lispro (ADMELOG) corrective regimen sliding scale   SubCutaneous at bedtime  latanoprost 0.005% Ophthalmic Solution 1 Drop(s) Both EYES at bedtime  multivitamin 1 Tablet(s) Oral daily  piperacillin/tazobactam IVPB.. 3.375 Gram(s) IV Intermittent every 12 hours  remdesivir  IVPB   IV Intermittent   remdesivir  IVPB 100 milliGRAM(s) IV Intermittent every 24 hours  simvastatin 20 milliGRAM(s) Oral at bedtime  tamsulosin 0.4 milliGRAM(s) Oral at bedtime  thiamine 100 milliGRAM(s) Oral daily    MEDICATIONS  (PRN):  acetaminophen     Tablet .. 650 milliGRAM(s) Oral every 4 hours PRN Temp greater or equal to 38.5C (101.3F)  ALBUTerol    90 MICROgram(s) HFA Inhaler 2 Puff(s) Inhalation every 6 hours PRN Shortness of Breath and/or Wheezing  guaifenesin/dextromethorphan Oral Liquid 10 milliLiter(s) Oral every 4 hours PRN Cough    Vital Signs Last 24 Hrs  T(C): 36.4 (28 Dec 2021 11:07), Max: 36.4 (27 Dec 2021 23:55)  T(F): 97.5 (28 Dec 2021 11:07), Max: 97.6 (27 Dec 2021 23:55)  HR: 69 (28 Dec 2021 11:07) (60 - 74)  BP: 122/64 (28 Dec 2021 11:07) (117/66 - 147/75)  BP(mean): --  RR: 18 (28 Dec 2021 11:07) (18 - 20)  SpO2: 90% (28 Dec 2021 11:07) (88% - 94%)    ABG - ( 27 Dec 2021 17:37 )  pH, Arterial: 7.43  pH, Blood: x     /  pCO2: 30    /  pO2: 64    / HCO3: 20    / Base Excess: -3.3  /  SaO2: 93.4      12-27 @ 07:01  -  12-28 @ 07:00  --------------------------------------------------------  IN: 0 mL / OUT: 200 mL / NET: -200 mL      LABS:                        12.9   13.75 )-----------( 257      ( 28 Dec 2021 06:14 )             36.9     12-28    141  |  109<H>  |  69<H>  ----------------------------<  203<H>  4.1   |  18<L>  |  2.05<H>    Ca    8.6      28 Dec 2021 06:12    TPro  6.1  /  Alb  2.0<L>  /  TBili  0.6  /  DBili  x   /  AST  75<H>  /  ALT  77<H>  /  AlkPhos  125<H>  12-28    Procalcitonin, Serum: 0.66 ng/mL (12-28-21 @ 06:12)  Procalcitonin, Serum: 0.65 ng/mL (12-28-21 @ 06:12)    CULTURES:  Culture Results:   <10,000 CFU/mL Normal Urogenital Amber (12-26 @ 09:30)  Culture Results:   No growth to date. (12-23 @ 18:53)  Culture Results:   No growth to date. (12-23 @ 18:53)      Physical Examination:    General: Sleeping comfortably on HiFlo at 75% FIO2. No acute distress.      HEENT: Pupils equal, reactive to light.  Symmetric.    PULM: No sign wheeze or rhnchi    CVS: Regular rate and rhythm, no murmurs, rubs, or gallops    ABD: Soft, nondistended, nontender, normoactive bowel sounds, no masses    EXT: No edema, nontender    SKIN: Warm and well perfused, no rashes noted.    NEURO: Alert, oriented, interactive, nonfocal    RADIOLOGY REVIEWED PERSONALLY  CXR:    PROCEDURE DATE:  12/27/2021        INTERPRETATION:  EXAMINATION: XR CHEST URGENT    CLINICAL INDICATION: Dyspnea.    TECHNIQUE: Single frontal, portable view of the chest was obtained.    COMPARISON: CT chest 12/23/2021. Chest x-ray 12/23/2021.    FINDINGS:    The heart appears enlarged. Aortic knob calcifications.    Low lung volumes. Redemonstrated left lower lung field opacity. Bilateral   peripheral ground glass opacities are better evaluated on CT chest dated   12/23/2021. Mildly increased pulmonary vascular markings.    No definite pleural effusions. No pneumothorax.    IMPRESSION:  Redemonstrated left lower lung field opacity and bilateral peripheral   groundglass opacities. Mild pulmonary congestion.    CTA chest:    PROCEDURE DATE:  12/23/2021      INTERPRETATION:  Reason for Exam: Shortness of breath. chest pain.    CTA of the chest was performed from the thoracic inlet to the level of   the adrenal glands following IV contrast injection of  80 cc of Omnipaque   350. No immediate complications were reported.  MIP images were also   created and reviewed.    Comparison: Chest xray of same date.    Tubes/Lines: None    Mediastinum and Heart: Aorta and pulmonary arteries are normal in size. A   left lower pole thyroid nodule is seen measures 6 mm. No lymphadenopathy.   No pericardial effusion.    Lungs, Pleura, and Airways: There is no pulmonary embolus. Peripheral   ground glass opacities. Bilateral endobronchial secretions noted.    Visualized Abdomen: Unremarkable.    Bones and soft tissues: Unremarkable.    IMPRESSION:    No pulmonary embolus.    Peripheral ground glass opacities noted bilaterally, which may represent   COVID in the right clinical setting.    Bilateral endobronchial secretions noted.    TTE:

## 2021-12-28 NOTE — PROGRESS NOTE ADULT - PROBLEM SELECTOR PLAN 4
- his Cr is somewhat at his baseline.   - avoid nephrotoxins. will continue to monitor    Arm Edema, so started gentle Lasix.   - mild Cr elevation on Low dose Lasix 20 mg qdaily (though arm edema better). will hold  - he received 2 doses.   - TTE without significant LV dysfunction

## 2021-12-28 NOTE — PROGRESS NOTE ADULT - SUBJECTIVE AND OBJECTIVE BOX
[facility], Division of Infectious Diseases  YOCASTA Pedraza Y. Patel, S. Shah, G. Casimir  [phone]  ([phone] - weekdays after 5pm and weekends)    Name: BRIA COREY  Age/Gender: 89y Male  MRN: [id number]    Interval History:  Patient seen this morning.   Notes reviewed.   Afebrile.     Allergies: Cipro (Hives)  fish (Unknown)      Objective:  Vitals:   T(F): 97.5 (12-28-21 @ 11:07), Max: 97.6 (12-27-21 @ 23:55)  HR: 69 (12-28-21 @ 11:07) (60 - 74)  BP: 122/64 (12-28-21 @ 11:07) (120/75 - 147/75)  RR: 20 (12-28-21 @ 12:14) (18 - 20)  SpO2: 93% (12-28-21 @ 12:14) (88% - 94%)  Physical Examination:  General: ill appearing  HEENT: anicteric  Cardio: S1, S2 present, normal rate  Resp: on HFNC, rhonchi  Abd: soft, ND  Ext: no LE edema, + b/l upper ext edema  Skin: warm, dry, no visible rash   Lines:    Laboratory Studies:  CBC:                       12.9   13.75 )-----------( 257      ( 28 Dec 2021 06:14 )             36.9     WBC Trend:  13.75 12-28-21 @ 06:14  15.35 12-27-21 @ 21:44  17.33 12-27-21 @ 14:40  13.61 12-26-21 @ 06:04  10.56 12-25-21 @ 06:34  7.20 12-24-21 @ 05:57  11.97 12-23-21 @ 16:22    CMP: 12-28    141  |  109<H>  |  69<H>  ----------------------------<  203<H>  4.1   |  18<L>  |  2.05<H>    Ca    8.6      28 Dec 2021 06:12    TPro  6.1  /  Alb  2.0<L>  /  TBili  0.6  /  DBili  x   /  AST  75<H>  /  ALT  77<H>  /  AlkPhos  125<H>  12-28      LIVER FUNCTIONS - ( 28 Dec 2021 06:12 )  Alb: 2.0 g/dL / Pro: 6.1 g/dL / ALK PHOS: 125 U/L / ALT: 77 U/L / AST: 75 U/L / GGT: x               Microbiology: reviewed     Culture - Urine (collected 12-26-21 @ 09:30)  Source: Clean Catch Clean Catch (Midstream)  Final Report (12-27-21 @ 07:40):    <10,000 CFU/mL Normal Urogenital Amber    Culture - Blood (collected 12-23-21 @ 18:53)  Source: .Blood Blood-Peripheral  Preliminary Report (12-24-21 @ 19:02):    No growth to date.    Culture - Blood (collected 12-23-21 @ 18:53)  Source: .Blood Blood-Peripheral  Preliminary Report (12-24-21 @ 19:02):    No growth to date.      Radiology: reviewed     Medications:  acetaminophen     Tablet .. 650 milliGRAM(s) Oral every 4 hours PRN  ALBUTerol    90 MICROgram(s) HFA Inhaler 2 Puff(s) Inhalation every 6 hours PRN  aMIOdarone    Tablet 200 milliGRAM(s) Oral daily  apixaban 2.5 milliGRAM(s) Oral every 12 hours  ascorbic acid 500 milliGRAM(s) Oral daily  aspirin enteric coated 81 milliGRAM(s) Oral daily  cholecalciferol 2000 Unit(s) Oral daily  dexAMETHasone  Injectable 6 milliGRAM(s) IV Push daily  dextrose 40% Gel 15 Gram(s) Oral once  dextrose 5%. 1000 milliLiter(s) IV Continuous <Continuous>  dextrose 5%. 1000 milliLiter(s) IV Continuous <Continuous>  dextrose 50% Injectable 25 Gram(s) IV Push once  dextrose 50% Injectable 12.5 Gram(s) IV Push once  escitalopram 15 milliGRAM(s) Oral daily  finasteride 5 milliGRAM(s) Oral daily  furosemide    Tablet 20 milliGRAM(s) Oral daily  glucagon  Injectable 1 milliGRAM(s) IntraMuscular once  guaifenesin/dextromethorphan Oral Liquid 10 milliLiter(s) Oral every 4 hours PRN  insulin lispro (ADMELOG) corrective regimen sliding scale   SubCutaneous three times a day before meals  insulin lispro (ADMELOG) corrective regimen sliding scale   SubCutaneous at bedtime  latanoprost 0.005% Ophthalmic Solution 1 Drop(s) Both EYES at bedtime  multivitamin 1 Tablet(s) Oral daily  piperacillin/tazobactam IVPB.. 3.375 Gram(s) IV Intermittent every 12 hours  remdesivir  IVPB   IV Intermittent   remdesivir  IVPB 100 milliGRAM(s) IV Intermittent every 24 hours  simvastatin 20 milliGRAM(s) Oral at bedtime  tamsulosin 0.4 milliGRAM(s) Oral at bedtime  thiamine 100 milliGRAM(s) Oral daily    Antimicrobials:  piperacillin/tazobactam IVPB.. 3.375 Gram(s) IV Intermittent every 12 hours  remdesivir  IVPB   IV Intermittent   remdesivir  IVPB 100 milliGRAM(s) IV Intermittent every 24 hours

## 2021-12-28 NOTE — CONSULT NOTE ADULT - SUBJECTIVE AND OBJECTIVE BOX
[facility] [place] [facility]   MD PABLO SEGOVIA MD RUORU WONG, PA    TEL:  OFFICE: [phone]  DR PEARCE CELL: [phone]  DANELLE ARROYO CELL: [phone]  DR. 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   Per daughter ,  patients home attendant noted that patient was breathing heavy on day of admission.  Patient has been gurgling during this time. Per family , patient has been having chest congestion for the past month , intermittent coughing with white sputum, occasional dysphagia with aspiration. There were no reported fever or chills. Per family , patient is bedbound , and has been sleeping most of the day for the past few months, with minimal interaction, although at times does perk up and may communicate.        PAST HISTORY  --------------------------------------------------------------------------------  PAST MEDICAL & SURGICAL HISTORY:  Type 2 diabetes mellitus with other specified complication, unspecified whether long term insulin use    Cerebrovascular accident (CVA), unspecified mechanism    Ventricular arrhythmia  On Amiodarone    Urinary tract infection associated with indwelling urethral catheter, initial encounter    History of appendectomy    Perforated bowel  [facility]    History of cataract extraction, unspecified laterality    Ganglion of left wrist      FAMILY HISTORY:  FH: myocardial infarction  parents      PAST SOCIAL HISTORY:    ALLERGIES & MEDICATIONS  --------------------------------------------------------------------------------  Allergies    Cipro (Hives)  fish (Unknown)    Intolerances    Pollen, hayfever (Unknown)    Standing Inpatient Medications  aMIOdarone    Tablet 200 milliGRAM(s) Oral daily  apixaban 2.5 milliGRAM(s) Oral every 12 hours  ascorbic acid 500 milliGRAM(s) Oral daily  aspirin enteric coated 81 milliGRAM(s) Oral daily  cholecalciferol 2000 Unit(s) Oral daily  dexAMETHasone  Injectable 6 milliGRAM(s) IV Push daily  dextrose 40% Gel 15 Gram(s) Oral once  dextrose 5%. 1000 milliLiter(s) IV Continuous <Continuous>  dextrose 5%. 1000 milliLiter(s) IV Continuous <Continuous>  dextrose 50% Injectable 25 Gram(s) IV Push once  dextrose 50% Injectable 12.5 Gram(s) IV Push once  escitalopram 15 milliGRAM(s) Oral daily  finasteride 5 milliGRAM(s) Oral daily  furosemide    Tablet 20 milliGRAM(s) Oral daily  glucagon  Injectable 1 milliGRAM(s) IntraMuscular once  insulin lispro (ADMELOG) corrective regimen sliding scale   SubCutaneous three times a day before meals  insulin lispro (ADMELOG) corrective regimen sliding scale   SubCutaneous at bedtime  latanoprost 0.005% Ophthalmic Solution 1 Drop(s) Both EYES at bedtime  multivitamin 1 Tablet(s) Oral daily  piperacillin/tazobactam IVPB.. 3.375 Gram(s) IV Intermittent every 12 hours  remdesivir  IVPB   IV Intermittent   remdesivir  IVPB 100 milliGRAM(s) IV Intermittent every 24 hours  simvastatin 20 milliGRAM(s) Oral at bedtime  tamsulosin 0.4 milliGRAM(s) Oral at bedtime  thiamine 100 milliGRAM(s) Oral daily    PRN Inpatient Medications  acetaminophen     Tablet .. 650 milliGRAM(s) Oral every 4 hours PRN  ALBUTerol    90 MICROgram(s) HFA Inhaler 2 Puff(s) Inhalation every 6 hours PRN  guaifenesin/dextromethorphan Oral Liquid 10 milliLiter(s) Oral every 4 hours PRN      REVIEW OF SYSTEMS  --------------------------------------------------------------------------------  unable to obtain sec to mental status    VITALS/PHYSICAL EXAM  --------------------------------------------------------------------------------  T(C): 36.4 (12-28-21 @ 05:05), Max: 36.4 (12-27-21 @ 23:55)  HR: 74 (12-28-21 @ 05:05) (60 - 78)  BP: 147/75 (12-28-21 @ 05:05) (110/62 - 147/75)  RR: 20 (12-28-21 @ 05:05) (18 - 20)  SpO2: 91% (12-28-21 @ 05:05) (88% - 94%)  Wt(kg): --        12-27-21 @ 07:01  -  12-28-21 @ 07:00  --------------------------------------------------------  IN: 0 mL / OUT: 200 mL / NET: -200 mL      Physical Exam   GENERAL: on high flow  HEAD:  Atraumatic, Normocephalic  EYES: EOMI,   NECK: Supple,   CHEST/LUNG: mild decrease breath sounds bilaterally  HEART: Regular rate and rhythm;   ABDOMEN: Soft,   : Landeros in place,   Neuro: AAOx0, eyes open  EXTREMITIES:  trace LE edema  LABS/STUDIES  --------------------------------------------------------------------------------              12.9   13.75 >-----------<  257      [12-28-21 @ 06:14]              36.9     141  |  109  |  69  ----------------------------<  203      [12-28-21 @ 06:12]  4.1   |  18  |  2.05        Ca     8.6     [12-28-21 @ 06:12]    TPro  6.1  /  Alb  2.0  /  TBili  0.6  /  DBili  x   /  AST  75  /  ALT  77  /  AlkPhos  125  [12-28-21 @ 06:12]          Creatinine Trend:  SCr 2.05 [12-28 @ 06:12]  SCr 1.95 [12-27 @ 21:44]  SCr 1.85 [12-27 @ 14:40]  SCr 1.35 [12-26 @ 06:04]  SCr 1.44 [12-25 @ 06:34]    Urinalysis - [12-26-21 @ 06:04]      Color Yellow / Appearance Slightly Turbid / SG 1.025 / pH 6.0      Gluc Negative / Ketone Negative  / Bili Negative / Urobili Negative       Blood Moderate / Protein 30 mg/dL / Leuk Est Negative / Nitrite Negative      RBC 13 / WBC 12 / Hyaline 4 / Gran  / Sq Epi  / Non Sq Epi 8 / Bacteria Few      Ferritin 990      [12-28-21 @ 09:02]    HBsAg Nonreact      [12-25-21 @ 09:17]  HCV 0.86, Nonreact      [12-25-21 @ 09:17]

## 2021-12-29 NOTE — PROGRESS NOTE ADULT - ASSESSMENT
Acute hypoxemic respiratory failue due to multi-lobar COVID 19 viral pneumonia with probable superimposed aspiration, MRSA positive  CKD  Occiptial CVA - chronically bedbound    REC    Continue Vancomycin and Cefepime  Titrate HiFlo FIO2 to target sat 88-90%  Continue Decadron IV  Repeat CXR ordered  Aspiration precuations  Consider increase Apaxiban to 5 mg bid  Trend D-dimer  LE dopplers when stable

## 2021-12-29 NOTE — PROGRESS NOTE ADULT - SUBJECTIVE AND OBJECTIVE BOX
[facility]   MD PABLO SEGOVIA MD RUORU WONG, PA    TEL:  OFFICE: [phone]  DR PEARCE CELL: [phone]  GAETANO RIBEIRO CELL: [phone]  DR. HOPPER CELL: [phone]      FROM 5 PM - 7 AM PLEASE CALL ANSWERING SERVICE: [phone]    RENAL FOLLOW UP NOTE--Date of Service 12-29-21 @ 09:01  --------------------------------------------------------------------------------  HPI:  Pt seen and examined at bedside.       PAST HISTORY  --------------------------------------------------------------------------------  No significant changes to PMH, PSH, FHx, SHx, unless otherwise noted    ALLERGIES & MEDICATIONS  --------------------------------------------------------------------------------  Allergies    Cipro (Hives)  fish (Unknown)    Intolerances    Pollen, hayfever (Unknown)    Standing Inpatient Medications  aMIOdarone    Tablet 200 milliGRAM(s) Oral daily  apixaban 2.5 milliGRAM(s) Oral every 12 hours  ascorbic acid 500 milliGRAM(s) Oral daily  aspirin enteric coated 81 milliGRAM(s) Oral daily  cholecalciferol 2000 Unit(s) Oral daily  dexAMETHasone  Injectable 6 milliGRAM(s) IV Push daily  dextrose 40% Gel 15 Gram(s) Oral once  dextrose 5%. 1000 milliLiter(s) IV Continuous <Continuous>  dextrose 5%. 1000 milliLiter(s) IV Continuous <Continuous>  dextrose 50% Injectable 25 Gram(s) IV Push once  dextrose 50% Injectable 12.5 Gram(s) IV Push once  escitalopram 15 milliGRAM(s) Oral daily  finasteride 5 milliGRAM(s) Oral daily  glucagon  Injectable 1 milliGRAM(s) IntraMuscular once  insulin lispro (ADMELOG) corrective regimen sliding scale   SubCutaneous three times a day before meals  insulin lispro (ADMELOG) corrective regimen sliding scale   SubCutaneous at bedtime  latanoprost 0.005% Ophthalmic Solution 1 Drop(s) Both EYES at bedtime  multivitamin 1 Tablet(s) Oral daily  piperacillin/tazobactam IVPB.. 3.375 Gram(s) IV Intermittent every 12 hours  remdesivir  IVPB   IV Intermittent   remdesivir  IVPB 100 milliGRAM(s) IV Intermittent every 24 hours  simvastatin 20 milliGRAM(s) Oral at bedtime  tamsulosin 0.4 milliGRAM(s) Oral at bedtime  thiamine 100 milliGRAM(s) Oral daily    PRN Inpatient Medications  acetaminophen     Tablet .. 650 milliGRAM(s) Oral every 4 hours PRN  ALBUTerol    90 MICROgram(s) HFA Inhaler 2 Puff(s) Inhalation every 6 hours PRN  guaifenesin/dextromethorphan Oral Liquid 10 milliLiter(s) Oral every 4 hours PRN      REVIEW OF SYSTEMS  --------------------------------------------------------------------------------  General: no fever  MSK: no edema     VITALS/PHYSICAL EXAM  --------------------------------------------------------------------------------  T(C): 36.9 (12-29-21 @ 06:01), Max: 36.9 (12-29-21 @ 06:01)  HR: 86 (12-29-21 @ 06:01) (50 - 86)  BP: 98/63 (12-29-21 @ 06:01) (98/63 - 154/77)  RR: 20 (12-29-21 @ 06:01) (18 - 22)  SpO2: 92% (12-29-21 @ 06:01) (82% - 95%)  Wt(kg): --        12-28-21 @ 07:01  -  12-29-21 @ 07:00  --------------------------------------------------------  IN: 0 mL / OUT: 350 mL / NET: -350 mL      Physical Exam per medicine  LABS/STUDIES  --------------------------------------------------------------------------------              12.9   13.75 >-----------<  257      [12-28-21 @ 06:14]              36.9     141  |  109  |  69  ----------------------------<  203      [12-28-21 @ 06:12]  4.1   |  18  |  2.05        Ca     8.6     [12-28-21 @ 06:12]    TPro  6.1  /  Alb  2.0  /  TBili  0.6  /  DBili  x   /  AST  75  /  ALT  77  /  AlkPhos  125  [12-28-21 @ 06:12]          Creatinine Trend:  SCr 2.05 [12-28 @ 06:12]  SCr 1.95 [12-27 @ 21:44]  SCr 1.85 [12-27 @ 14:40]  SCr 1.35 [12-26 @ 06:04]  SCr 1.44 [12-25 @ 06:34]    Urinalysis - [12-26-21 @ 06:04]      Color Yellow / Appearance Slightly Turbid / SG 1.025 / pH 6.0      Gluc Negative / Ketone Negative  / Bili Negative / Urobili Negative       Blood Moderate / Protein 30 mg/dL / Leuk Est Negative / Nitrite Negative      RBC 13 / WBC 12 / Hyaline 4 / Gran  / Sq Epi  / Non Sq Epi 8 / Bacteria Few      Ferritin 990      [12-28-21 @ 09:02]    HBsAg Nonreact      [12-25-21 @ 09:17]  HCV 0.86, Nonreact      [12-25-21 @ 09:17]

## 2021-12-29 NOTE — PROGRESS NOTE ADULT - SUBJECTIVE AND OBJECTIVE BOX
Follow-up Pulm Progress Note  Connor Joe MD  [phone]    Afebrile and hemodynamically stable: FIO2 on HiFlo nasal cannula increased to 100%  Remains obtunded  Creatinine increased to 2.39  D-dimer uptrendin  MRSA positive PCR: Vanco added to cefepime      Medications:  Vital Signs Last 24 Hrs  T(C): 37.1 (29 Dec 2021 10:58), Max: 37.1 (29 Dec 2021 10:58)  T(F): 98.8 (29 Dec 2021 10:58), Max: 98.8 (29 Dec 2021 10:58)  HR: 85 (29 Dec 2021 10:58) (50 - 86)  BP: 98/60 (29 Dec 2021 10:58) (98/60 - 154/77)  BP(mean): --  RR: 20 (29 Dec 2021 10:58) (20 - 22)  SpO2: 92% (29 Dec 2021 10:58) (82% - 95%)  ABG - ( 27 Dec 2021 17:37 )  pH, Arterial: 7.43  pH, Blood: x     /  pCO2: 30    /  pO2: 64    / HCO3: 20    / Base Excess: -3.3  /  SaO2: 93.4         @ 07:01  -   @ 07:00  --------------------------------------------------------  IN: 0 mL / OUT: 350 mL / NET: -350 mL    LABS:                        11.9   19.14 )-----------( 264      ( 29 Dec 2021 09:50 )             34.5         142  |  110<H>  |  78<H>  ----------------------------<  236<H>  4.4   |  16<L>  |  2.39<H>    Ca    7.9<L>      29 Dec 2021 12:23    TPro  6.1  /  Alb  2.0<L>  /  TBili  0.6  /  DBili  x   /  AST  75<H>  /  ALT  77<H>  /  AlkPhos  125<H>      Procalcitonin, Serum: 0.66 ng/mL (21 @ 06:12)  Procalcitonin, Serum: 0.65 ng/mL (21 @ 06:12)        CULTURES:  Culture Results:   Growth in aerobic bottle: Gram Positive Cocci in Clusters  Growth in anaerobic bottle: Gram Positive Cocci in Clusters ( @ 21:59)  Culture Results:   Growth in anaerobic bottle: Staphylococcus aureus  Growth in aerobic bottle: Gram Positive Cocci in Clusters  ***Blood Panel PCR results on this specimen are available  approximately 3 hours after the Gram stain result.***  Gram stain, PCR, and/or culture results may not always  correspond due to difference in methodologies.  ************************************************************  This PCR assay was performed by multiplex PCR. This  Assay tests for 66 bacterial and resistance gene targets.  Please refer to the [facility] test directory  at [email or web address] for details. ( @ :59)  Culture Results:   <10,000 CFU/mL Normal Urogenital Amber ( @ 09:30)  Culture Results:   No Growth Final ( @ 18:53)  Culture Results:   No Growth Final ( @ 18:53)    Most recent blood culture -- :59   Blood Culture PCR Blood Culture PCR .Blood Blood-Peripheral  @ 21:59  Most recent blood culture --  @ 09:30   -- -- Clean Catch Clean Catch (Midstream)  @ 09:30      Physical Examination:  PULM: No wheeze or rhonchi  CVS: Regular rate and rhythm, no murmurs, rubs, or gallops  ABD: Soft, non-tender  EXT:  No clubbing, cyanosis, or edema    RADIOLOGY REVIEWED  CXR:    CT chest:    PROCEDURE DATE:  2021      INTERPRETATION:  Reason for Exam: Shortness of breath. chest pain.    CTA of the chest was performed from the thoracic inlet to the level of   the adrenal glands following IV contrast injection of  80 cc of Omnipaque   350. No immediate complications were reported.  MIP images were also   created and reviewed.    Comparison: Chest xray of same date.    Tubes/Lines: None    Mediastinum and Heart: Aorta and pulmonary arteries are normal in size. A   left lower pole thyroid nodule is seen measures 6 mm. No lymphadenopathy.   No pericardial effusion.    Lungs, Pleura, and Airways: There is no pulmonary embolus. Peripheral   ground glass opacities. Bilateral endobronchial secretions noted.    Visualized Abdomen: Unremarkable.    Bones and soft tissues: Unremarkable.    IMPRESSION:    No pulmonary embolus.    Peripheral ground glass opacities noted bilaterally, which may represent   COVID in the right clinical setting.    Bilateral endobronchial secretions noted.    TTE:

## 2021-12-29 NOTE — PROGRESS NOTE ADULT - SUBJECTIVE AND OBJECTIVE BOX
SUBJECTIVE / OVERNIGHT EVENTS:  mental status more awake  comfortable on hi-flow 100% FiO2, saturating low 90s      Vital Signs Last 24 Hrs  T(C): 36.9 (29 Dec 2021 06:01), Max: 36.9 (29 Dec 2021 06:01)  T(F): 98.5 (29 Dec 2021 06:01), Max: 98.5 (29 Dec 2021 06:01)  HR: 81 (29 Dec 2021 09:39) (50 - 86)  BP: 98/63 (29 Dec 2021 06:01) (98/63 - 154/77)  BP(mean): --  RR: 20 (29 Dec 2021 09:39) (18 - 22)  SpO2: 91% (29 Dec 2021 09:39) (82% - 95%)    I&O's Summary    12-28-21 @ 07:01  -  12-29-21 @ 07:00  --------------------------------------------------------  IN: 0 mL / OUT: 350 mL / NET: -350 mL        PHYSICAL EXAM:  GENERAL: NAD, well-developed, on hi-flow  HEAD:  Atraumatic, Normocephalic  EYES: EOMI, PERRLA, conjunctiva and sclera clear  NECK: Supple, No JVD  CHEST/LUNG: mild decrease breath sounds bilaterally; No wheeze   HEART: Regular rate and rhythm; No murmurs, rubs, or gallops  ABDOMEN: Soft, Nontender, Nondistended; Bowel sounds present  : Cortes in place, danielle color urine, neg CVAT   Neuro: AAOx0, eyes open, awake but no meaningful interaction,   EXTREMITIES:  2+ Peripheral Pulses, No clubbing, cyanosis, +B/L UE edema.  LE b/l less edematous   SKIN: No rashes or lesions     LABS:                        12.9   13.75 )-----------( 257      ( 28 Dec 2021 06:14 )             36.9     12-28    141  |  109<H>  |  69<H>  ----------------------------<  203<H>  4.1   |  18<L>  |  2.05<H>    Ca    8.6      28 Dec 2021 06:12    TPro  6.1  /  Alb  2.0<L>  /  TBili  0.6  /  DBili  x   /  AST  75<H>  /  ALT  77<H>  /  AlkPhos  125<H>  12-28      CAPILLARY BLOOD GLUCOSE      POCT Blood Glucose.: 234 mg/dL (29 Dec 2021 08:04)  POCT Blood Glucose.: 204 mg/dL (28 Dec 2021 21:47)  POCT Blood Glucose.: 216 mg/dL (28 Dec 2021 16:37)  POCT Blood Glucose.: 242 mg/dL (28 Dec 2021 12:23)            RADIOLOGY & ADDITIONAL TESTS:    Imaging Personally Reviewed:  [x] YES  [ ] NO    Will obtain old records:  [ ] YES  [x] NO

## 2021-12-29 NOTE — PROGRESS NOTE ADULT - ASSESSMENT
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   Per daughter ,patients home attendant noted that patient was breathing heavy on day of admission. Per family , patient has been having chest congestion for the past month , intermittent coughing with white sputum, occasional dysphagia  patient is bedbound , and has been sleeping most of the day for the past few months, with minimal interaction, although at times does perk up and may communicate    MRSA bacteremia  blood cx 12/27 positive for MRSA in 4/4 bottles  unclear source at this time, possibly skin wound vs PNA  c/w vancomycin 1g every 24 hours, goal trough 15-20  obtain trough prior to dose on 12/30 (will be in the evening)  repeat blood cultures ordered for tomorrow  obtain TTE    Covid PNA, acute hypoxic resp failure  - Pt with low grade temp on admission 100F, WBC 11.  Initially requiring 4L NC.  CTA chest no PE, (+) peripheral GGOs and b/l endobronchial secretions.  s/p landeros placement for urinary retention  - Pt on home AC, apixiban.  No PE on CTA chest.  c/w remdesivir x 5 day course  c/w dexamethasone 6mg IV qdaily x 10 days  pt w/ s/p tocilizumab 12/27  CXR w/ possible LLL infiltrate  c/w empiric zosyn x 5 day course for possible superimposed aspiration PNA  Wean O2 as tolerated  monitor inflammatory markers    Henrry Mejia M.D.  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   Per daughter ,patients home attendant noted that patient was breathing heavy on day of admission. Per family , patient has been having chest congestion for the past month , intermittent coughing with white sputum, occasional dysphagia  patient is bedbound , and has been sleeping most of the day for the past few months, with minimal interaction, although at times does perk up and may communicate    MRSA bacteremia  blood cx 12/27 positive for MRSA in 4/4 bottles  unclear source at this time, possibly skin wound vs PNA  c/w vancomycin 1g every 24 hours, goal trough 15-20  obtain trough prior to dose on 12/30 (will be in the evening)  repeat blood cultures ordered for tomorrow  obtain TTE    Covid PNA, acute hypoxic resp failure  - Pt with low grade temp on admission 100F, WBC 11.  Initially requiring 4L NC.  CTA chest no PE, (+) peripheral GGOs and b/l endobronchial secretions.  s/p alnderos placement for urinary retention  - Pt on home AC, apixiban.  No PE on CTA chest.  c/w remdesivir x 5 day course  c/w dexamethasone 6mg IV qdaily x 10 days  pt w/ s/p tocilizumab 12/27  CXR w/ possible LLL infiltrate  will switch zosyn to cefepime due to increased risk of nephrotoxicity from vanc and zosyn combination; plan for 5 day course for possible superimposed aspiration PNA  Wean O2 as tolerated  monitor inflammatory markers    Henrry Mejia M.D.  [facility], Division of Infectious Diseases  [phone]  After 5pm on weekdays and all day on weekends - please call [phone]

## 2021-12-29 NOTE — PROGRESS NOTE ADULT - ASSESSMENT
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

## 2021-12-29 NOTE — PROGRESS NOTE ADULT - SUBJECTIVE AND OBJECTIVE BOX
[facility], Division of Infectious Diseases  YOCASTA Pedraza Y. Patel, S. Shah, G. Casimir  [phone]  ([phone] - weekdays after 5pm and weekends)    Name: BRIA COERY  Age/Gender: 89y Male  MRN: [id number]    Interval History:  Patient seen this morning.   Notes reviewed.   Afebrile.   blood cx positive for MRSA  worsening leukocytosis   s/p vanc    Allergies: Cipro (Hives)  fish (Unknown)      Objective:  Vitals:   T(F): 98.8 (12-29-21 @ 10:58), Max: 98.8 (12-29-21 @ 10:58)  HR: 85 (12-29-21 @ 10:58) (50 - 86)  BP: 98/60 (12-29-21 @ 10:58) (98/60 - 154/77)  RR: 20 (12-29-21 @ 10:58) (20 - 22)  SpO2: 92% (12-29-21 @ 10:58) (82% - 95%)  Physical Examination:  General: ill appearing  HEENT: anicteric  Cardio: S1, S2 present, normal rate  Resp: on HFNC  Abd: soft, ND  Ext: no LE edema, + b/l upper ext edema  Skin: warm, dry, no visible rash   Lines:    Laboratory Studies:  CBC:                       11.9   19.14 )-----------( 264      ( 29 Dec 2021 09:50 )             34.5     WBC Trend:  19.14 12-29-21 @ 09:50  13.75 12-28-21 @ 06:14  15.35 12-27-21 @ 21:44  17.33 12-27-21 @ 14:40  13.61 12-26-21 @ 06:04  10.56 12-25-21 @ 06:34  7.20 12-24-21 @ 05:57  11.97 12-23-21 @ 16:22    CMP: 12-29    136  |  97  |  72<H>  ----------------------------<  613<HH>  3.6   |  15<L>  |  2.13<H>    Ca    7.1<L>      29 Dec 2021 09:50    TPro  6.1  /  Alb  2.0<L>  /  TBili  0.6  /  DBili  x   /  AST  75<H>  /  ALT  77<H>  /  AlkPhos  125<H>  12-28      LIVER FUNCTIONS - ( 28 Dec 2021 06:12 )  Alb: 2.0 g/dL / Pro: 6.1 g/dL / ALK PHOS: 125 U/L / ALT: 77 U/L / AST: 75 U/L / GGT: x           Vancomycin Level, Trough: 10.5 ug/mL (12-29-21 @ 09:50)      Microbiology: reviewed     Culture - Blood (collected 12-27-21 @ 21:59)  Source: .Blood Blood-Peripheral  Gram Stain (12-29-21 @ 03:57):    Growth in anaerobic bottle: Gram Positive Cocci in Clusters    Growth in aerobic bottle: Gram Positive Cocci in Clusters  Preliminary Report (12-29-21 @ 03:57):    Growth in anaerobic bottle: Gram Positive Cocci in Clusters    Growth in aerobic bottle: Gram Positive Cocci in Clusters    ***Blood Panel PCR results on this specimen are available    approximately 3 hours after the Gram stain result.***    Gram stain, PCR, and/or culture results may not always    correspond due to difference in methodologies.    ************************************************************    This PCR assay was performed by multiplex PCR. This    Assay tests for 66 bacterial and resistance gene targets.    Please refer to the [facility] test directory    at [email or web address] for details.  Organism: Blood Culture PCR (12-28-21 @ 18:09)  Organism: Blood Culture PCR (12-28-21 @ 18:09)      -  Methicillin resistant Staphylococcus aureus (MRSA): Detec      Method Type: PCR    Culture - Blood (collected 12-27-21 @ 21:59)  Source: .Blood Blood-Peripheral  Gram Stain (12-28-21 @ 22:20):    Growth in aerobic bottle: Gram Positive Cocci in Clusters    Growth in anaerobic bottle: Gram Positive Cocci in Clusters  Preliminary Report (12-28-21 @ 22:20):    Growth in aerobic bottle: Gram Positive Cocci in Clusters    Growth in anaerobic bottle: Gram Positive Cocci in Clusters    Culture - Urine (collected 12-26-21 @ 09:30)  Source: Clean Catch Clean Catch (Midstream)  Final Report (12-27-21 @ 07:40):    <10,000 CFU/mL Normal Urogenital Amber    Culture - Blood (collected 12-23-21 @ 18:53)  Source: .Blood Blood-Peripheral  Final Report (12-28-21 @ 19:01):    No Growth Final    Culture - Blood (collected 12-23-21 @ 18:53)  Source: .Blood Blood-Peripheral  Final Report (12-28-21 @ 19:00):    No Growth Final      Radiology: reviewed     Medications:  acetaminophen     Tablet .. 650 milliGRAM(s) Oral every 4 hours PRN  ALBUTerol    90 MICROgram(s) HFA Inhaler 2 Puff(s) Inhalation every 6 hours PRN  aMIOdarone    Tablet 200 milliGRAM(s) Oral daily  apixaban 2.5 milliGRAM(s) Oral every 12 hours  ascorbic acid 500 milliGRAM(s) Oral daily  aspirin enteric coated 81 milliGRAM(s) Oral daily  cholecalciferol 2000 Unit(s) Oral daily  dexAMETHasone  Injectable 6 milliGRAM(s) IV Push daily  dextrose 40% Gel 15 Gram(s) Oral once  dextrose 5%. 1000 milliLiter(s) IV Continuous <Continuous>  dextrose 5%. 1000 milliLiter(s) IV Continuous <Continuous>  dextrose 50% Injectable 25 Gram(s) IV Push once  dextrose 50% Injectable 12.5 Gram(s) IV Push once  escitalopram 15 milliGRAM(s) Oral daily  finasteride 5 milliGRAM(s) Oral daily  glucagon  Injectable 1 milliGRAM(s) IntraMuscular once  guaifenesin/dextromethorphan Oral Liquid 10 milliLiter(s) Oral every 4 hours PRN  insulin lispro (ADMELOG) corrective regimen sliding scale   SubCutaneous three times a day before meals  insulin lispro (ADMELOG) corrective regimen sliding scale   SubCutaneous at bedtime  latanoprost 0.005% Ophthalmic Solution 1 Drop(s) Both EYES at bedtime  multivitamin 1 Tablet(s) Oral daily  piperacillin/tazobactam IVPB.. 3.375 Gram(s) IV Intermittent every 12 hours  remdesivir  IVPB   IV Intermittent   remdesivir  IVPB 100 milliGRAM(s) IV Intermittent every 24 hours  simvastatin 20 milliGRAM(s) Oral at bedtime  tamsulosin 0.4 milliGRAM(s) Oral at bedtime  thiamine 100 milliGRAM(s) Oral daily  vancomycin  IVPB 1000 milliGRAM(s) IV Intermittent every 24 hours    Antimicrobials:  piperacillin/tazobactam IVPB.. 3.375 Gram(s) IV Intermittent every 12 hours  remdesivir  IVPB   IV Intermittent   remdesivir  IVPB 100 milliGRAM(s) IV Intermittent every 24 hours  vancomycin  IVPB 1000 milliGRAM(s) IV Intermittent every 24 hours

## 2021-12-30 NOTE — PROGRESS NOTE ADULT - SUBJECTIVE AND OBJECTIVE BOX
Follow-up Pulm Progress Note  Connor Joe MD  [phone]    Afebrile and hemodynamically stable: FIO2 on HiFlo remains  100%  Remains obtunded  Creatinine increased to 3.0  D-dimer uptrendin on   MRSA positive PCR: Vanco added to cefepime  F/U CXR : increasing opacities bilateral    Medications:  Vital Signs Last 24 Hrs  T(C): 37.1 (29 Dec 2021 10:58), Max: 37.1 (29 Dec 2021 10:58)  T(F): 98.8 (29 Dec 2021 10:58), Max: 98.8 (29 Dec 2021 10:58)  HR: 85 (29 Dec 2021 10:58) (50 - 86)  BP: 98/60 (29 Dec 2021 10:58) (98/60 - 154/77)  BP(mean): --  RR: 20 (29 Dec 2021 10:58) (20 - 22)  SpO2: 92% (29 Dec 2021 10:58) (82% - 95%)  ABG - ( 27 Dec 2021 17:37 )  pH, Arterial: 7.43  pH, Blood: x     /  pCO2: 30    /  pO2: 64    / HCO3: 20    / Base Excess: -3.3  /  SaO2: 93.4         @ 07:01  -   @ 07:00  --------------------------------------------------------  IN: 0 mL / OUT: 350 mL / NET: -350 mL    LABS:                        11.9   19.14 )-----------( 264      ( 29 Dec 2021 09:50 )             34.5         142  |  110<H>  |  78<H>  ----------------------------<  236<H>  4.4   |  16<L>  |  2.39<H>    Ca    7.9<L>      29 Dec 2021 12:23    TPro  6.1  /  Alb  2.0<L>  /  TBili  0.6  /  DBili  x   /  AST  75<H>  /  ALT  77<H>  /  AlkPhos  125<H>      Procalcitonin, Serum: 0.66 ng/mL (21 @ 06:12)  Procalcitonin, Serum: 0.65 ng/mL (21 @ 06:12)        CULTURES:  Culture Results:   Growth in aerobic bottle: Gram Positive Cocci in Clusters  Growth in anaerobic bottle: Gram Positive Cocci in Clusters ( @ 21:59)  Culture Results:   Growth in anaerobic bottle: Staphylococcus aureus  Growth in aerobic bottle: Gram Positive Cocci in Clusters  ***Blood Panel PCR results on this specimen are available  approximately 3 hours after the Gram stain result.***  Gram stain, PCR, and/or culture results may not always  correspond due to difference in methodologies.  ************************************************************  This PCR assay was performed by multiplex PCR. This  Assay tests for 66 bacterial and resistance gene targets.  Please refer to the [facility] test directory  at [email or web address] for details. ( @ 21:59)  Culture Results:   <10,000 CFU/mL Normal Urogenital Amber ( @ 09:30)  Culture Results:   No Growth Final ( @ 18:53)  Culture Results:   No Growth Final ( @ 18:53)    Most recent blood culture --  @ 21:59   Blood Culture PCR Blood Culture PCR .Blood Blood-Peripheral  @ 21:59  Most recent blood culture --  @ 09:30   -- -- Clean Catch Clean Catch (Midstream)  @ 09:30      Physical Examination:  PULM: No wheeze or rhonchi  CVS: Regular rate and rhythm, no murmurs, rubs, or gallops  ABD: Soft, non-tender  EXT:  No clubbing, cyanosis, or edema    RADIOLOGY REVIEWED  CXR:    CT chest:    PROCEDURE DATE:  2021      INTERPRETATION:  Reason for Exam: Shortness of breath. chest pain.    CTA of the chest was performed from the thoracic inlet to the level of   the adrenal glands following IV contrast injection of  80 cc of Omnipaque   350. No immediate complications were reported.  MIP images were also   created and reviewed.    Comparison: Chest xray of same date.    Tubes/Lines: None    Mediastinum and Heart: Aorta and pulmonary arteries are normal in size. A   left lower pole thyroid nodule is seen measures 6 mm. No lymphadenopathy.   No pericardial effusion.    Lungs, Pleura, and Airways: There is no pulmonary embolus. Peripheral   ground glass opacities. Bilateral endobronchial secretions noted.    Visualized Abdomen: Unremarkable.    Bones and soft tissues: Unremarkable.    IMPRESSION:    No pulmonary embolus.    Peripheral ground glass opacities noted bilaterally, which may represent   COVID in the right clinical setting.    Bilateral endobronchial secretions noted.    TTE:

## 2021-12-30 NOTE — CONSULT NOTE ADULT - REASON FOR ADMISSION
respiratory distress x 1 day

## 2021-12-30 NOTE — CONSULT NOTE ADULT - ASSESSMENT
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   Per daughter ,patients home attendant noted that patient was breathing heavy on day of admission.  Patient has been gurgling during this time. Per family , patient has been having chest congestion for the past month , intermittent coughing with white sputum, occasional dysphagia with aspiration. There were no reported fever or chills. Per family , patient is bedbound , and has been sleeping most of the day for the past few months, with minimal interaction, although at times does perk up and may communicate.  Family was also concerned  about bleeding in the landeros cath , which has been occurring intermittently since it was exchanged on 12/15.  (23 Dec 2021 23:24)      Covid (+):    - Pt with low grade temp on admission 100F, WBC 11.  Initially requiring 4L NC.  CTA chest no PE, (+) peripheral GGOs and b/l endobronchial secretions.  Agree with dexamethasone 6mg IV qdaily x 10 days.  Remdesivir held for now due to elevated LFTs and renal function.  Cont to monitor, if continues to improve tomorrow, consider starting.  Pt s/p landeros placement for urinary retention.     - Maintain oxygenation >90%.  Wean O2 as tolerated, now on 2L    - Check inflammatory markers  CRP: 101  Procalcitonin: 0.55    - Pt on home AC, apixiban.  No PE on CTA chest.    - Do not suspect superimposed bacterial infection.  Observe off abx for now.      Will follow,    Samantha Vela  [phone]
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, T2DM  s/p indwelling landeros due to urinary retention  presents for respiratory distress with COVID 19+    Wound consulted assistance with management of evolving sacral deep tissue injury  incontinent of stool  incontinent dermatitis    Sacral buttocks- TRIAD paste BID and prn soiling      Continue w/ Pericare as per protocol  BLE elevation  Abx per Medicine/ ID  Moisturize intact skin w/ SWEEN cream BID  Nutrition Consult for optimization as tolerated in pt w/ mild Protein Calorie Malnutrition        consider MVI & Vit C to promote wound healing        encourage high quality protein, w/ supplements   Hyperglycemia improving w/ ADA diet and FS w/ ISS   Continue turning and positioning w/ offloading assistive devices as per protocol  Waffle Cushion to chair when oob to chair  Continue w/ low air loss bed surface   Care as per medicine, will follow w/ you  Upon discharge f/u as outpatient at [facility] [address] [place], [place] [phone]  Seen w/ attng and D/w team  Thank you for this consult  Stephany Block PA-C [id number]  I spent 50minutes face to face w/ this pt of which more than 50% of the time was spent counseling & coordinating care of this pt. 
89M admitted 12/23 COVID positive with multilobar consildation c/w lpneumonia, now with increasing FIO2 requirements over past 48 hours. Patient s/p Tociluzamib 12/27 and is currently on empiric Zosyn for possible aspiration, Decadron, and Remdesevir. D-dimer in 500 range stable. Patient on Apaxiban. Underlying CKD with baseline creatinine 1.8 - increased now slightly to 2 post diuresis. TTE limited, grossly normal LV/RV and diastolic function with indeterminate AS.     REC    Continue Zosyn for possible superimposed aspiration  Aspiration precuations: HOB elevated 30-45 degrees  Complete Remdesevir and Decadron  Taper FIO2 on HiFlo nasal cannula as tolerted: target saturation 90-92%  Trend inlfammatory markers and D-dimer: continue Apaxiban

## 2021-12-30 NOTE — PROGRESS NOTE ADULT - ASSESSMENT
Acute hypoxemic respiratory failue due to multi-lobar COVID 19 viral pneumonia with probable superimposed aspiration, MRSA positive  CKD  Occiptial CVA - chronically bedbound    REC    Continue Vancomycin and Cefepime  LE dopplers ordered  Continue Apixaban 2.5 bid - consider conversion to full dose Heparin , dopplers pending  Titrate HiFlo FIO2 to target sat 88-90%  Continue Decadron IV  Aspiration precuations  Repeat D-dimer 12/31

## 2021-12-30 NOTE — CONSULT NOTE ADULT - SUBJECTIVE AND OBJECTIVE BOX
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   Per daughter ,  patients home attendant noted that patient was breathing heavy on day of admission.  Patient has been gurgling during this time. Per family , patient has been having chest congestion for the past month , intermittent coughing with white sputum, occasional dysphagia with aspiration. There were no reported fever or chills. Per family , patient is bedbound , and has been sleeping most of the day for the past few months, with minimal interaction, although at times does perk up and may communicate.  Family was also concerned  about bleeding in the landeros cath , which has been occurring intermittently since it was exchanged on 12/15.  (23 Dec 2021 23:24)        N/V/D,  BM/ Flatus,   NGT,     palp/ sob/dyspnea/ cp,       F/C/S  Wound consult requested to assist w/ management of      wound/ pressure injury.    c/o pain, drainage, odor, color change,  worsening swelling . Increasingly sedentary.  Incontinent of urine & stool.  H/o falls, trauma. Aquacell/ Allevyn/ medihoney/ dakins/ Adaptic/ DSD used at home/ while awaiting consult.  Appetite good/ decreased.  weight loss.   All questions asked and answered to pt's and family's satisfaction.    Current Diet: Diet, Regular:   Pureed (PUREED)  Moderately Thick Liquids (MODTHICKLIQS)  Supplement Feeding Modality:  Oral  Glucerna Shake Cans or Servings Per Day:  2       Frequency:  Daily (12-25-21 @ 20:57)      PAST MEDICAL & SURGICAL HISTORY:  Type 2 diabetes mellitus with other specified complication, unspecified whether long term insulin use    Cerebrovascular accident (CVA), unspecified mechanism    Ventricular arrhythmia  On Amiodarone    Urinary tract infection associated with indwelling urethral catheter, initial encounter    History of appendectomy    Perforated bowel  2003/[facility]    History of cataract extraction, unspecified laterality    Ganglion of left wrist        REVIEW OF SYSTEMS: Pt unable to offer  General/ Breast/ Skin/ Neuro/ MSK: see HPI  All other systems negative    MEDICATIONS  (STANDING):  aMIOdarone    Tablet 200 milliGRAM(s) Oral daily  apixaban 2.5 milliGRAM(s) Oral two times a day  ascorbic acid 500 milliGRAM(s) Oral daily  aspirin enteric coated 81 milliGRAM(s) Oral daily  cefepime   IVPB 1000 milliGRAM(s) IV Intermittent every 24 hours  chlorhexidine 4% Liquid 1 Application(s) Topical <User Schedule>  cholecalciferol 2000 Unit(s) Oral daily  dexAMETHasone  Injectable 6 milliGRAM(s) IV Push daily  dextrose 40% Gel 15 Gram(s) Oral once  dextrose 5%. 1000 milliLiter(s) (50 mL/Hr) IV Continuous <Continuous>  dextrose 5%. 1000 milliLiter(s) (100 mL/Hr) IV Continuous <Continuous>  dextrose 50% Injectable 25 Gram(s) IV Push once  dextrose 50% Injectable 12.5 Gram(s) IV Push once  escitalopram 15 milliGRAM(s) Oral daily  finasteride 5 milliGRAM(s) Oral daily  glucagon  Injectable 1 milliGRAM(s) IntraMuscular once  insulin glargine Injectable (LANTUS) 8 Unit(s) SubCutaneous at bedtime  insulin lispro (ADMELOG) corrective regimen sliding scale   SubCutaneous three times a day before meals  insulin lispro (ADMELOG) corrective regimen sliding scale   SubCutaneous at bedtime  latanoprost 0.005% Ophthalmic Solution 1 Drop(s) Both EYES at bedtime  multivitamin 1 Tablet(s) Oral daily  mupirocin 2% Ointment 1 Application(s) Both Nostrils two times a day  remdesivir  IVPB 100 milliGRAM(s) IV Intermittent every 24 hours  remdesivir  IVPB   IV Intermittent   simvastatin 20 milliGRAM(s) Oral at bedtime  sodium bicarbonate  Infusion 0.056 mEq/kG/Hr (75 mL/Hr) IV Continuous <Continuous>  tamsulosin 0.4 milliGRAM(s) Oral at bedtime  thiamine 100 milliGRAM(s) Oral daily  vancomycin  IVPB 1000 milliGRAM(s) IV Intermittent every 24 hours    MEDICATIONS  (PRN):  acetaminophen     Tablet .. 650 milliGRAM(s) Oral every 4 hours PRN Temp greater or equal to 38.5C (101.3F)  ALBUTerol    90 MICROgram(s) HFA Inhaler 2 Puff(s) Inhalation every 6 hours PRN Shortness of Breath and/or Wheezing  guaifenesin/dextromethorphan Oral Liquid 10 milliLiter(s) Oral every 4 hours PRN Cough      Allergies    Cipro (Hives)  fish (Unknown)    Intolerances    Pollen, hayfever (Unknown)      SOCIAL HISTORY:  / /single/ ; (+)HHA/ lives in SNF; Former smoker, Denies smoking, ETOH, drugs    FAMILY HISTORY:  FH: myocardial infarction  parents        PHYSICAL EXAM:  Vital Signs Last 24 Hrs  T(C): 36.4 (30 Dec 2021 10:37), Max: 36.5 (30 Dec 2021 09:04)  T(F): 97.5 (30 Dec 2021 10:37), Max: 97.7 (30 Dec 2021 09:04)  HR: 81 (30 Dec 2021 10:37) (70 - 82)  BP: 110/71 (30 Dec 2021 10:37) (97/62 - 110/71)  BP(mean): --  RR: 24 (30 Dec 2021 10:37) (22 - 25)  SpO2: 88% (30 Dec 2021 10:37) (88% - 96%)    NAD / gaurded but stable,  A&Ox3/ Alert/ Confused  cachectic/ MO/ Obese/ frail  WD/ WN/ WG/ Disheveled  Total Care Sport/ Versa Care P500 bed/ [place]     HEENT:  NC/AT, PERRL, EOMI, mucosa moist, throat clear, trachea midline, neck supple    Cardiovascular: RRR (+)m    Respiratory: CTA    Gastrointestinal soft NT/ND (+)BS  (+)PEG (+)ostomy    Neurology  weakened strength & sensation grossly intact/ paraesthesia  nonverbal, no follow commands/ paraplegic    Musculoskeletal:  limited/ FROM, no deformities/ contractures    Vascular: BLE equally warm/ cool,  no cyanosis, clubbing, edema  >LE //BLE edema equal  DP/PT pulses palpable  no acute ischemia noted  hemosiderin staining    Skin:  moist w/ good turgor  frail,  ecchymosis w/o hematoma  serosanguinous drainage  No odor, erythema, increased warmth, tenderness, induration, fluctuance    LABS/ CULTURES/ RADIOLOGY:                        13.6   29.17 )-----------( 290      ( 30 Dec 2021 07:18 )             39.3       144  |  107  |  100  ----------------------------<  195      [12-30-21 @ 07:18]  3.9   |  15  |  3.05        Ca     8.2     [12-30-21 @ 07:18]                Culture - Blood (collected 12-27-21 @ 21:59)  Source: .Blood Blood-Peripheral  Gram Stain (12-29-21 @ 03:57):    Growth in anaerobic bottle: Gram Positive Cocci in Clusters    Growth in aerobic bottle: Gram Positive Cocci in Clusters  Final Report (12-30-21 @ 08:12):    Growth in aerobic and anaerobic bottles: Methicillin Resistant    Staphylococcus aureus    ***Blood Panel PCR results on this specimen are available    approximately 3 hours after the Gram stain result.***    Gram stain, PCR, and/or culture results may notalways    correspond due to difference in methodologies.    ************************************************************    This PCR assay was performed by multiplex PCR. This    Assay tests for 66 bacterial and resistance gene targets.    Please refer to the [facility] test directory    at [email or web address] for details.  Organism: Blood Culture PCR  Methicillin resistant Staphylococcus aureus (12-30-21 @ 08:12)  Organism: Methicillin resistant Staphylococcus aureus (12-30-21 @ 08:12)      -  Ampicillin/Sulbactam: R 16/8      -  Cefazolin: R >16      -  Clindamycin: S <=0.25      -  Daptomycin: S 1      -  Erythromycin: R >4      -  Gentamicin: S <=1 Should not be used as monotherapy      -  Linezolid: S 4      -  Oxacillin: R >2      -  Penicillin: R >8      -  Rifampin: S <=1 Should not be used as monotherapy      -  Tetra/Doxy: S 2      -  Trimethoprim/Sulfamethoxazole: R >2/38      -  Vancomycin: S 2      Method Type: GERALD  Organism: Blood Culture PCR (12-30-21 @ 08:12)      -  Methicillin resistant Staphylococcus aureus (MRSA): Detec      Method Type: PCR    Culture - Blood (collected 12-27-21 @ 21:59)  Source: .Blood Blood-Peripheral  Gram Stain (12-28-21 @ 22:20):    Growth in aerobic bottle: Gram Positive Cocci in Clusters    Growth in anaerobic bottle: Gram Positive Cocci in Clusters  Final Report (12-30-21 @ 08:35):    Growth in aerobic and anaerobic bottles: Methicillin Resistant    Staphylococcus aureus    See previous culture [id number]    Growth in aerobic bottle: Staphylococcus epidermidis Coag Negative    Staphylococcus    Single set isolate, possible contaminant. Contact    Microbiology if susceptibility testing clinically    indicated.    Culture - Blood (collected 12-23-21 @ 18:53)  Source: .Blood Blood-Peripheral  Final Report (12-28-21 @ 19:01):    No Growth Final    Culture - Blood (collected 12-23-21 @ 18:53)  Source: .Blood Blood-Peripheral  Final Report (12-28-21 @ 19:00):    No Growth Final                    A/P:    Wound Consult requested to assist w/ management of    Compression BLE  Consider MOI/PVR, XRay, Duplex, MRI, CT  BLE elevation  Abx per Medicine/ ID  Moisturize intact skin w/ SWEEN cream BID  Nutrition Consult for optimization as tolerated in pt w/ Protein Calorie Malnutirion        Inadequate PO intake        consider MVI & Vit C to promote wound healing        encourage high quality protein, w/ supplements such as ensure/ glucerna  Hyperglycemia - consider HgA1c        FS w/ ISS q6h, consider Endo Consult  Anemia- transfusions, Fe studies  Continue turning and positioning w/ offloading assistive devices as per protocol  Continue w/ Pericare as per protocol  Waffle Cushion to chair when oob to chair  Continue w/ low air loss fluidized bed surface   Care as per medicine, will follow w/ you  Upon discharge f/u as outpatient at [facility] [address] [facility] [phone]  Seen w/ attng and D/w team  Thank you for this consult  Stephany Block PA-C [id number]     Wound SURGERY CONSULT NOTE    HPI:  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  Per family , patient is bedbound , and has been sleeping most of the day for the past few months, with minimal interaction, although at times does perk up and may communicate.  Family was also concerned  about bleeding in the landeros cath , which has been occurring intermittently since it was exchanged on 12/15.  Currently pt w/o N/V/D, sob/dyspnea, or      F/C/S. Wound consult requested to assist w/ management of sacral pressure injury. Pt unable to c/o pain, drainage, odor, color change, or swelling. Incontinent of stool, and has chronic landeros.  offloading and pericare protocols initiated.  Allevyn placed by TSERING while awaiting consult.  Appetite good w/o  weight loss. pt requires assist.    Current Diet: Diet, Regular:   Pureed (PUREED)  Moderately Thick Liquids (MODTHICKLIQS)  Supplement Feeding Modality:  Oral  Glucerna Shake Cans or Servings Per Day:  2       Frequency:  Daily (12-25-21 @ 20:57)      PAST MEDICAL & SURGICAL HISTORY:  Type 2 diabetes mellitus with other specified complication, unspecified whether long term insulin use    Cerebrovascular accident (CVA), unspecified mechanism    Ventricular arrhythmia    Urinary tract infection associated with indwelling urethral catheter, initial encounter    s/p appendectomy    Perforated bowel    s/p Cataract extraction, unspecified laterality    Ganglion of left wrist    REVIEW OF SYSTEMS: Pt unable to offer    MEDICATIONS  (STANDING):  aMIOdarone    Tablet 200 milliGRAM(s) Oral daily  apixaban 2.5 milliGRAM(s) Oral two times a day  ascorbic acid 500 milliGRAM(s) Oral daily  aspirin enteric coated 81 milliGRAM(s) Oral daily  cefepime   IVPB 1000 milliGRAM(s) IV Intermittent every 24 hours  chlorhexidine 4% Liquid 1 Application(s) Topical <User Schedule>  cholecalciferol 2000 Unit(s) Oral daily  dexAMETHasone  Injectable 6 milliGRAM(s) IV Push daily  dextrose 40% Gel 15 Gram(s) Oral once  dextrose 5%. 1000 milliLiter(s) (50 mL/Hr) IV Continuous <Continuous>  dextrose 5%. 1000 milliLiter(s) (100 mL/Hr) IV Continuous <Continuous>  dextrose 50% Injectable 25 Gram(s) IV Push once  dextrose 50% Injectable 12.5 Gram(s) IV Push once  escitalopram 15 milliGRAM(s) Oral daily  finasteride 5 milliGRAM(s) Oral daily  glucagon  Injectable 1 milliGRAM(s) IntraMuscular once  insulin glargine Injectable (LANTUS) 8 Unit(s) SubCutaneous at bedtime  insulin lispro (ADMELOG) corrective regimen sliding scale   SubCutaneous three times a day before meals  insulin lispro (ADMELOG) corrective regimen sliding scale   SubCutaneous at bedtime  latanoprost 0.005% Ophthalmic Solution 1 Drop(s) Both EYES at bedtime  multivitamin 1 Tablet(s) Oral daily  mupirocin 2% Ointment 1 Application(s) Both Nostrils two times a day  remdesivir  IVPB 100 milliGRAM(s) IV Intermittent every 24 hours  remdesivir  IVPB   IV Intermittent   simvastatin 20 milliGRAM(s) Oral at bedtime  sodium bicarbonate  Infusion 0.056 mEq/kG/Hr (75 mL/Hr) IV Continuous <Continuous>  tamsulosin 0.4 milliGRAM(s) Oral at bedtime  thiamine 100 milliGRAM(s) Oral daily  vancomycin  IVPB 1000 milliGRAM(s) IV Intermittent every 24 hours    MEDICATIONS  (PRN):  acetaminophen     Tablet .. 650 milliGRAM(s) Oral every 4 hours PRN Temp greater or equal to 38.5C (101.3F)  ALBUTerol    90 MICROgram(s) HFA Inhaler 2 Puff(s) Inhalation every 6 hours PRN Shortness of Breath and/or Wheezing  guaifenesin/dextromethorphan Oral Liquid 10 milliLiter(s) Oral every 4 hours PRN Cough      Allergies  Cipro (Hives)  fish (Unknown)    Intolerances  Pollen, hayfever (Unknown)      SOCIAL HISTORY:  (+)HHA/ no smoking, ETOH, drugs    FAMILY HISTORY: no h/o skin/ wound healing problems  FH: myocardial infarction-parents      PHYSICAL EXAM:  Vital Signs Last 24 Hrs  T(C): 36.4 (30 Dec 2021 10:37), Max: 36.5 (30 Dec 2021 09:04)  T(F): 97.5 (30 Dec 2021 10:37), Max: 97.7 (30 Dec 2021 09:04)  HR: 81 (30 Dec 2021 10:37) (70 - 82)  BP: 110/71 (30 Dec 2021 10:37) (97/62 - 110/71)  BP(mean): --  RR: 24 (30 Dec 2021 10:37) (22 - 25)  SpO2: 88% (30 Dec 2021 10:37) (88% - 96%)    NAD /  Alert/  frail  Versa Care P500 bed   HEENT:  NC/AT, PERRL, EOMI, mucosa moist, throat clear, trachea midline, neck supple  Cardiovascular: RRR   Respiratory: CTA, HiFLo O2  Gastrointestinal: soft NT/ND (+)BS    : (+)Landeros  Neurology: nonverbal, no follow commands  Musculoskeletal: stiff & limited ROM  Vascular: BLE equally warm,  BLE edema equal  no acute ischemia noted  Skin:  pale, thin, frail,  ecchymosis w/o hematoma   sacrum and b/l buttocks 9.5cmx 11cm x0.2cm.     mostly pale granular w/ fibrinous exudate and small area of red maroon tissue.     periwound w/ fading deep purple tone-     scant serosanguinous drainage  No odor, erythema, increased warmth, tenderness, induration, fluctuance    LABS/ CULTURES/ RADIOLOGY:                        13.6   29.17 )-----------( 290      ( 30 Dec 2021 07:18 )             39.3       144  |  107  |  100  ----------------------------<  195      [12-30-21 @ 07:18]  3.9   |  15  |  3.05        Ca     8.2     [12-30-21 @ 07:18]    A1C with Estimated Average Glucose 6.3%(12.24.21 @ 09:54)    Culture - Blood (collected 12-27-21 @ 21:59)  Source: .Blood Blood-Peripheral  Gram Stain (12-29-21 @ 03:57):    Growth in anaerobic bottle: Gram Positive Cocci in Clusters    Growth in aerobic bottle: Gram Positive Cocci in Clusters  Final Report (12-30-21 @ 08:12):    Growth in aerobic and anaerobic bottles: Methicillin Resistant    Staphylococcus aureus    ***Blood Panel PCR results on this specimen are available    approximately 3 hours after the Gram stain result.***    Gram stain, PCR, and/or culture results may notalways    correspond due to difference in methodologies.    ************************************************************    This PCR assay was performed by multiplex PCR. This    Assay tests for 66 bacterial and resistance gene targets.    Please refer to the [facility] test directory    at [email or web address] for details.  Organism: Blood Culture PCR  Methicillin resistant Staphylococcus aureus (12-30-21 @ 08:12)  Organism: Methicillin resistant Staphylococcus aureus (12-30-21 @ 08:12)      -  Ampicillin/Sulbactam: R 16/8      -  Cefazolin: R >16      -  Clindamycin: S <=0.25      -  Daptomycin: S 1      -  Erythromycin: R >4      -  Gentamicin: S <=1 Should not be used as monotherapy      -  Linezolid: S 4      -  Oxacillin: R >2      -  Penicillin: R >8      -  Rifampin: S <=1 Should not be used as monotherapy      -  Tetra/Doxy: S 2      -  Trimethoprim/Sulfamethoxazole: R >2/38      -  Vancomycin: S 2      Method Type: GERALD  Organism: Blood Culture PCR (12-30-21 @ 08:12)      -  Methicillin resistant Staphylococcus aureus (MRSA): Detec      Method Type: PCR    Culture - Blood (collected 12-27-21 @ 21:59)  Source: .Blood Blood-Peripheral  Gram Stain (12-28-21 @ 22:20):    Growth in aerobic bottle: Gram Positive Cocci in Clusters    Growth in anaerobic bottle: Gram Positive Cocci in Clusters  Final Report (12-30-21 @ 08:35):    Growth in aerobic and anaerobic bottles: Methicillin Resistant    Staphylococcus aureus    See previous culture [id number]    Growth in aerobic bottle: Staphylococcus epidermidis Coag Negative    Staphylococcus    Single set isolate, possible contaminant. Contact    Microbiology if susceptibility testing clinically    indicated.    Culture - Blood (collected 12-23-21 @ 18:53)  Source: .Blood Blood-Peripheral  Final Report (12-28-21 @ 19:01):    No Growth Final    Culture - Blood (collected 12-23-21 @ 18:53)  Source: .Blood Blood-Peripheral  Final Report (12-28-21 @ 19:00):    No Growth Final

## 2021-12-30 NOTE — PROGRESS NOTE ADULT - ASSESSMENT
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, T2DM  s/p indwelling landeros due to urinary retention  presents for respiratory distress for the past day now with COVID 19+    Renal failure  Likely CKD   Baseline scr 1.5-1.8  Renal function worsening possibly sec to hemodynamic changes vs COVID   Pt non oliguric  CHeck URine lytes  Discussed with medical attending -- will give trial of gentle hydration today ( 1/2ns with 75 meq of bicarb at 75 cc for 12 hours)  Avoid further nephrotoxics, NSAIDS RCA    COVID   Continue mgn per medicine and ID  s/p Remdisavir    Hematuria  HX of recurrent UTI  On Chronic Landeros  Follow up Urology

## 2021-12-30 NOTE — PROGRESS NOTE ADULT - SUBJECTIVE AND OBJECTIVE BOX
SUBJECTIVE / OVERNIGHT EVENTS:  eyes closed but will open in response to verbal/tactile stimuli  comfortable on hi-flow 100% FiO2, saturating low 90s    Vital Signs Last 24 Hrs  T(C): 36.4 (30 Dec 2021 04:39), Max: 37.1 (29 Dec 2021 10:58)  T(F): 97.5 (30 Dec 2021 04:39), Max: 98.8 (29 Dec 2021 10:58)  HR: 82 (30 Dec 2021 04:39) (73 - 85)  BP: 101/72 (30 Dec 2021 04:39) (97/62 - 101/72)  BP(mean): --  RR: 24 (30 Dec 2021 04:39) (20 - 24)  SpO2: 93% (30 Dec 2021 04:39) (91% - 96%)    I&O's Summary    12-29-21 @ 07:01  -  12-30-21 @ 07:00  --------------------------------------------------------  IN: 80 mL / OUT: 700 mL / NET: -620 mL        PHYSICAL EXAM:  GENERAL: NAD, well-developed, on hi-flow  HEAD:  Atraumatic, Normocephalic  EYES: EOMI, PERRLA, conjunctiva and sclera clear  NECK: Supple, No JVD  CHEST/LUNG: mild decrease breath sounds bilaterally; No wheeze   HEART: Regular rate and rhythm; No murmurs, rubs, or gallops  ABDOMEN: Soft, Nontender, Nondistended; Bowel sounds present  : Cortes in place, danielle color urine, neg CVAT   Neuro: AAOx0, eyes open, awake but no meaningful interaction,   EXTREMITIES:  2+ Peripheral Pulses, No clubbing, cyanosis, +B/L UE edema.  LE b/l less edematous   SKIN: No rashes or lesions       LABS:                        13.6   29.17 )-----------( 290      ( 30 Dec 2021 07:18 )             39.3     12-30    144  |  107  |  100<H>  ----------------------------<  195<H>  3.9   |  15<L>  |  3.05<H>    Ca    8.2<L>      30 Dec 2021 07:18        CAPILLARY BLOOD GLUCOSE      POCT Blood Glucose.: 228 mg/dL (30 Dec 2021 07:47)  POCT Blood Glucose.: 203 mg/dL (29 Dec 2021 21:05)  POCT Blood Glucose.: 254 mg/dL (29 Dec 2021 16:31)  POCT Blood Glucose.: 238 mg/dL (29 Dec 2021 13:48)  POCT Blood Glucose.: 246 mg/dL (29 Dec 2021 11:40)  POCT Blood Glucose.: 302 mg/dL (29 Dec 2021 10:48)            RADIOLOGY & ADDITIONAL TESTS:    Imaging Personally Reviewed:  [x] YES  [ ] NO    Will obtain old records:  [ ] YES  [x] NO SUBJECTIVE / OVERNIGHT EVENTS:  eyes closed but will open in response to verbal/tactile stimuli  comfortable on hi-flow 100% FiO2, saturating low 90s  cr worsened to 3.05    Vital Signs Last 24 Hrs  T(C): 36.4 (30 Dec 2021 04:39), Max: 37.1 (29 Dec 2021 10:58)  T(F): 97.5 (30 Dec 2021 04:39), Max: 98.8 (29 Dec 2021 10:58)  HR: 82 (30 Dec 2021 04:39) (73 - 85)  BP: 101/72 (30 Dec 2021 04:39) (97/62 - 101/72)  BP(mean): --  RR: 24 (30 Dec 2021 04:39) (20 - 24)  SpO2: 93% (30 Dec 2021 04:39) (91% - 96%)    I&O's Summary    12-29-21 @ 07:01  -  12-30-21 @ 07:00  --------------------------------------------------------  IN: 80 mL / OUT: 700 mL / NET: -620 mL        PHYSICAL EXAM:  GENERAL: NAD, well-developed, on hi-flow  HEAD:  Atraumatic, Normocephalic  EYES: EOMI, PERRLA, conjunctiva and sclera clear  NECK: Supple, No JVD  CHEST/LUNG: mild decrease breath sounds bilaterally; No wheeze   HEART: Regular rate and rhythm; No murmurs, rubs, or gallops  ABDOMEN: Soft, Nontender, Nondistended; Bowel sounds present  : Cortes in place, danielle color urine, neg CVAT   Neuro: AAOx0, eyes open, awake but no meaningful interaction,   EXTREMITIES:  2+ Peripheral Pulses, No clubbing, cyanosis, +B/L UE edema.  LE b/l less edematous   SKIN: No rashes or lesions       LABS:                        13.6   29.17 )-----------( 290      ( 30 Dec 2021 07:18 )             39.3     12-30    144  |  107  |  100<H>  ----------------------------<  195<H>  3.9   |  15<L>  |  3.05<H>    Ca    8.2<L>      30 Dec 2021 07:18        CAPILLARY BLOOD GLUCOSE      POCT Blood Glucose.: 228 mg/dL (30 Dec 2021 07:47)  POCT Blood Glucose.: 203 mg/dL (29 Dec 2021 21:05)  POCT Blood Glucose.: 254 mg/dL (29 Dec 2021 16:31)  POCT Blood Glucose.: 238 mg/dL (29 Dec 2021 13:48)  POCT Blood Glucose.: 246 mg/dL (29 Dec 2021 11:40)  POCT Blood Glucose.: 302 mg/dL (29 Dec 2021 10:48)            RADIOLOGY & ADDITIONAL TESTS:    Imaging Personally Reviewed:  [x] YES  [ ] NO    Will obtain old records:  [ ] YES  [x] NO

## 2021-12-30 NOTE — PROGRESS NOTE ADULT - PROBLEM SELECTOR PLAN 4
- his Cr is somewhat at his baseline.   - avoid nephrotoxins. will continue to monitor    Arm Edema, so started gentle Lasix.   - mild Cr elevation on Low dose Lasix 20 mg qdaily (though arm edema better). will hold  - he received 2 doses.   - TTE without significant LV dysfunction - his Cr is somewhat at his baseline.   - avoid nephrotoxins. will continue to monitor  - gentle IVF NS 12/30/21 given worsened Cr, d/wed with nephrology    Arm Edema, so started gentle Lasix.   - mild Cr elevation on Low dose Lasix 20 mg qdaily (though arm edema better). will hold  - he received 2 doses.   - TTE without significant LV dysfunction

## 2021-12-30 NOTE — PROGRESS NOTE ADULT - SUBJECTIVE AND OBJECTIVE BOX
[facility], Division of Infectious Diseases  YOCASTA Pedraza Y. Patel, S. Shah, G. Casimir  [phone]  ([phone] - weekdays after 5pm and weekends)    Name: BRIA COREY  Age/Gender: 89y Male  MRN: [id number]    Interval History:  Patient seen this morning.   Notes reviewed.   Afebrile.   leukocytosis worsening    Allergies: Cipro (Hives)  fish (Unknown)      Objective:  Vitals:   T(F): 97.5 (12-30-21 @ 10:37), Max: 97.7 (12-30-21 @ 09:04)  HR: 81 (12-30-21 @ 10:37) (73 - 82)  BP: 110/71 (12-30-21 @ 10:37) (97/62 - 110/71)  RR: 24 (12-30-21 @ 10:37) (22 - 25)  SpO2: 88% (12-30-21 @ 10:37) (88% - 96%)  Physical Examination:  General: ill appearing  HEENT: anicteric  Cardio: S1, S2 present, normal rate  Resp: on HFNC  Abd: soft, ND  Ext: no LE edema, + b/l upper ext edema  Skin: multiple areas of ecchymoses and healing scabs  Lines:    Laboratory Studies:  CBC:                       13.6   29.17 )-----------( 290      ( 30 Dec 2021 07:18 )             39.3     WBC Trend:  29.17 12-30-21 @ 07:18  19.14 12-29-21 @ 09:50  13.75 12-28-21 @ 06:14  15.35 12-27-21 @ 21:44  17.33 12-27-21 @ 14:40  13.61 12-26-21 @ 06:04  10.56 12-25-21 @ 06:34  7.20 12-24-21 @ 05:57  11.97 12-23-21 @ 16:22    CMP: 12-30    144  |  107  |  100<H>  ----------------------------<  195<H>  3.9   |  15<L>  |  3.05<H>    Ca    8.2<L>      30 Dec 2021 07:18          Vancomycin Level, Trough: 10.5 ug/mL (12-29-21 @ 09:50)      Microbiology: reviewed     Culture - Blood (collected 12-27-21 @ 21:59)  Source: .Blood Blood-Peripheral  Gram Stain (12-29-21 @ 03:57):    Growth in anaerobic bottle: Gram Positive Cocci in Clusters    Growth in aerobic bottle: Gram Positive Cocci in Clusters  Final Report (12-30-21 @ 08:12):    Growth in aerobic and anaerobic bottles: Methicillin Resistant    Staphylococcus aureus    ***Blood Panel PCR results on this specimen are available    approximately 3 hours after the Gram stain result.***    Gram stain, PCR, and/or culture results may notalways    correspond due to difference in methodologies.    ************************************************************    This PCR assay was performed by multiplex PCR. This    Assay tests for 66 bacterial and resistance gene targets.    Please refer to the [facility] Labs test directory    at [email or web address] for details.  Organism: Blood Culture PCR  Methicillin resistant Staphylococcus aureus (12-30-21 @ 08:12)  Organism: Methicillin resistant Staphylococcus aureus (12-30-21 @ 08:12)      -  Ampicillin/Sulbactam: R 16/8      -  Cefazolin: R >16      -  Clindamycin: S <=0.25      -  Daptomycin: S 1      -  Erythromycin: R >4      -  Gentamicin: S <=1 Should not be used as monotherapy      -  Linezolid: S 4      -  Oxacillin: R >2      -  Penicillin: R >8      -  Rifampin: S <=1 Should not be used as monotherapy      -  Tetra/Doxy: S 2      -  Trimethoprim/Sulfamethoxazole: R >2/38      -  Vancomycin: S 2      Method Type: GERALD  Organism: Blood Culture PCR (12-30-21 @ 08:12)      -  Methicillin resistant Staphylococcus aureus (MRSA): Detec      Method Type: PCR    Culture - Blood (collected 12-27-21 @ 21:59)  Source: .Blood Blood-Peripheral  Gram Stain (12-28-21 @ 22:20):    Growth in aerobic bottle: Gram Positive Cocci in Clusters    Growth in anaerobic bottle: Gram Positive Cocci in Clusters  Final Report (12-30-21 @ 08:35):    Growth in aerobic and anaerobic bottles: Methicillin Resistant    Staphylococcus aureus    See previous culture [id number]    Growth in aerobic bottle: Staphylococcus epidermidis Coag Negative    Staphylococcus    Single set isolate, possible contaminant. Contact    Microbiology if susceptibility testing clinically    indicated.    Culture - Urine (collected 12-26-21 @ 09:30)  Source: Clean Catch Clean Catch (Midstream)  Final Report (12-27-21 @ 07:40):    <10,000 CFU/mL Normal Urogenital Amber    Culture - Blood (collected 12-23-21 @ 18:53)  Source: .Blood Blood-Peripheral  Final Report (12-28-21 @ 19:01):    No Growth Final    Culture - Blood (collected 12-23-21 @ 18:53)  Source: .Blood Blood-Peripheral  Final Report (12-28-21 @ 19:00):    No Growth Final      Radiology: reviewed     Medications:  acetaminophen     Tablet .. 650 milliGRAM(s) Oral every 4 hours PRN  ALBUTerol    90 MICROgram(s) HFA Inhaler 2 Puff(s) Inhalation every 6 hours PRN  aMIOdarone    Tablet 200 milliGRAM(s) Oral daily  apixaban 2.5 milliGRAM(s) Oral every 12 hours  ascorbic acid 500 milliGRAM(s) Oral daily  aspirin enteric coated 81 milliGRAM(s) Oral daily  cefepime   IVPB 1000 milliGRAM(s) IV Intermittent every 24 hours  cholecalciferol 2000 Unit(s) Oral daily  dexAMETHasone  Injectable 6 milliGRAM(s) IV Push daily  dextrose 40% Gel 15 Gram(s) Oral once  dextrose 5%. 1000 milliLiter(s) IV Continuous <Continuous>  dextrose 5%. 1000 milliLiter(s) IV Continuous <Continuous>  dextrose 50% Injectable 25 Gram(s) IV Push once  dextrose 50% Injectable 12.5 Gram(s) IV Push once  escitalopram 15 milliGRAM(s) Oral daily  finasteride 5 milliGRAM(s) Oral daily  glucagon  Injectable 1 milliGRAM(s) IntraMuscular once  guaifenesin/dextromethorphan Oral Liquid 10 milliLiter(s) Oral every 4 hours PRN  insulin glargine Injectable (LANTUS) 4 Unit(s) SubCutaneous at bedtime  insulin lispro (ADMELOG) corrective regimen sliding scale   SubCutaneous three times a day before meals  insulin lispro (ADMELOG) corrective regimen sliding scale   SubCutaneous at bedtime  latanoprost 0.005% Ophthalmic Solution 1 Drop(s) Both EYES at bedtime  multivitamin 1 Tablet(s) Oral daily  remdesivir  IVPB 100 milliGRAM(s) IV Intermittent every 24 hours  remdesivir  IVPB   IV Intermittent   simvastatin 20 milliGRAM(s) Oral at bedtime  tamsulosin 0.4 milliGRAM(s) Oral at bedtime  thiamine 100 milliGRAM(s) Oral daily  vancomycin  IVPB 1000 milliGRAM(s) IV Intermittent every 24 hours    Antimicrobials:  cefepime   IVPB 1000 milliGRAM(s) IV Intermittent every 24 hours  remdesivir  IVPB 100 milliGRAM(s) IV Intermittent every 24 hours  remdesivir  IVPB   IV Intermittent   vancomycin  IVPB 1000 milliGRAM(s) IV Intermittent every 24 hours

## 2021-12-30 NOTE — PROGRESS NOTE ADULT - SUBJECTIVE AND OBJECTIVE BOX
[facility] NEPHROLOGY PRACTICE   MD PABLO SEGOVIA MD RUORU WONG, PA    TEL:  OFFICE: [phone]  DR PEARCE CELL: [phone]  GAETANO RIBEIRO CELL: [phone]  DR. HOPPER CELL: [phone]      FROM 5 PM - 7 AM PLEASE CALL ANSWERING SERVICE: [phone]    RENAL FOLLOW UP NOTE--Date of Service 12-30-21 @ 12:09  --------------------------------------------------------------------------------  HPI:      Pt seen and examined at bedside.       PAST HISTORY  --------------------------------------------------------------------------------  No significant changes to PMH, PSH, FHx, SHx, unless otherwise noted    ALLERGIES & MEDICATIONS  --------------------------------------------------------------------------------  Allergies    Cipro (Hives)  fish (Unknown)    Intolerances    Pollen, hayfever (Unknown)    Standing Inpatient Medications  aMIOdarone    Tablet 200 milliGRAM(s) Oral daily  apixaban 2.5 milliGRAM(s) Oral every 12 hours  ascorbic acid 500 milliGRAM(s) Oral daily  aspirin enteric coated 81 milliGRAM(s) Oral daily  cefepime   IVPB 1000 milliGRAM(s) IV Intermittent every 24 hours  cholecalciferol 2000 Unit(s) Oral daily  dexAMETHasone  Injectable 6 milliGRAM(s) IV Push daily  dextrose 40% Gel 15 Gram(s) Oral once  dextrose 5%. 1000 milliLiter(s) IV Continuous <Continuous>  dextrose 5%. 1000 milliLiter(s) IV Continuous <Continuous>  dextrose 50% Injectable 25 Gram(s) IV Push once  dextrose 50% Injectable 12.5 Gram(s) IV Push once  escitalopram 15 milliGRAM(s) Oral daily  finasteride 5 milliGRAM(s) Oral daily  glucagon  Injectable 1 milliGRAM(s) IntraMuscular once  insulin glargine Injectable (LANTUS) 4 Unit(s) SubCutaneous at bedtime  insulin lispro (ADMELOG) corrective regimen sliding scale   SubCutaneous three times a day before meals  insulin lispro (ADMELOG) corrective regimen sliding scale   SubCutaneous at bedtime  latanoprost 0.005% Ophthalmic Solution 1 Drop(s) Both EYES at bedtime  multivitamin 1 Tablet(s) Oral daily  remdesivir  IVPB 100 milliGRAM(s) IV Intermittent every 24 hours  remdesivir  IVPB   IV Intermittent   simvastatin 20 milliGRAM(s) Oral at bedtime  sodium bicarbonate  Infusion 0.056 mEq/kG/Hr IV Continuous <Continuous>  tamsulosin 0.4 milliGRAM(s) Oral at bedtime  thiamine 100 milliGRAM(s) Oral daily  vancomycin  IVPB 1000 milliGRAM(s) IV Intermittent every 24 hours    PRN Inpatient Medications  acetaminophen     Tablet .. 650 milliGRAM(s) Oral every 4 hours PRN  ALBUTerol    90 MICROgram(s) HFA Inhaler 2 Puff(s) Inhalation every 6 hours PRN  guaifenesin/dextromethorphan Oral Liquid 10 milliLiter(s) Oral every 4 hours PRN      REVIEW OF SYSTEMS  --------------------------------------------------------------------------------  General: no fever  MSK: no edema     VITALS/PHYSICAL EXAM  --------------------------------------------------------------------------------  T(C): 36.4 (12-30-21 @ 10:37), Max: 36.5 (12-30-21 @ 09:04)  HR: 81 (12-30-21 @ 10:37) (70 - 82)  BP: 110/71 (12-30-21 @ 10:37) (97/62 - 110/71)  RR: 24 (12-30-21 @ 10:37) (22 - 25)  SpO2: 88% (12-30-21 @ 10:37) (88% - 96%)  Wt(kg): --        12-29-21 @ 07:01  -  12-30-21 @ 07:00  --------------------------------------------------------  IN: 80 mL / OUT: 700 mL / NET: -620 mL      Physical Exam:  	Gen: NAD on high flow  	HEENT: MMM  	Pulm: Coarse breath sounds  B/L  	CV: S1S2  	Abd: Soft, +BS  	Ext: No LE edema B/L                      Neuro: lethrgic  	Skin: Warm and Dry   	Vascular access: NO HD catheter           TOMER landeros  LABS/STUDIES  --------------------------------------------------------------------------------              13.6   29.17 >-----------<  290      [12-30-21 @ 07:18]              39.3     144  |  107  |  100  ----------------------------<  195      [12-30-21 @ 07:18]  3.9   |  15  |  3.05        Ca     8.2     [12-30-21 @ 07:18]            Creatinine Trend:  SCr 3.05 [12-30 @ 07:18]  SCr 2.39 [12-29 @ 12:23]  SCr 2.13 [12-29 @ 09:50]  SCr 2.05 [12-28 @ 06:12]  SCr 1.95 [12-27 @ 21:44]    Urinalysis - [12-26-21 @ 06:04]      Color Yellow / Appearance Slightly Turbid / SG 1.025 / pH 6.0      Gluc Negative / Ketone Negative  / Bili Negative / Urobili Negative       Blood Moderate / Protein 30 mg/dL / Leuk Est Negative / Nitrite Negative      RBC 13 / WBC 12 / Hyaline 4 / Gran  / Sq Epi  / Non Sq Epi 8 / Bacteria Few      Ferritin 990      [12-28-21 @ 09:02]    HBsAg Nonreact      [12-25-21 @ 09:17]  HCV 0.86, Nonreact      [12-25-21 @ 09:17]

## 2021-12-30 NOTE — PROGRESS NOTE ADULT - ASSESSMENT
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   Per daughter ,patients home attendant noted that patient was breathing heavy on day of admission. Per family , patient has been having chest congestion for the past month , intermittent coughing with white sputum, occasional dysphagia  patient is bedbound , and has been sleeping most of the day for the past few months, with minimal interaction, although at times does perk up and may communicate    MRSA bacteremia  blood cx 12/27 positive for MRSA in 4/4 bottles  unclear source at this time, possibly skin wound vs PNA  TTE w/o mention of vegetations  c/w vancomycin 1g every 24 hours, goal trough 15-20  obtain trough prior to dose on 12/30 (evening), if >trough >20 hold evening dose and check random level w/ AM labs; if trough <15 increase to 1.25g every 24 hours  f/u repeat blood cultures 12/30   poor prognosis    Covid PNA, acute hypoxic resp failure  - Pt with low grade temp on admission 100F, WBC 11.  Initially requiring 4L NC.  CTA chest no PE, (+) peripheral GGOs and b/l endobronchial secretions.  s/p landeros placement for urinary retention  - Pt on home AC, apixiban.  No PE on CTA chest.  c/w remdesivir x 5 day course  c/w dexamethasone 6mg IV qdaily x 10 days  pt w/ s/p tocilizumab 12/27  CXR w/ possible LLL infiltrate  c/w cefepime to complete 5 day course for possible superimposed aspiration PNA  Wean O2 as tolerated  monitor inflammatory markers    Henrry Mejia M.D.  [facility], Division of Infectious Diseases  [phone]  After 5pm on weekdays and all day on weekends - please call [phone]

## 2021-12-31 NOTE — PROGRESS NOTE ADULT - SUBJECTIVE AND OBJECTIVE BOX
SUBJECTIVE / OVERNIGHT EVENTS:  eyes closed but will open in response to verbal/tactile stimuli  comfortable on hi-flow 100% FiO2, saturating low 90s  cr worsened to 4.38    Vital Signs Last 24 Hrs  T(C): 36.4 (30 Dec 2021 04:39), Max: 37.1 (29 Dec 2021 10:58)  T(F): 97.5 (30 Dec 2021 04:39), Max: 98.8 (29 Dec 2021 10:58)  HR: 82 (30 Dec 2021 04:39) (73 - 85)  BP: 101/72 (30 Dec 2021 04:39) (97/62 - 101/72)  BP(mean): --  RR: 24 (30 Dec 2021 04:39) (20 - 24)  SpO2: 93% (30 Dec 2021 04:39) (91% - 96%)    I&O's Summary    12-29-21 @ 07:01  -  12-30-21 @ 07:00  --------------------------------------------------------  IN: 80 mL / OUT: 700 mL / NET: -620 mL        PHYSICAL EXAM:  GENERAL: NAD, on hi-flow  HEAD:  Atraumatic, Normocephalic  EYES: EOMI, PERRLA, conjunctiva and sclera clear  NECK: Supple, No JVD  CHEST/LUNG: mild decrease breath sounds bilaterally; No wheeze   HEART: Regular rate and rhythm; No murmurs, rubs, or gallops  ABDOMEN: Soft, Nontender, Nondistended; Bowel sounds present  : Cortes in place, danielle color urine, neg CVAT   Neuro: AAOx0, eyes open, awake but no meaningful interaction,   EXTREMITIES:  2+ Peripheral Pulses, No clubbing, cyanosis, +B/L UE edema.  LE b/l less edematous   SKIN: (+)sacral/buttock wounds with granular tissue; no malodorous discharge       LABS:                        13.0   35.26 )-----------( 287      ( 31 Dec 2021 13:01 )             37.3     12-31    147<H>  |  110<H>  |  119<H>  ----------------------------<  220<H>  4.7   |  15<L>  |  4.38<H>    Ca    7.8<L>      31 Dec 2021 13:01    TPro  5.4<L>  /  Alb  2.0<L>  /  TBili  0.9  /  DBili  x   /  AST  140<H>  /  ALT  117<H>  /  AlkPhos  186<H>  12-31      CAPILLARY BLOOD GLUCOSE      POCT Blood Glucose.: 214 mg/dL (31 Dec 2021 11:21)  POCT Blood Glucose.: 194 mg/dL (31 Dec 2021 08:02)  POCT Blood Glucose.: 248 mg/dL (30 Dec 2021 21:50)  POCT Blood Glucose.: 238 mg/dL (30 Dec 2021 17:11)            RADIOLOGY & ADDITIONAL TESTS:    Imaging Personally Reviewed:  [x] YES  [ ] NO    Will obtain old records:  [ ] YES  [x] NO

## 2021-12-31 NOTE — PROGRESS NOTE ADULT - SUBJECTIVE AND OBJECTIVE BOX
[facility], Division of Infectious Diseases  YOCASTA Pedraza Y. Patel, S. Shah, G. Casimir  [phone]  ([phone] - weekdays after 5pm and weekends)    Name: BRIA COREY  Age/Gender: 89y Male  MRN: [id number]    Interval History:  Patient seen this morning.   Notes reviewed.   Afebrile.     Allergies: Cipro (Hives)  fish (Unknown)      Objective:  Vitals:   T(F): 97.5 (12-31-21 @ 10:57), Max: 97.5 (12-31-21 @ 01:05)  HR: 79 (12-31-21 @ 10:57) (79 - 90)  BP: 97/58 (12-31-21 @ 10:57) (97/58 - 112/71)  RR: 21 (12-31-21 @ 10:57) (20 - 28)  SpO2: 99% (12-31-21 @ 10:57) (86% - 99%)  Physical Examination:  General: ill appearing  HEENT: anicteric  Cardio: S1, S2 present, normal rate  Resp: on HFNC  Abd: soft, ND  Ext: no LE edema, + b/l upper ext edema  Skin: multiple areas of ecchymoses and healing scabs  Lines:    Laboratory Studies:  CBC:                       13.0   35.26 )-----------( 287      ( 31 Dec 2021 13:01 )             37.3     WBC Trend:  35.26 12-31-21 @ 13:01  29.17 12-30-21 @ 07:18  19.14 12-29-21 @ 09:50  13.75 12-28-21 @ 06:14  15.35 12-27-21 @ 21:44  17.33 12-27-21 @ 14:40  13.61 12-26-21 @ 06:04  10.56 12-25-21 @ 06:34    CMP: 12-31    147<H>  |  110<H>  |  119<H>  ----------------------------<  220<H>  4.7   |  15<L>  |  4.38<H>    Ca    7.8<L>      31 Dec 2021 13:01    TPro  5.4<L>  /  Alb  2.0<L>  /  TBili  0.9  /  DBili  x   /  AST  140<H>  /  ALT  117<H>  /  AlkPhos  186<H>  12-31      LIVER FUNCTIONS - ( 31 Dec 2021 13:01 )  Alb: 2.0 g/dL / Pro: 5.4 g/dL / ALK PHOS: 186 U/L / ALT: 117 U/L / AST: 140 U/L / GGT: x           Vancomycin Level, Trough: 24.9 ug/mL (12-31-21 @ 10:17)  Vancomycin Level, Trough: 18.2 ug/mL (12-30-21 @ 19:12)  Vancomycin Level, Trough: 10.5 ug/mL (12-29-21 @ 09:50)      Microbiology: reviewed     Culture - Blood (collected 12-30-21 @ 10:41)  Source: .Blood Blood-Peripheral  Preliminary Report (12-31-21 @ 11:01):    No growth to date.    Culture - Blood (collected 12-30-21 @ 10:41)  Source: .Blood Blood-Peripheral  Preliminary Report (12-31-21 @ 11:01):    No growth to date.    Culture - Blood (collected 12-27-21 @ 21:59)  Source: .Blood Blood-Peripheral  Gram Stain (12-29-21 @ 03:57):    Growth in anaerobic bottle: Gram Positive Cocci in Clusters    Growth in aerobic bottle: Gram Positive Cocci in Clusters  Final Report (12-30-21 @ 08:12):    Growth in aerobic and anaerobic bottles: Methicillin Resistant    Staphylococcus aureus    ***Blood Panel PCR results on this specimen are available    approximately 3 hours after the Gram stain result.***    Gram stain, PCR, and/or culture results may notalways    correspond due to difference in methodologies.    ************************************************************    This PCR assay was performed by multiplex PCR. This    Assay tests for 66 bacterial and resistance gene targets.    Please refer to the [facility] Labs test directory    at [email or web address] for details.  Organism: Blood Culture PCR  Methicillin resistant Staphylococcus aureus (12-30-21 @ 08:12)  Organism: Methicillin resistant Staphylococcus aureus (12-30-21 @ 08:12)      -  Ampicillin/Sulbactam: R 16/8      -  Cefazolin: R >16      -  Clindamycin: S <=0.25      -  Daptomycin: S 1      -  Erythromycin: R >4      -  Gentamicin: S <=1 Should not be used as monotherapy      -  Linezolid: S 4      -  Oxacillin: R >2      -  Penicillin: R >8      -  Rifampin: S <=1 Should not be used as monotherapy      -  Tetra/Doxy: S 2      -  Trimethoprim/Sulfamethoxazole: R >2/38      -  Vancomycin: S 2      Method Type: GERALD  Organism: Blood Culture PCR (12-30-21 @ 08:12)      -  Methicillin resistant Staphylococcus aureus (MRSA): Detec      Method Type: PCR    Culture - Blood (collected 12-27-21 @ 21:59)  Source: .Blood Blood-Peripheral  Gram Stain (12-28-21 @ 22:20):    Growth in aerobic bottle: Gram Positive Cocci in Clusters    Growth in anaerobic bottle: Gram Positive Cocci in Clusters  Final Report (12-30-21 @ 08:35):    Growth in aerobic and anaerobic bottles: Methicillin Resistant    Staphylococcus aureus    See previous culture [id number]    Growth in aerobic bottle: Staphylococcus epidermidis Coag Negative    Staphylococcus    Single set isolate, possible contaminant. Contact    Microbiology if susceptibility testing clinically    indicated.    Culture - Urine (collected 12-26-21 @ 09:30)  Source: Clean Catch Clean Catch (Midstream)  Final Report (12-27-21 @ 07:40):    <10,000 CFU/mL Normal Urogenital Amber    Culture - Blood (collected 12-23-21 @ 18:53)  Source: .Blood Blood-Peripheral  Final Report (12-28-21 @ 19:01):    No Growth Final    Culture - Blood (collected 12-23-21 @ 18:53)  Source: .Blood Blood-Peripheral  Final Report (12-28-21 @ 19:00):    No Growth Final      Radiology: reviewed     Medications:  acetaminophen     Tablet .. 650 milliGRAM(s) Oral every 4 hours PRN  ALBUTerol    90 MICROgram(s) HFA Inhaler 2 Puff(s) Inhalation every 6 hours PRN  aMIOdarone    Tablet 200 milliGRAM(s) Oral daily  ascorbic acid 500 milliGRAM(s) Oral daily  aspirin enteric coated 81 milliGRAM(s) Oral daily  cefepime   IVPB 1000 milliGRAM(s) IV Intermittent every 24 hours  chlorhexidine 4% Liquid 1 Application(s) Topical <User Schedule>  cholecalciferol 2000 Unit(s) Oral daily  dexAMETHasone  Injectable 6 milliGRAM(s) IV Push daily  dextrose 40% Gel 15 Gram(s) Oral once  dextrose 5%. 1000 milliLiter(s) IV Continuous <Continuous>  dextrose 5%. 1000 milliLiter(s) IV Continuous <Continuous>  dextrose 50% Injectable 25 Gram(s) IV Push once  dextrose 50% Injectable 12.5 Gram(s) IV Push once  escitalopram 15 milliGRAM(s) Oral daily  finasteride 5 milliGRAM(s) Oral daily  glucagon  Injectable 1 milliGRAM(s) IntraMuscular once  guaifenesin/dextromethorphan Oral Liquid 10 milliLiter(s) Oral every 4 hours PRN  heparin   Injectable 8000 Unit(s) IV Push every 6 hours PRN  heparin   Injectable 4000 Unit(s) IV Push every 6 hours PRN  heparin  Infusion.  Unit(s)/Hr IV Continuous <Continuous>  insulin glargine Injectable (LANTUS) 8 Unit(s) SubCutaneous at bedtime  insulin lispro (ADMELOG) corrective regimen sliding scale   SubCutaneous three times a day before meals  insulin lispro (ADMELOG) corrective regimen sliding scale   SubCutaneous at bedtime  latanoprost 0.005% Ophthalmic Solution 1 Drop(s) Both EYES at bedtime  multivitamin 1 Tablet(s) Oral daily  mupirocin 2% Ointment 1 Application(s) Both Nostrils two times a day  simvastatin 20 milliGRAM(s) Oral at bedtime  sodium bicarbonate  Infusion 0.056 mEq/kG/Hr IV Continuous <Continuous>  tamsulosin 0.4 milliGRAM(s) Oral at bedtime  thiamine 100 milliGRAM(s) Oral daily  vancomycin  IVPB 1000 milliGRAM(s) IV Intermittent every 24 hours    Antimicrobials:  cefepime   IVPB 1000 milliGRAM(s) IV Intermittent every 24 hours  vancomycin  IVPB 1000 milliGRAM(s) IV Intermittent every 24 hours

## 2021-12-31 NOTE — PHARMACOTHERAPY INTERVENTION NOTE - COMMENTS
89 YO M, currently on Lantus 4 units at bedtime, for DM (started on 12/29). Noted, dexamethasone was started on 12/24 for 10 days until 1/2.     Home Therapy: Tradjenta 5mg daily    HgbA1C: 6.3 (12/24)    Fingersticks:  12/29: 234/ 302/ 246/ 238/ 254/ 203  12/30: 228/ 221     Diet: Regular, pureed    A/P:  > Lantus 4 units HS started 12/29, however FS today are still elevated >200's. Pt used ~4 units of correctional insulin today so far so recommended to increase Lantus to 8 units SQ at bedtime for tighter glycemic control. Monitor FS and titrate insulin as needed. Dexamethasone will be continued until 1/2 so please note that insulin requirement may need to be reduced or discontinued thereafter.     Benita Huber, PharmD, [facility]  Clinical Pharmacy Specialist  [phone] or Teams
BRIA COREY, 89y Male presented with COVID, now blood cultures positive for MRSA, being followed by ID. Was on zosyn and now on vancomycin 1 gram IV Q24H, serum creatinine continues to rise.    Recommendation(s):  1) Consider D/C zosyn, if treating for aspiration pneumonia would suggest cefepime 1 gram IV Q24H (since no evidence of lung abscess or empyema, cefepime has adequate coverage for oral anaerobes present with aspiration pneumonia)  2) F/u ID for duration of cefepime to complete treatment for asp PNA    With kind regards,  Joaquin Diaz, AileenD  Infectious Diseases Clinical Pharmacist  .
CC: 89 YO M, currently on moderate dose SSI coverage, for DM. Noted, dexamethasone was started on 12/24 for 10 days until 1/2.     Home Therapy: Tradjenta 5mg daily    HgbA1C: 6.3 (12/24)    Fingersticks:  12/28: 202/ 242/ 216/ 204 (6 units of correctional used)  12/29: 234/ 302/ 246    Diet: Regular, pureed    A/P:  > FS have been above goal, consistently >200's. Recommend initiating Lantus 4 units SQ QHS for wider coverage throughout the day. Monitor FS and titrate insulin as needed. Dexamethasone will be continued until 1/2 so please note that insulin requirement may need to be reduced or discontinued thereafter.     Benita Huber, PharmD, [facility]  Clinical Pharmacy Specialist  [phone] or Teams
Patient is a 89 year old man receiving vancomycin for a high-grade MRSA bacteremia. Patient administration for Vancomycin 1000mg Q24H was as follows:    12/28 --> 22:42  12/29 --> 21:50  12/30 --> 21:44    Per the covering nurse, a trough was taken today at 0900 with a reading of 24.9. Calculated AUC/GERALD of this regimen is 461 associated with a trough of 14.7. Recommend getting another trough level prior to the fourth dose tonight due to patient's rising serum creatinine  (3.05 to 4.38). Spoke to covering nurse and she agreed with assessment.    Best.  Abner Whitaker, PharmD  PGY-1 Pharmacy Resident   Spectralink: [id number]

## 2021-12-31 NOTE — PROGRESS NOTE ADULT - PROBLEM SELECTOR PLAN 4
- his Cr is somewhat at his baseline.   - avoid nephrotoxins. will continue to monitor  - bicarb gtt 12/30/21 -->    Arm Edema, so started gentle Lasix.   - mild Cr elevation on Low dose Lasix 20 mg qdaily (though arm edema better). will hold  - he received 2 doses.   - TTE without significant LV dysfunction

## 2021-12-31 NOTE — PROGRESS NOTE ADULT - SUBJECTIVE AND OBJECTIVE BOX
[facility] [place] [facility]   MD PABLO SEGOVIA MD RUORU WONG, PA    TEL:  OFFICE: [phone]  DR PEARCE CELL: [phone]  GAETANO RIBEIRO CELL: [phone]  DR. HOPPER CELL: [phone]      FROM 5 PM - 7 AM PLEASE CALL ANSWERING SERVICE: [phone]    RENAL FOLLOW UP NOTE--Date of Service 12-31-21 @ 10:52  --------------------------------------------------------------------------------  HPI:      Pt covid 19+    PAST HISTORY  --------------------------------------------------------------------------------  No significant changes to PMH, PSH, FHx, SHx, unless otherwise noted    ALLERGIES & MEDICATIONS  --------------------------------------------------------------------------------  Allergies    Cipro (Hives)  fish (Unknown)    Intolerances    Pollen, hayfever (Unknown)    Standing Inpatient Medications  aMIOdarone    Tablet 200 milliGRAM(s) Oral daily  apixaban 2.5 milliGRAM(s) Oral two times a day  ascorbic acid 500 milliGRAM(s) Oral daily  aspirin enteric coated 81 milliGRAM(s) Oral daily  cefepime   IVPB 1000 milliGRAM(s) IV Intermittent every 24 hours  chlorhexidine 4% Liquid 1 Application(s) Topical <User Schedule>  cholecalciferol 2000 Unit(s) Oral daily  dexAMETHasone  Injectable 6 milliGRAM(s) IV Push daily  dextrose 40% Gel 15 Gram(s) Oral once  dextrose 5%. 1000 milliLiter(s) IV Continuous <Continuous>  dextrose 5%. 1000 milliLiter(s) IV Continuous <Continuous>  dextrose 50% Injectable 25 Gram(s) IV Push once  dextrose 50% Injectable 12.5 Gram(s) IV Push once  escitalopram 15 milliGRAM(s) Oral daily  finasteride 5 milliGRAM(s) Oral daily  glucagon  Injectable 1 milliGRAM(s) IntraMuscular once  insulin glargine Injectable (LANTUS) 8 Unit(s) SubCutaneous at bedtime  insulin lispro (ADMELOG) corrective regimen sliding scale   SubCutaneous three times a day before meals  insulin lispro (ADMELOG) corrective regimen sliding scale   SubCutaneous at bedtime  latanoprost 0.005% Ophthalmic Solution 1 Drop(s) Both EYES at bedtime  multivitamin 1 Tablet(s) Oral daily  mupirocin 2% Ointment 1 Application(s) Both Nostrils two times a day  remdesivir  IVPB   IV Intermittent   remdesivir  IVPB 100 milliGRAM(s) IV Intermittent every 24 hours  simvastatin 20 milliGRAM(s) Oral at bedtime  sodium bicarbonate  Infusion 0.056 mEq/kG/Hr IV Continuous <Continuous>  tamsulosin 0.4 milliGRAM(s) Oral at bedtime  thiamine 100 milliGRAM(s) Oral daily  vancomycin  IVPB 1000 milliGRAM(s) IV Intermittent every 24 hours    PRN Inpatient Medications  acetaminophen     Tablet .. 650 milliGRAM(s) Oral every 4 hours PRN  ALBUTerol    90 MICROgram(s) HFA Inhaler 2 Puff(s) Inhalation every 6 hours PRN  guaifenesin/dextromethorphan Oral Liquid 10 milliLiter(s) Oral every 4 hours PRN      REVIEW OF SYSTEMS  --------------------------------------------------------------------------------  General: no fever  MSK: no edema     VITALS/PHYSICAL EXAM  --------------------------------------------------------------------------------  T(C): 36.3 (12-31-21 @ 04:54), Max: 36.4 (12-31-21 @ 01:05)  HR: 83 (12-31-21 @ 04:54) (80 - 90)  BP: 111/71 (12-31-21 @ 04:54) (97/65 - 112/71)  RR: 22 (12-31-21 @ 05:30) (20 - 28)  SpO2: 98% (12-31-21 @ 05:30) (86% - 98%)  Wt(kg): --        12-30-21 @ 07:01  -  12-31-21 @ 07:00  --------------------------------------------------------  IN: 0 mL / OUT: 0 mL / NET: 0 mL      Physical Exam   NAD on High flow  Course breath sounds   Soft abdomen   no Edema  leti  LABS/STUDIES  --------------------------------------------------------------------------------              13.6   29.17 >-----------<  290      [12-30-21 @ 07:18]              39.3     144  |  107  |  100  ----------------------------<  195      [12-30-21 @ 07:18]  3.9   |  15  |  3.05        Ca     8.2     [12-30-21 @ 07:18]    TPro  5.7  /  Alb  1.9  /  TBili  0.9  /  DBili  0.6  /  AST  133  /  ALT  110  /  AlkPhos  182  [12-31-21 @ 10:17]          Creatinine Trend:  SCr 3.05 [12-30 @ 07:18]  SCr 2.39 [12-29 @ 12:23]  SCr 2.13 [12-29 @ 09:50]  SCr 2.05 [12-28 @ 06:12]  SCr 1.95 [12-27 @ 21:44]    Urinalysis - [12-26-21 @ 06:04]      Color Yellow / Appearance Slightly Turbid / SG 1.025 / pH 6.0      Gluc Negative / Ketone Negative  / Bili Negative / Urobili Negative       Blood Moderate / Protein 30 mg/dL / Leuk Est Negative / Nitrite Negative      RBC 13 / WBC 12 / Hyaline 4 / Gran  / Sq Epi  / Non Sq Epi 8 / Bacteria Few      Ferritin 990      [12-28-21 @ 09:02]    HBsAg Nonreact      [12-25-21 @ 09:17]  HCV 0.86, Nonreact      [12-25-21 @ 09:17]

## 2021-12-31 NOTE — PROGRESS NOTE ADULT - PROBLEM SELECTOR PLAN 6
- continue amiodarone   - continue Eliquis - continue amiodarone   - continue heparin gtt in place of eliquis

## 2021-12-31 NOTE — PROGRESS NOTE ADULT - ASSESSMENT
Acute hypoxemic respiratory failue due to multi-lobar COVID 19 viral pneumonia with probable superimposed aspiration, MRSA positive  CKD  Occiptial CVA - chronically bedbound    REC    Continue Vancomycin and Cefepime  LE dopplers pending  Consider conversion to full dose AC with IV heparin   Titrate HiFlo FIO2 to target sat 88-90%  Continue Decadron IV  Aspiration precuations  Repeat D-dimer 1/1

## 2021-12-31 NOTE — PROGRESS NOTE ADULT - ASSESSMENT
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   Per daughter ,patients home attendant noted that patient was breathing heavy on day of admission. Per family , patient has been having chest congestion for the past month , intermittent coughing with white sputum, occasional dysphagia  patient is bedbound, and has been sleeping most of the day for the past few months, with minimal interaction, although at times does perk up and may communicate    Leukocytosis  leukocytosis worsening  may be 2/2 currently COVID infection and MRSA bacteremia vs new infection  consider CT C/A/P to r/o other infectious etiology  only 1 stool recorded over the past 24 hours, therefore C Diff unlikely  liver enzymes elevated but bili wnl, can still consider hepatic US    MRSA bacteremia  blood cx 12/27 positive for MRSA in 4/4 bottles  unclear source at this time, possibly skin wound vs PNA  TTE w/o mention of vegetations  c/w vancomycin 1g every 24 hours, goal trough 15-20  obtain trough prior to dose on 12/30 (evening), if >trough >20 hold evening dose and check random level w/ AM labs; if trough <15 increase to 1.25g every 24 hours  f/u repeat blood cultures 12/30   poor prognosis    Covid PNA, acute hypoxic resp failure  - Pt with low grade temp on admission 100F, WBC 11.  Initially requiring 4L NC.  CTA chest no PE, (+) peripheral GGOs and b/l endobronchial secretions.  s/p landeros placement for urinary retention  - Pt on home AC, apixiban.  No PE on CTA chest.  c/w remdesivir x 5 day course, last day today  c/w dexamethasone 6mg IV qdaily x 10 days  s/p tocilizumab 12/27  CXR w/ possible LLL infiltrate  c/w cefepime to complete 5 day course for possible superimposed aspiration PNA  Wean O2 as tolerated  monitor inflammatory markers    Henrry Mejia M.D.  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   Per daughter ,patients home attendant noted that patient was breathing heavy on day of admission. Per family , patient has been having chest congestion for the past month , intermittent coughing with white sputum, occasional dysphagia  patient is bedbound, and has been sleeping most of the day for the past few months, with minimal interaction, although at times does perk up and may communicate    Leukocytosis  leukocytosis worsening  may be 2/2 currently COVID infection and MRSA bacteremia vs new infection  consider CT C/A/P to r/o other infectious etiology  only 1 stool recorded over the past 24 hours, therefore C Diff unlikely  liver enzymes elevated but bili wnl, can still consider hepatic US    MRSA bacteremia  blood cx 12/27 positive for MRSA in 4/4 bottles  unclear source at this time, possibly skin wound vs PNA  TTE w/o mention of vegetations  c/w vancomycin 1g every 24 hours, goal trough 15-20, vanc trough 18.2 yest (12/31 AM level not true trough)  f/u repeat blood cultures 12/30   poor prognosis    Covid PNA, acute hypoxic resp failure  - Pt with low grade temp on admission 100F, WBC 11.  Initially requiring 4L NC.  CTA chest no PE, (+) peripheral GGOs and b/l endobronchial secretions.  s/p landeros placement for urinary retention  - Pt on home AC, apixiban.  No PE on CTA chest.  c/w remdesivir x 5 day course, last day today  c/w dexamethasone 6mg IV qdaily x 10 days  s/p tocilizumab 12/27  CXR w/ possible LLL infiltrate  c/w cefepime to complete 5 day course for possible superimposed aspiration PNA  Wean O2 as tolerated  monitor inflammatory markers    Henrry Mejia M.D.  [facility], Division of Infectious Diseases  [phone]  After 5pm on weekdays and all day on weekends - please call [phone]

## 2021-12-31 NOTE — PROGRESS NOTE ADULT - ASSESSMENT
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

## 2021-12-31 NOTE — PROGRESS NOTE ADULT - SUBJECTIVE AND OBJECTIVE BOX
Follow-up Pulm Progress Note  Connor Joe MD  [phone]    Afebrile and hemodynamically stable: FIO2 on HiFlo remains  100%  Remains obtunded  Creatinine increased to 4.38  D-dimer continues uptrendin today  LE dopplers pending  MRSA positive PCR: Vanco added to cefepime  F/U CXR : increasing opacities bilateral      Vital Signs Last 24 Hrs  T(C): 36.4 (31 Dec 2021 10:57), Max: 36.4 (31 Dec 2021 01:05)  T(F): 97.5 (31 Dec 2021 10:57), Max: 97.5 (31 Dec 2021 01:05)  HR: 79 (31 Dec 2021 10:57) (79 - 90)  BP: 97/58 (31 Dec 2021 10:57) (97/58 - 112/71)  BP(mean): --  RR: 21 (31 Dec 2021 10:57) (20 - 28)  SpO2: 99% (31 Dec 2021 10:57) (86% - 99%)                        13.0   35.26 )-----------( 287      ( 31 Dec 2021 13:01 )             37.3           147<H>  |  110<H>  |  x   ----------------------------<  220<H>  4.7   |  15<L>  |  4.38<H>    Ca    7.8<L>      31 Dec 2021 13:01    TPro  5.4<L>  /  Alb  2.0<L>  /  TBili  0.9  /  DBili  x   /  AST  140<H>  /  ALT  117<H>  /  AlkPhos  186<H>      CULTURES:  Culture Results:   Growth in aerobic bottle: Gram Positive Cocci in Clusters  Growth in anaerobic bottle: Gram Positive Cocci in Clusters ( @ 21:59)  Culture Results:   Growth in anaerobic bottle: Staphylococcus aureus  Growth in aerobic bottle: Gram Positive Cocci in Clusters  ***Blood Panel PCR results on this specimen are available  approximately 3 hours after the Gram stain result.***  Gram stain, PCR, and/or culture results may not always  correspond due to difference in methodologies.  ************************************************************  This PCR assay was performed by multiplex PCR. This  Assay tests for 66 bacterial and resistance gene targets.  Please refer to the [facility] Labs test directory  at [email or web address] for details. ( @ 21:59)  Culture Results:   <10,000 CFU/mL Normal Urogenital Amber ( @ 09:30)  Culture Results:   No Growth Final ( @ 18:53)  Culture Results:   No Growth Final ( @ 18:53)    Most recent blood culture -- :59   Blood Culture PCR Blood Culture PCR .Blood Blood-Peripheral  @ 21:59  Most recent blood culture -- :30   -- -- Clean Catch Clean Catch (Midstream) :30      Physical Examination:  PULM: No wheeze or rhonchi  CVS: Regular rate and rhythm, no murmurs, rubs, or gallops  ABD: Soft, non-tender  EXT:  No clubbing, cyanosis, or edema    RADIOLOGY REVIEWED  CXR:    CT chest:    PROCEDURE DATE:  2021      INTERPRETATION:  Reason for Exam: Shortness of breath. chest pain.    CTA of the chest was performed from the thoracic inlet to the level of   the adrenal glands following IV contrast injection of  80 cc of Omnipaque   350. No immediate complications were reported.  MIP images were also   created and reviewed.    Comparison: Chest xray of same date.    Tubes/Lines: None    Mediastinum and Heart: Aorta and pulmonary arteries are normal in size. A   left lower pole thyroid nodule is seen measures 6 mm. No lymphadenopathy.   No pericardial effusion.    Lungs, Pleura, and Airways: There is no pulmonary embolus. Peripheral   ground glass opacities. Bilateral endobronchial secretions noted.    Visualized Abdomen: Unremarkable.    Bones and soft tissues: Unremarkable.    IMPRESSION:    No pulmonary embolus.    Peripheral ground glass opacities noted bilaterally, which may represent   COVID in the right clinical setting.    Bilateral endobronchial secretions noted.    TTE:

## 2022-01-01 VITALS — HEART RATE: 95 BPM | OXYGEN SATURATION: 92 % | RESPIRATION RATE: 20 BRPM

## 2022-01-01 LAB
ALBUMIN SERPL ELPH-MCNC: <0.4 G/DL — LOW (ref 3.3–5)
ALP SERPL-CCNC: 27 U/L — LOW (ref 40–120)
ALT FLD-CCNC: 22 U/L — SIGNIFICANT CHANGE UP (ref 10–45)
ANION GAP SERPL CALC-SCNC: 16 MMOL/L — SIGNIFICANT CHANGE UP (ref 5–17)
APTT BLD: >200 SEC — CRITICAL HIGH (ref 27.5–35.5)
AST SERPL-CCNC: 39 U/L — SIGNIFICANT CHANGE UP (ref 10–40)
BILIRUB SERPL-MCNC: 0.3 MG/DL — SIGNIFICANT CHANGE UP (ref 0.2–1.2)
BUN SERPL-MCNC: 144 MG/DL — HIGH (ref 7–23)
CALCIUM SERPL-MCNC: 6 MG/DL — CRITICAL LOW (ref 8.4–10.5)
CHLORIDE SERPL-SCNC: 116 MMOL/L — HIGH (ref 96–108)
CO2 SERPL-SCNC: 15 MMOL/L — LOW (ref 22–31)
CREAT SERPL-MCNC: 5.17 MG/DL — HIGH (ref 0.5–1.3)
CULTURE RESULTS: SIGNIFICANT CHANGE UP
CULTURE RESULTS: SIGNIFICANT CHANGE UP
GLUCOSE SERPL-MCNC: 209 MG/DL — HIGH (ref 70–99)
GRAM STN FLD: SIGNIFICANT CHANGE UP
MAGNESIUM SERPL-MCNC: 2.2 MG/DL — SIGNIFICANT CHANGE UP (ref 1.6–2.6)
PHOSPHATE SERPL-MCNC: 6.7 MG/DL — HIGH (ref 2.5–4.5)
POTASSIUM SERPL-MCNC: 6.1 MMOL/L — HIGH (ref 3.5–5.3)
POTASSIUM SERPL-SCNC: 6.1 MMOL/L — HIGH (ref 3.5–5.3)
PROT SERPL-MCNC: 1.1 G/DL — LOW (ref 6–8.3)
SODIUM SERPL-SCNC: 147 MMOL/L — HIGH (ref 135–145)
SPECIMEN SOURCE: SIGNIFICANT CHANGE UP
SPECIMEN SOURCE: SIGNIFICANT CHANGE UP

## 2022-01-01 PROCEDURE — 86140 C-REACTIVE PROTEIN: CPT

## 2022-01-01 PROCEDURE — 85610 PROTHROMBIN TIME: CPT

## 2022-01-01 PROCEDURE — 82435 ASSAY OF BLOOD CHLORIDE: CPT

## 2022-01-01 PROCEDURE — 82553 CREATINE MB FRACTION: CPT

## 2022-01-01 PROCEDURE — 85730 THROMBOPLASTIN TIME PARTIAL: CPT

## 2022-01-01 PROCEDURE — 71275 CT ANGIOGRAPHY CHEST: CPT | Mod: MA

## 2022-01-01 PROCEDURE — 99285 EMERGENCY DEPT VISIT HI MDM: CPT | Mod: 25

## 2022-01-01 PROCEDURE — 87086 URINE CULTURE/COLONY COUNT: CPT

## 2022-01-01 PROCEDURE — 96375 TX/PRO/DX INJ NEW DRUG ADDON: CPT

## 2022-01-01 PROCEDURE — 36600 WITHDRAWAL OF ARTERIAL BLOOD: CPT

## 2022-01-01 PROCEDURE — 93306 TTE W/DOPPLER COMPLETE: CPT

## 2022-01-01 PROCEDURE — 80076 HEPATIC FUNCTION PANEL: CPT

## 2022-01-01 PROCEDURE — 87186 SC STD MICRODIL/AGAR DIL: CPT

## 2022-01-01 PROCEDURE — 96365 THER/PROPH/DIAG IV INF INIT: CPT

## 2022-01-01 PROCEDURE — 87640 STAPH A DNA AMP PROBE: CPT

## 2022-01-01 PROCEDURE — 93005 ELECTROCARDIOGRAM TRACING: CPT

## 2022-01-01 PROCEDURE — 83735 ASSAY OF MAGNESIUM: CPT

## 2022-01-01 PROCEDURE — 80053 COMPREHEN METABOLIC PANEL: CPT

## 2022-01-01 PROCEDURE — 82550 ASSAY OF CK (CPK): CPT

## 2022-01-01 PROCEDURE — 84295 ASSAY OF SERUM SODIUM: CPT

## 2022-01-01 PROCEDURE — 80202 ASSAY OF VANCOMYCIN: CPT

## 2022-01-01 PROCEDURE — 71045 X-RAY EXAM CHEST 1 VIEW: CPT

## 2022-01-01 PROCEDURE — 83880 ASSAY OF NATRIURETIC PEPTIDE: CPT

## 2022-01-01 PROCEDURE — 76705 ECHO EXAM OF ABDOMEN: CPT

## 2022-01-01 PROCEDURE — 82947 ASSAY GLUCOSE BLOOD QUANT: CPT

## 2022-01-01 PROCEDURE — 71045 X-RAY EXAM CHEST 1 VIEW: CPT | Mod: 26

## 2022-01-01 PROCEDURE — 81001 URINALYSIS AUTO W/SCOPE: CPT

## 2022-01-01 PROCEDURE — 0225U NFCT DS DNA&RNA 21 SARSCOV2: CPT

## 2022-01-01 PROCEDURE — 94640 AIRWAY INHALATION TREATMENT: CPT

## 2022-01-01 PROCEDURE — 93308 TTE F-UP OR LMTD: CPT

## 2022-01-01 PROCEDURE — 83036 HEMOGLOBIN GLYCOSYLATED A1C: CPT

## 2022-01-01 PROCEDURE — 97161 PT EVAL LOW COMPLEX 20 MIN: CPT

## 2022-01-01 PROCEDURE — 85025 COMPLETE CBC W/AUTO DIFF WBC: CPT

## 2022-01-01 PROCEDURE — 85018 HEMOGLOBIN: CPT

## 2022-01-01 PROCEDURE — 36415 COLL VENOUS BLD VENIPUNCTURE: CPT

## 2022-01-01 PROCEDURE — 82330 ASSAY OF CALCIUM: CPT

## 2022-01-01 PROCEDURE — 82962 GLUCOSE BLOOD TEST: CPT

## 2022-01-01 PROCEDURE — 80048 BASIC METABOLIC PNL TOTAL CA: CPT

## 2022-01-01 PROCEDURE — 87641 MR-STAPH DNA AMP PROBE: CPT

## 2022-01-01 PROCEDURE — 85027 COMPLETE CBC AUTOMATED: CPT

## 2022-01-01 PROCEDURE — 82803 BLOOD GASES ANY COMBINATION: CPT

## 2022-01-01 PROCEDURE — 87040 BLOOD CULTURE FOR BACTERIA: CPT

## 2022-01-01 PROCEDURE — 84484 ASSAY OF TROPONIN QUANT: CPT

## 2022-01-01 PROCEDURE — 85379 FIBRIN DEGRADATION QUANT: CPT

## 2022-01-01 PROCEDURE — 84145 PROCALCITONIN (PCT): CPT

## 2022-01-01 PROCEDURE — 83605 ASSAY OF LACTIC ACID: CPT

## 2022-01-01 PROCEDURE — 70450 CT HEAD/BRAIN W/O DYE: CPT | Mod: MA

## 2022-01-01 PROCEDURE — 85014 HEMATOCRIT: CPT

## 2022-01-01 PROCEDURE — 87150 DNA/RNA AMPLIFIED PROBE: CPT

## 2022-01-01 PROCEDURE — 84100 ASSAY OF PHOSPHORUS: CPT

## 2022-01-01 PROCEDURE — 82728 ASSAY OF FERRITIN: CPT

## 2022-01-01 PROCEDURE — 84132 ASSAY OF SERUM POTASSIUM: CPT

## 2022-01-01 PROCEDURE — 80074 ACUTE HEPATITIS PANEL: CPT

## 2022-01-01 PROCEDURE — 87077 CULTURE AEROBIC IDENTIFY: CPT

## 2022-01-01 RX ORDER — INSULIN HUMAN 100 [IU]/ML
10 INJECTION, SOLUTION SUBCUTANEOUS ONCE
Refills: 0 | Status: COMPLETED | OUTPATIENT
Start: 2022-01-01 | End: 2022-01-01

## 2022-01-01 RX ORDER — CALCIUM GLUCONATE 100 MG/ML
1 VIAL (ML) INTRAVENOUS ONCE
Refills: 0 | Status: DISCONTINUED | OUTPATIENT
Start: 2022-01-01 | End: 2022-01-01

## 2022-01-01 RX ORDER — DEXTROSE 50 % IN WATER 50 %
50 SYRINGE (ML) INTRAVENOUS ONCE
Refills: 0 | Status: COMPLETED | OUTPATIENT
Start: 2022-01-01 | End: 2022-01-01

## 2022-01-01 RX ADMIN — CHLORHEXIDINE GLUCONATE 1 APPLICATION(S): 213 SOLUTION TOPICAL at 05:29

## 2022-01-01 RX ADMIN — MUPIROCIN 1 APPLICATION(S): 20 OINTMENT TOPICAL at 05:29

## 2022-01-01 RX ADMIN — Medication 50 MILLILITER(S): at 17:00

## 2022-01-01 RX ADMIN — Medication 6 MILLIGRAM(S): at 05:23

## 2022-01-01 RX ADMIN — HEPARIN SODIUM 0 UNIT(S)/HR: 5000 INJECTION INTRAVENOUS; SUBCUTANEOUS at 01:10

## 2022-01-01 RX ADMIN — HEPARIN SODIUM 1500 UNIT(S)/HR: 5000 INJECTION INTRAVENOUS; SUBCUTANEOUS at 02:49

## 2022-01-01 NOTE — PROGRESS NOTE ADULT - REASON FOR ADMISSION
respiratory distress x 1 day

## 2022-01-01 NOTE — PROGRESS NOTE ADULT - PROBLEM SELECTOR PROBLEM 4
ARELI (acute kidney injury)

## 2022-01-01 NOTE — PROVIDER CONTACT NOTE (CRITICAL VALUE NOTIFICATION) - RECOMMENDATIONS
continue monitoring; continue antibiotics
notify NP and RN continue to monitor
none
continue monitoring; notify of any changes-follow heparin nomogram
continue monitoring; ; hold vanco dose 1G for 2200;

## 2022-01-01 NOTE — PROGRESS NOTE ADULT - PROVIDER SPECIALTY LIST ADULT
Infectious Disease
Internal Medicine
Nephrology
Nephrology
Pulmonology
Infectious Disease
Internal Medicine
Nephrology
Nephrology
Pulmonology
Pulmonology
Infectious Disease
Infectious Disease
Internal Medicine

## 2022-01-01 NOTE — PROVIDER CONTACT NOTE (CRITICAL VALUE NOTIFICATION) - ASSESSMENT
pt VSS; afebrile; sleeping comfortably
calcium 6.0
pt VSS; no s/s of bleeding, bruising or hematoma; sleeping comfortably
pt asymptomatic; sleeping comfortably;

## 2022-01-01 NOTE — PROGRESS NOTE ADULT - PROBLEM SELECTOR PROBLEM 5
Urinary retention

## 2022-01-01 NOTE — PROGRESS NOTE ADULT - PROBLEM SELECTOR PLAN 5
s/p indwelling landeros, family concerned of bleeding, there is blood in Landeros, but appears to be draining   - danielle color urine. no bleed.   - monitor ins and outs   -  consult if needed.    - continue finasteride   - continue tamsulosin
s/p indwelling landeros, family concerned of bleeding, there is blood in Landeros, but appears to be draining   - danielle color urine. no bleed.   - monitor ins and outs   -  consult if needed.    - continue finasteride   - continue tamsulosin
s/p indwelling landeros, family concerned of bleeding, there is blood in landeros, but appears to be draining   - monitor ins and outs   -  consult if needed.    - continue finasteride   - continue tamsulosin
s/p indwelling landeros, family concerned of bleeding, there is blood in Landeros, but appears to be draining   - danielle color urine. no bleed.   - monitor ins and outs   -  consult if needed.    - continue finasteride   - continue tamsulosin

## 2022-01-01 NOTE — PROVIDER CONTACT NOTE (OTHER) - RECOMMENDATIONS
Monitor, place on BiPap if sats continue to decrease
Notify Provider, Continue monitoring, Bipap on standby
Notify provider
hold vancomycin 1G IV for 2200; recheck trough in AM;
Notify provider
Notify provider.
continue monitoring; continue Antibiotic therapy;

## 2022-01-01 NOTE — PROGRESS NOTE ADULT - PROBLEM/PLAN-8
LATERAL MENISCUS TEAR  [id number]  MEDIAL MENISCUS TEAR  [id number]  ACL  W78.349H  [id number]
DISPLAY PLAN FREE TEXT

## 2022-01-01 NOTE — PROGRESS NOTE ADULT - ASSESSMENT
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

## 2022-01-01 NOTE — PROVIDER CONTACT NOTE (CRITICAL VALUE NOTIFICATION) - BACKGROUND
pt admitted with hypoxic resp failure
pt admitted with hypoxic resp failure
pt admitted with acute resp failure of hypoxia

## 2022-01-01 NOTE — PROVIDER CONTACT NOTE (CRITICAL VALUE NOTIFICATION) - SITUATION
vanco trough:27.6
aPTT: >200
calcium 6.0
Blood culture 12/27 growth in anaerobic bottle gram + cocci in cluster
anaerobic gram + cocci in clusters

## 2022-01-01 NOTE — PROVIDER CONTACT NOTE (OTHER) - BACKGROUND
Pt admitted acute respiratory failure. PMH: COVID19, CVA, UTI. DMII.
pt admitted with acute resp failure with hypoxia
Pt admitted for acute respiratory failure w/ hypoxia. PMH: CVA, recurrent UTIs, DM.
Pt admitted for acute respiratory failure.
pt admitted with acute resp  failure with hypoxia
Patient on HFNC 60L @ 100% and was tolerating well between 88-90% for the last two days.
admitted 12/23 w/ dx acute resp failure w/ hypoxia. Pt w/ positive blood cultures on Zosyn and started Vanco.

## 2022-01-01 NOTE — PROGRESS NOTE ADULT - PROBLEM SELECTOR PROBLEM 6
Ventricular arrhythmia

## 2022-01-01 NOTE — PROGRESS NOTE ADULT - SUBJECTIVE AND OBJECTIVE BOX
I spoke with HCP  I explained that pt is still requiring high-levels of O2 through [facility]  I further explained that his overall clinical conditionally has significantly deteriorated in the past 3 days 2' ARELI that has not responded to IVF resuscitation  I explained that pt is not a candidate for HD given his low BP, tenuous O2 status and not least due to the fact that his short-term and long-term prognosis remain poor; he is very likely past the point of any return to recovery from his ARELI and COVID-19 PNA  I recommended palliative care consultation and the usage of low-dose IVP opioids to keep pt comfortable during the weekend  HCP verbalized understanding and agreed for low-dose IVP opioids if needed this weekend and to pallative care consult.    Vital Signs Last 24 Hrs  T(C): 36.9 (01 Jan 2022 11:15), Max: 36.9 (01 Jan 2022 11:15)  T(F): 98.5 (01 Jan 2022 11:15), Max: 98.5 (01 Jan 2022 11:15)  HR: 77 (01 Jan 2022 11:35) (71 - 80)  BP: 98/60 (01 Jan 2022 11:15) (91/53 - 98/60)  BP(mean): --  RR: 20 (01 Jan 2022 11:35) (18 - 20)  SpO2: 92% (01 Jan 2022 11:35) (92% - 96%)    I&O's Summary    12-31-21 @ 07:01 - 01-01-22 @ 07:00  --------------------------------------------------------  IN: 0 mL / OUT: 50 mL / NET: -50 mL    01-01-22 @ 07:01  -  01-01-22 @ 11:52  --------------------------------------------------------  IN: 0 mL / OUT: 0 mL / NET: 0 mL        PHYSICAL EXAM:  GENERAL: NAD, on hi-flow  HEAD:  Atraumatic, Normocephalic  EYES: EOMI, PERRLA, conjunctiva and sclera clear  NECK: Supple, No JVD  CHEST/LUNG: mild decrease breath sounds bilaterally; No wheeze   HEART: Regular rate and rhythm; No murmurs, rubs, or gallops  ABDOMEN: Soft, Nontender, Nondistended; Bowel sounds present  : Cortes in place, danielle color urine, neg CVAT   Neuro: AAOx0, eyes open, awake but no meaningful interaction,   EXTREMITIES:  2+ Peripheral Pulses, No clubbing, cyanosis, +B/L UE edema.  LE b/l less edematous   SKIN: (+)sacral/buttock wounds with granular tissue; no malodorous discharge     LABS:                        13.1   34.32 )-----------( 251      ( 31 Dec 2021 23:10 )             37.0     12-31    147<H>  |  110<H>  |  119<H>  ----------------------------<  220<H>  4.7   |  15<L>  |  4.38<H>    Ca    7.8<L>      31 Dec 2021 13:01    TPro  5.4<L>  /  Alb  2.0<L>  /  TBili  0.9  /  DBili  x   /  AST  140<H>  /  ALT  117<H>  /  AlkPhos  186<H>  12-31    PTT - ( 31 Dec 2021 23:10 )  PTT:>200.0 sec  CAPILLARY BLOOD GLUCOSE      POCT Blood Glucose.: 182 mg/dL (01 Jan 2022 06:19)  POCT Blood Glucose.: 220 mg/dL (01 Jan 2022 00:15)  POCT Blood Glucose.: 196 mg/dL (31 Dec 2021 22:14)  POCT Blood Glucose.: 210 mg/dL (31 Dec 2021 16:36)            RADIOLOGY & ADDITIONAL TESTS:    Imaging Personally Reviewed:  [x] YES  [ ] NO    Will obtain old records:  [ ] YES  [x] NO

## 2022-01-01 NOTE — PROGRESS NOTE ADULT - PROBLEM SELECTOR PLAN 4
- his Cr is somewhat at his baseline.   - avoid nephrotoxins. will continue to monitor  - bicarb gtt 12/30/21 -->  - he is not a candidate for HD; HCP understands    Arm Edema, so started gentle Lasix.   - mild Cr elevation on Low dose Lasix 20 mg qdaily (though arm edema better). will hold  - he received 2 doses.   - TTE without significant LV dysfunction

## 2022-01-01 NOTE — PROGRESS NOTE ADULT - PROBLEM SELECTOR PROBLEM 3
Elevated troponin

## 2022-01-01 NOTE — CHART NOTE - NSCHARTNOTEFT_GEN_A_CORE
Called by RN to report that pt is desaturating on venturi mask (15L/50%). Pt seen at bedside, more lethargic than this afternoon. Opens eyes when you call his name and goes back to sleep. CXR, ABG, HFNC ordered. Discussed with Dr. Sharif and started Zosyn empirically. Will continue to monitor and endorse to night covering ACP.    Elvia Marinelli, SATHYAc
Cardiology consult Dr. Ford called. Spoke with Dr. Cardenas. who recommended to speak with cardiology fellow to discuss the case today.   Spoke with cardiology fellow Dr. Rivas who reviewed the chart and recommended repeat CE and EKG and Echo, and Continue Amiodarone for now.   Trop 60  with normal CKMB, EKG with no changes. Dr. Rivas aware- elevated trop not likley ACS. To follow up with Dr. Ford team tomorrow.     Mira Canales NP-C  #[id number]
Discussed with Dr. Noonan and Humble discontinued and pt started on Heparin gtt (hx afib/stroke). Will continue to monitor.    Moises Baker  [id number]
Discussed with the daughter Aurelia ([facility]) [phone] and another daughter via phone conference.  clinical status updated.   His O2 stable ~ 3L. He lives at home with wife and 24/7 home aide. The daughters live closeby.   Since his prior CVA, he need help with ADLs. his mental status is at baseline. His current mental status is at baseline.   He do not have home O2.   HIs UE are edeamtous and TTE is pending.  Cr is downtrending so will give him a trial of low dose diuretics. monitor urine outpt (he has chronic landeros).  Hematuria has resolved and landeros draining well. UA is negative.     The daughters provided the grandaughters contact: Lilia Vega  (GYN resident).   Will discuss case in next 1-2 days.     Will continue to monitor. He is not ready for dc planning.   will wean NC as he tolerates.    Advance directives discussed.  Goals of care discussed. All questions answered.   Pt has paperwork and both daughtes confirmed DNR/DNI. will fill out MOIST form tomorrow.   Advance care planning time: approximately 30 mins spent discussing goals of care.   All questions answered.     - [facility]IX [facility]if [email or web address]  - [phone]
NP note - called by RN for + blood cultures, pt is currently on zosyn. Will monitor,  JACQUES Muniz NP
RRT called by RN for HR in the 30's. Pt is a DNR/DNI ([place] in chart). Dr. Noonan aware and discussed with pt's daughter and HCP Aurelia at [phone] and pt now comfort care. Family currently FaceTiming patient.     Elvia Marinelli, NP-c  [id number]
Rapidly worsening O2 status.    NC O2 --> Venti --> nonrebreather --> hi-flow.  CXR repeated, no clear suspicion of bacterial infection.  left sided opacity/atelectasis unchanged.     In the setting of hi-flow requirement, will given Toci x 1 dose.   d/w the daughter Aurelia and she agreed.  Side effects of superimposed bacterial infection and immune compromised with Toci explained.  benefits outweights the risk.    d/w the Pharmacist, and Dr. Sevilla for approval.    Toci x 1 ordered.    - Dr. ALIX Sharif ([facility])  - [phone]
Received call from the nurse because of elevated troponin.  Patient followed by Dr. Kit Ford with ventricular arrhythmia treated with amiodarone, creatinine around 1.5 to and mildly elevated AST and ALT.  Last visit with Dr. Ford was a telehealth visit and he has not been seen in the office probably in a year.  Patient bedbound.  Admitted with positive Covid pneumonia.  Reviewed the labs as well as the troponin and CK and it seems all consistent with acute Covid illness without an acute new cardiac problem.  EKG unchanged from a year ago.  Have asked house cardiology to assess patient as to whether there is any need for cardiac work-up at this time.    THEO Cardenas  [phone]
Called to pronounce pt with cardiopulmonary arrest secondary to covid-19. Pt found in bed.   Family notified; Pt's daughter Aurelia ([facility]) [phone].   Heart sounds absent, no spontaneous respirations, no palpable BP or pulse, pupils fixed and dilated.   Pt pronounced on 1/1/22 at 1725.   Family did not want autopsy (Aurelia (HCP) [phone]). Not an ME case.   Attending Dr. Noonan notified.     Elvia Marinelli, NP-c  [id number]

## 2022-01-01 NOTE — PROVIDER CONTACT NOTE (OTHER) - ASSESSMENT
No change in resp assessment noted.
On assessment, patient noted to be using accessory muscles to breathe and RR at 28, SAT 86%
Pt AOx0-non verbal. No indicators of chest pain or sob present. All other VSS.
Pt lethargic w/ poor appetite, VSS, on HFNC 60L 100%, landeros catheter intact & WDL.
pt VSS; asymptomatic; sleeping comfortably
Pt nonverbal, lethargic, SpO2: 88% on 60% 15L ventimask, RR: 20, abdominal breathing noted, BP: 125/70, HR: 70, temp: 96.7 rectal.
pt VSS; afebrile; no s/s of infection; asymptomatic, sleeping comfortably;

## 2022-01-01 NOTE — PROVIDER CONTACT NOTE (CRITICAL VALUE NOTIFICATION) - ACTION/TREATMENT ORDERED:
NP and RN made aware, continue to monitor NP and RN made aware, continue to monitor, calcium gluconate ordered

## 2022-01-01 NOTE — PROVIDER CONTACT NOTE (OTHER) - REASON
anaerobic growth in gram + cocci in clusters
vanco trough: 27.6
O2 Desat
Pt O2 on 6L NC was 87.
Pt output 50 mL in landeros catheter over 8 hours
Increase in Oxygen needs
Pt desat to 88% on 60% 15L ventimask

## 2022-01-01 NOTE — PROVIDER CONTACT NOTE (OTHER) - ACTION/TREATMENT ORDERED:
Continue to monitor patient on HFNC, maxed out settings and have bipap at the bedside.
Provider aware. Increase O2 to Venti mask 60%. Will continue to monitor pt.
Provider notified, bladder scan performed, will continue to monitor.
Place in prone position as pt can tolerate
Provider notified, HFNC ordered, blood cultures ordered, zosyn ordered, ABG ordered, CXR ordered, will continue to monitor.
provider ordered: continue monitoring; continue Antibiotic therapy;
provider ordered: hold vancomycin 1G IV for 2200; recheck trough in AM; continue monitoring

## 2022-01-01 NOTE — PROVIDER CONTACT NOTE (OTHER) - SITUATION
Increase in Oxygen needs noted. Currently on 60L/ 100% HFNC w/ sats 90%
Pt desat to 88% on 60% 15L ventimask
anaerobic growth in gram + cocci in clusters
vanco trough: 27.6
Patient on HFNC 60 L @ 100%. Patient desat to 86% with RR of 28, using accessory muscles.
Pt O2 on 6L NC was 87.
Pt output 50 mL in landeros catheter over 8 hours

## 2022-01-01 NOTE — PROGRESS NOTE ADULT - SUBJECTIVE AND OBJECTIVE BOX
[facility]   MD PABLO SEGOVIA MD RUORU WONG, PA    TEL:  OFFICE: [phone]  DR PEARCE CELL: [phone]  GAETANO RIBEIRO CELL: [phone]  DR. HOPPER CELL: [phone]      FROM 5 PM - 7 AM PLEASE CALL ANSWERING SERVICE: [phone]    RENAL FOLLOW UP NOTE--Date of Service 01-01-22 @ 10:31  --------------------------------------------------------------------------------  HPI:      Pt seen and examined at bedside.       PAST HISTORY  --------------------------------------------------------------------------------  No significant changes to PMH, PSH, FHx, SHx, unless otherwise noted    ALLERGIES & MEDICATIONS  --------------------------------------------------------------------------------  Allergies    Cipro (Hives)  fish (Unknown)    Intolerances    Pollen, hayfever (Unknown)    Standing Inpatient Medications  aMIOdarone    Tablet 200 milliGRAM(s) Oral daily  ascorbic acid 500 milliGRAM(s) Oral daily  aspirin enteric coated 81 milliGRAM(s) Oral daily  cefepime   IVPB 1000 milliGRAM(s) IV Intermittent every 24 hours  chlorhexidine 4% Liquid 1 Application(s) Topical <User Schedule>  cholecalciferol 2000 Unit(s) Oral daily  dextrose 40% Gel 15 Gram(s) Oral once  dextrose 5%. 1000 milliLiter(s) IV Continuous <Continuous>  dextrose 5%. 1000 milliLiter(s) IV Continuous <Continuous>  dextrose 50% Injectable 25 Gram(s) IV Push once  dextrose 50% Injectable 12.5 Gram(s) IV Push once  escitalopram 15 milliGRAM(s) Oral daily  finasteride 5 milliGRAM(s) Oral daily  glucagon  Injectable 1 milliGRAM(s) IntraMuscular once  heparin  Infusion.  Unit(s)/Hr IV Continuous <Continuous>  insulin glargine Injectable (LANTUS) 8 Unit(s) SubCutaneous at bedtime  insulin lispro (ADMELOG) corrective regimen sliding scale   SubCutaneous three times a day before meals  insulin lispro (ADMELOG) corrective regimen sliding scale   SubCutaneous at bedtime  latanoprost 0.005% Ophthalmic Solution 1 Drop(s) Both EYES at bedtime  multivitamin 1 Tablet(s) Oral daily  mupirocin 2% Ointment 1 Application(s) Both Nostrils two times a day  simvastatin 20 milliGRAM(s) Oral at bedtime  sodium bicarbonate  Infusion 0.056 mEq/kG/Hr IV Continuous <Continuous>  tamsulosin 0.4 milliGRAM(s) Oral at bedtime  thiamine 100 milliGRAM(s) Oral daily    PRN Inpatient Medications  acetaminophen     Tablet .. 650 milliGRAM(s) Oral every 4 hours PRN  ALBUTerol    90 MICROgram(s) HFA Inhaler 2 Puff(s) Inhalation every 6 hours PRN  guaifenesin/dextromethorphan Oral Liquid 10 milliLiter(s) Oral every 4 hours PRN  heparin   Injectable 8000 Unit(s) IV Push every 6 hours PRN  heparin   Injectable 4000 Unit(s) IV Push every 6 hours PRN      REVIEW OF SYSTEMS  --------------------------------------------------------------------------------  unable to obtain   VITALS/PHYSICAL EXAM  --------------------------------------------------------------------------------  T(C): 36.4 (01-01-22 @ 05:14), Max: 36.4 (12-31-21 @ 10:57)  HR: 73 (01-01-22 @ 06:51) (71 - 79)  BP: 91/53 (01-01-22 @ 05:14) (91/53 - 97/58)  RR: 18 (01-01-22 @ 06:51) (18 - 21)  SpO2: 94% (01-01-22 @ 06:51) (93% - 99%)  Wt(kg): --        12-31-21 @ 07:01  -  01-01-22 @ 07:00  --------------------------------------------------------  IN: 0 mL / OUT: 50 mL / NET: -50 mL      Physical Exam:  	Gen: on high flow  	HEENT: MMM  	Pulm: Coarse breath sounds  B/L  	CV: S1S2  	Abd: Soft, +BS  	Ext: No LE edema B/L                      Neuro: lethargic  	Skin: Warm and Dry   	Vascular access: NO HD catheter           TOMER landeros  LABS/STUDIES  --------------------------------------------------------------------------------              13.1   34.32 >-----------<  251      [12-31-21 @ 23:10]              37.0     147  |  110  |  119  ----------------------------<  220      [12-31-21 @ 13:01]  4.7   |  15  |  4.38        Ca     7.8     [12-31-21 @ 13:01]    TPro  5.4  /  Alb  2.0  /  TBili  0.9  /  DBili  x   /  AST  140  /  ALT  117  /  AlkPhos  186  [12-31-21 @ 13:01]      PTT: >200.0      [12-31-21 @ 23:10]      Creatinine Trend:  SCr 4.38 [12-31 @ 13:01]  SCr 3.05 [12-30 @ 07:18]  SCr 2.39 [12-29 @ 12:23]  SCr 2.13 [12-29 @ 09:50]  SCr 2.05 [12-28 @ 06:12]    Urinalysis - [12-26-21 @ 06:04]      Color Yellow / Appearance Slightly Turbid / SG 1.025 / pH 6.0      Gluc Negative / Ketone Negative  / Bili Negative / Urobili Negative       Blood Moderate / Protein 30 mg/dL / Leuk Est Negative / Nitrite Negative      RBC 13 / WBC 12 / Hyaline 4 / Gran  / Sq Epi  / Non Sq Epi 8 / Bacteria Few      Ferritin 990      [12-28-21 @ 09:02]    HBsAg Nonreact      [12-25-21 @ 09:17]  HCV 0.86, Nonreact      [12-25-21 @ 09:17]

## 2022-01-01 NOTE — PROGRESS NOTE ADULT - NUTRITIONAL ASSESSMENT
This patient has been assessed with a concern for Malnutrition and has been determined to have a diagnosis/diagnoses of Mild protein-calorie malnutrition.    This patient is being managed with:   Diet Regular-  Pureed (PUREED)  Moderately Thick Liquids (MODTHICKLIQS)  Supplement Feeding Modality:  Oral  Glucerna Shake Cans or Servings Per Day:  2       Frequency:  Daily  Entered: Dec 25 2021  8:56PM    
This patient has been assessed with a concern for Malnutrition and has been determined to have a diagnosis/diagnoses of Mild protein-calorie malnutrition.    This patient is being managed with:   Diet Regular-  Pureed (PUREED)  Moderately Thick Liquids (MODTHICKLIQS)  Entered: Dec 24 2021 12:28AM    
This patient has been assessed with a concern for Malnutrition and has been determined to have a diagnosis/diagnoses of Mild protein-calorie malnutrition.    This patient is being managed with:   Diet Regular-  Pureed (PUREED)  Moderately Thick Liquids (MODTHICKLIQS)  Supplement Feeding Modality:  Oral  Glucerna Shake Cans or Servings Per Day:  2       Frequency:  Daily  Entered: Dec 25 2021  8:56PM    

## 2022-01-01 NOTE — PROGRESS NOTE ADULT - ASSESSMENT
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   Per daughter ,patients home attendant noted that patient was breathing heavy on day of admission. Per family , patient has been having chest congestion for the past month , intermittent coughing with white sputum, occasional dysphagia  patient is bedbound, and has been sleeping most of the day for the past few months, with minimal interaction, although at times does perk up and may communicate    Leukocytosis  likely 2/2 COVID infection and ongoing MRSA bacteremia  only 1 stool recorded over the past 24 hours, therefore C Diff unlikely  poor prognosis  GOC discussion  rec palliative care consult    MRSA bacteremia  blood cx 12/27 positive for MRSA in 4/4 bottles  unclear source at this time, possibly skin wound vs PNA  TTE w/o mention of vegetations  c/w vancomycin, goal trough 15-20  as level elevated will hold today's dose and repeat trough daily starting tomorrow w/ AM labs  restart vanc at 750mg daily when level <15  repeat blood cultures 12/30 positive  repeat blood cx ordered for today  poor prognosis    Covid PNA, acute hypoxic resp failure  - Pt with low grade temp on admission 100F, WBC 11.  Initially requiring 4L NC.  CTA chest no PE, (+) peripheral GGOs and b/l endobronchial secretions.  s/p landeros placement for urinary retention  - Pt on home AC, apixiban.  No PE on CTA chest.  c/w remdesivir x 5 day course, last day today  c/w dexamethasone 6mg IV qdaily x 10 days  s/p tocilizumab 12/27  CXR w/ possible LLL infiltrate  c/w cefepime to complete 5 day course for possible superimposed aspiration PNA  Wean O2 as tolerated  monitor inflammatory markers    Henrry Mejia M.D.  [facility], Division of Infectious Diseases  [phone]  After 5pm on weekdays and all day on weekends - please call [phone]

## 2022-01-01 NOTE — RAPID RESPONSE TEAM SUMMARY - NSSITUATIONBACKGROUNDRRT_GEN_ALL_CORE
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 Found to be COVID positive.     RRT called today for hypotension to the 50s. Upon arrival, SBP 58. HR 40s. Previously on AC per primary team, held during the day 2/2 c/f bleed. During RRT noted to have bloody oral secretions. 500cc IVF bolus started, given atropine 0.5mg IV x 1 without significant improvement. GOC discussion had w/ patient's family during RRT. Made comfort care per HCP. Family requesting to facetime with patient. To be arranged by primary team.

## 2022-01-01 NOTE — PROGRESS NOTE ADULT - PROBLEM SELECTOR PLAN 2
- GGO's on CT, no solid consolidation, procal < 1 on admission.  - Continue Dexamethaone 6mg x 10 day course  - S/p remdesivir 12/27-12/30   - monitor liver tests. (he is on amiodarone for VTs. card f/u). neg acute hepatitis panel. RUQ sono negative.  - initially being observed off antibiotics   - on 12/27/21, his respiratory status deteriorating as well as his mental status. more sleepy  - NC to ventimask --> nonrebreather, hiflow. monitoring O2. rapid deterioration so Toci x 1 given 12/27/21    Given aspiration risk, also started IV Zosyn empirically.  - CXR stable left sided opacity.. monitor inflammatory markers, repeat procalcitonin only 0.66  - already on full AC (Eliquis) so doubt superimposed PE.  - empiric Zosyn IV started to cover superimposed aspiration.  Switched to cefepime 12/29 for less chance of nephrotoxicity  blood cultures 12/27/21 grew MRSA.  On vancomycin daily.   - appreciate ID
GGo's on CT, no solid consolidation, low suspicion for bacterial pna, procal < 1   - Continue Dexamethaone 6mg x 10 day course  -  Will hold off on starting Remdesivir given renal dysfunction and elevated liver tests  - will discuss with ID.   - monitor liver tests. (he is on amiodarone for VTs. card f/u). will check acute hepatitis panel and RUQ sono.   - Airborne + contact Isolation   - Observe off antibiotics   - Guaifenesin/ dextromethorphan PRN
GGo's on CT, no solid consolidation, low suspicion for bacterial pna, procal < 1   - Continue Dexamethaone 6mg x 10 day course  - Will hold off on starting Remdesivir given renal dysfunction and elevated liver tests  - will discuss with ID.   - monitor liver tests. (he is on amiodarone for VTs. card f/u). neg acute hepatitis panel. RUQ sono pending   - Airborne + contact Isolation   - Observe off antibiotics   - Guaifenesin/ dextromethorphan PRN
- GGO's on CT, no solid consolidation, procal < 1 on admission.  - Continue Dexamethaone 6mg x 10 day course  - Will hold off on starting Remdesivir given renal dysfunction and elevated liver tests  - monitor liver tests. (he is on amiodarone for VTs. card f/u). neg acute hepatitis panel. RUQ sono negative.  - initially being observed off antibiotics   - on 12/27/21, his respiratory status deteriorating as well as his mental status. more sleepy  - NC to ventimask --> nonrebreather, hiflow. monitoring O2. rapid deterioation so Toci x 1 given 12/27/21    Given aspiration risk, also started IV Zosyn empirically.  - CXR stable left sided opacity.. monitor inflammatory markers, repeat procalcitonin only 0.66  - already on full AC (Eliquis) so doubt superimposed PE.  - empiric Zosyn IV started to cover superimposed aspiration. blood cultures shows MRSA. vanco x 1 given.   ID follow up.
- GGO's on CT, no solid consolidation, procal < 1 on admission.  - Continue Dexamethaone 6mg x 10 day course  - S/p remdesivir 12/27-12/30   - monitor liver tests. (he is on amiodarone for VTs. card f/u). neg acute hepatitis panel. RUQ sono negative.  - initially being observed off antibiotics   - on 12/27/21, his respiratory status deteriorating as well as his mental status. more sleepy  - NC to ventimask --> nonrebreather, hiflow. monitoring O2. rapid deterioration so Toci x 1 given 12/27/21    Given aspiration risk, also started IV Zosyn empirically.  - CXR stable left sided opacity.. monitor inflammatory markers, repeat procalcitonin only 0.66  - already on full AC (Eliquis) so doubt superimposed PE.  - empiric Zosyn IV started to cover superimposed aspiration.  Switched to cefepime 12/29 for less chance of nephrotoxicity  blood cultures 12/27/21 grew MRSA.  On vancomycin daily.   - appreciate ID
- GGO's on CT, no solid consolidation, procal < 1 on admission.  - Continue Dexamethaone 6mg x 10 day course  - Will hold off on starting Remdesivir given renal dysfunction and elevated liver tests  - monitor liver tests. (he is on amiodarone for VTs. card f/u). neg acute hepatitis panel. RUQ sono negative.  - initially being observed off antibiotics   - on 12/27/21, his respiratory status deteriorating as well as his mental status. more sleepy  - NC to ventimask --> nonrebreather, hiflow. monitoring O2. rapid deterioration so Toci x 1 given 12/27/21    Given aspiration risk, also started IV Zosyn empirically.  - CXR stable left sided opacity.. monitor inflammatory markers, repeat procalcitonin only 0.66  - already on full AC (Eliquis) so doubt superimposed PE.  - empiric Zosyn IV started to cover superimposed aspiration.  Switched to cefepime 12/29 for less chance of nephrotoxicity  blood cultures 12/27/21 grew MRSA.  On vancomycin daily.   - appreciate ID
GGO's on CT, no solid consolidation, procal < 1 on admission.  - Continue Dexamethaone 6mg x 10 day course  - Will hold off on starting Remdesivir given renal dysfunction and elevated liver tests  - monitor liver tests. (he is on amiodarone for VTs. card f/u). neg acute hepatitis panel. RUQ sono negative.  - initially being observed off antibiotics   - on 12/27/21, his respiratory status deteriorating as well as his mental status. more sleepy  - NC to ventimask. monitoring O2.   - empiric Zosyn IV started to cover superimposed aspiration. check blood cultures  - repeat CXR and inflamamtory markers, repeat procalcitonin. check ABG if more lethargic.   - mild Cr elevation on Low dose Lasix 20 mg qdaily. TTE without significant LV dysfunction so doubt fluid overload.   already on full AC (Eliquis) so doubt superimposed PE.
- GGO's on CT, no solid consolidation, procal < 1 on admission.  - Continue Dexamethaone 6mg x 10 day course  - Will hold off on starting Remdesivir given renal dysfunction and elevated liver tests  - monitor liver tests. (he is on amiodarone for VTs. card f/u). neg acute hepatitis panel. RUQ sono negative.  - initially being observed off antibiotics   - on 12/27/21, his respiratory status deteriorating as well as his mental status. more sleepy  - NC to ventimask --> nonrebreather, hiflow. monitoring O2. rapid deterioration so Toci x 1 given 12/27/21    Given aspiration risk, also started IV Zosyn empirically.  - CXR stable left sided opacity.. monitor inflammatory markers, repeat procalcitonin only 0.66  - already on full AC (Eliquis) so doubt superimposed PE.  - empiric Zosyn IV started to cover superimposed aspiration. blood cultures 12/27/21 grew MRSA. vanco x 1 given.   ID follow up.
GGo's on CT, no solid consolidation, low suspicion for bacterial pna, procal < 1   - Continue Dexamethaone 6mg x 10 day course  - Will hold off on starting Remdesivir given renal dysfunction and elevated liver tests  - will discuss with ID.   - monitor liver tests. (he is on amiodarone for VTs. card f/u). neg acute hepatitis panel. RUQ sono pending   - Airborne + contact Isolation   - Observe off antibiotics   - Guaifenesin/ dextromethorphan PRN

## 2022-01-01 NOTE — PROGRESS NOTE ADULT - PROBLEM SELECTOR PLAN 8
HCP is daughter Aurelia [phone] and wife, living will in chart, specified to not undergo life sustaining measures such as CPR and mechanical intubation   - DNR/DNI
HCP is daughter Aurelia [phone] and wife, living will in chart, specified to not undergo life sustaining measures such as CPR and mechanical intubation   - DNR/DNI
HCP is daughter Aurelia [phone] and wife, living will in chart, specified to not undergo life sustaining measures such as CPR and mechanical intubation   - DNR/DNI
HCP is daughter Aurelia [phone] and wife, living will in chart, specified to not undergo life sustaining measures such as CPR and mechanical intubation   - DNR/DNI
HCP is daughter Aurelia [phone] and wife, living will in chart, specified to not undergo life sustaining measures such as CPR and mechanical intubation   - DNR/DNI
HCP is daughter Aurelia [phone] and wife, living will in chart, specified to not undergo life sustaining measures such as CPR and mechanical intubation   - DNR/DNI
HCP is daughter Aurelia [phone] and wife, living will in chart, specified to not undergo life sustaining measures such as CPR and mechanical intubation   - DNR/DNI
HCP is daughter Aurelia [phone] and wife, living will in chart, specified to not undergo life sustaining measures such as CPR and mechanical intubation   - DNR/DNI  - palliative care consultation is appropriate @ this point
HCP is daughter Aurelia [phone] and wife, living will in chart, specified to not undergo life sustaining measures such as CPR and mechanical intubation   - DNR/DNI

## 2022-01-01 NOTE — PROVIDER CONTACT NOTE (OTHER) - DATE AND TIME:
31-Dec-2021 23:45
31-Dec-2022 00:31
27-Dec-2021 05:40
31-Dec-2021 16:00
29-Dec-2021 02:00
27-Dec-2021 18:00
30-Dec-2021 18:40

## 2022-01-01 NOTE — PROGRESS NOTE ADULT - PROBLEM SELECTOR PLAN 3
type II MI in setting of hypoxic respiratory failure

## 2022-01-01 NOTE — CHART NOTE - NSCHARTNOTESELECT_GEN_ALL_CORE
Expiration/Event Note
Cardiology/Event Note
Event Note

## 2022-01-01 NOTE — PROGRESS NOTE ADULT - SUBJECTIVE AND OBJECTIVE BOX
[facility], Division of Infectious Diseases  YOCASTA Pedraza Y. Patel, S. Shah, G. Casimir  [phone]  ([phone] - weekdays after 5pm and weekends)    Name: BRIA COREY  Age/Gender: 89y Male  MRN: [id number]    Interval History:  Patient seen this morning.   Notes reviewed.   No concerning overnight events.  Afebrile.     Allergies: Cipro (Hives)  fish (Unknown)      Objective:  Vitals:   T(F): 97.6 (01-01-22 @ 05:14), Max: 97.6 (01-01-22 @ 05:14)  HR: 73 (01-01-22 @ 06:51) (71 - 77)  BP: 91/53 (01-01-22 @ 05:14) (91/53 - 93/61)  RR: 18 (01-01-22 @ 06:51) (18 - 20)  SpO2: 94% (01-01-22 @ 06:51) (93% - 95%)  Physical Examination:  General: ill appearing  HEENT: anicteric  Cardio: S1, S2 present, normal rate  Resp: on HFNC  Abd: soft, ND  Ext: no LE edema, + b/l upper ext edema  Skin: multiple areas of ecchymoses and healing scabs  Lines:    Laboratory Studies:  CBC:                       13.1   34.32 )-----------( 251      ( 31 Dec 2021 23:10 )             37.0     WBC Trend:  34.32 12-31-21 @ 23:10  35.26 12-31-21 @ 13:01  29.17 12-30-21 @ 07:18  19.14 12-29-21 @ 09:50  13.75 12-28-21 @ 06:14  15.35 12-27-21 @ 21:44  17.33 12-27-21 @ 14:40  13.61 12-26-21 @ 06:04    CMP: 12-31    147<H>  |  110<H>  |  119<H>  ----------------------------<  220<H>  4.7   |  15<L>  |  4.38<H>    Ca    7.8<L>      31 Dec 2021 13:01    TPro  5.4<L>  /  Alb  2.0<L>  /  TBili  0.9  /  DBili  x   /  AST  140<H>  /  ALT  117<H>  /  AlkPhos  186<H>  12-31      LIVER FUNCTIONS - ( 31 Dec 2021 13:01 )  Alb: 2.0 g/dL / Pro: 5.4 g/dL / ALK PHOS: 186 U/L / ALT: 117 U/L / AST: 140 U/L / GGT: x           Vancomycin Level, Trough: 27.6 ug/mL (12-31-21 @ 23:09)  Vancomycin Level, Trough: 24.9 ug/mL (12-31-21 @ 10:17)  Vancomycin Level, Trough: 18.2 ug/mL (12-30-21 @ 19:12)      Microbiology: reviewed     Culture - Blood (collected 12-30-21 @ 10:41)  Source: .Blood Blood-Peripheral  Gram Stain (12-31-21 @ 20:05):    Growth in anaerobic bottle: Gram Positive Cocci in Clusters  Preliminary Report (12-31-21 @ 20:05):    Growth in anaerobic bottle: Gram Positive Cocci in Clusters    Culture - Blood (collected 12-30-21 @ 10:41)  Source: .Blood Blood-Peripheral  Gram Stain (01-01-22 @ 03:02):    Growth in aerobic bottle: Gram Positive Cocci in Clusters  Preliminary Report (01-01-22 @ 03:03):    Growth in aerobic bottle: Gram Positive Cocci in Clusters    Culture - Blood (collected 12-27-21 @ 21:59)  Source: .Blood Blood-Peripheral  Gram Stain (12-29-21 @ 03:57):    Growth in anaerobic bottle: Gram Positive Cocci in Clusters    Growth in aerobic bottle: Gram Positive Cocci in Clusters  Final Report (12-30-21 @ 08:12):    Growth in aerobic and anaerobic bottles: Methicillin Resistant    Staphylococcus aureus    ***Blood Panel PCR results on this specimen are available    approximately 3 hours after the Gram stain result.***    Gram stain, PCR, and/or culture results may notalways    correspond due to difference in methodologies.    ************************************************************    This PCR assay was performed by multiplex PCR. This    Assay tests for 66 bacterial and resistance gene targets.    Please refer to the [facility] test directory    at [email or web address] for details.  Organism: Blood Culture PCR  Methicillin resistant Staphylococcus aureus (12-30-21 @ 08:12)  Organism: Methicillin resistant Staphylococcus aureus (12-30-21 @ 08:12)      -  Ampicillin/Sulbactam: R 16/8      -  Cefazolin: R >16      -  Clindamycin: S <=0.25      -  Daptomycin: S 1      -  Erythromycin: R >4      -  Gentamicin: S <=1 Should not be used as monotherapy      -  Linezolid: S 4      -  Oxacillin: R >2      -  Penicillin: R >8      -  Rifampin: S <=1 Should not be used as monotherapy      -  Tetra/Doxy: S 2      -  Trimethoprim/Sulfamethoxazole: R >2/38      -  Vancomycin: S 2      Method Type: GERALD  Organism: Blood Culture PCR (12-30-21 @ 08:12)      -  Methicillin resistant Staphylococcus aureus (MRSA): Detec      Method Type: PCR    Culture - Blood (collected 12-27-21 @ 21:59)  Source: .Blood Blood-Peripheral  Gram Stain (12-28-21 @ 22:20):    Growth in aerobic bottle: Gram Positive Cocci in Clusters    Growth in anaerobic bottle: Gram Positive Cocci in Clusters  Final Report (12-30-21 @ 08:35):    Growth in aerobic and anaerobic bottles: Methicillin Resistant    Staphylococcus aureus    See previous culture [id number]    Growth in aerobic bottle: Staphylococcus epidermidis Coag Negative    Staphylococcus    Single set isolate, possible contaminant. Contact    Microbiology if susceptibility testing clinically    indicated.    Culture - Urine (collected 12-26-21 @ 09:30)  Source: Clean Catch Clean Catch (Midstream)  Final Report (12-27-21 @ 07:40):    <10,000 CFU/mL Normal Urogenital Amber    Culture - Blood (collected 12-23-21 @ 18:53)  Source: .Blood Blood-Peripheral  Final Report (12-28-21 @ 19:01):    No Growth Final    Culture - Blood (collected 12-23-21 @ 18:53)  Source: .Blood Blood-Peripheral  Final Report (12-28-21 @ 19:00):    No Growth Final      Radiology: reviewed     Medications:  acetaminophen     Tablet .. 650 milliGRAM(s) Oral every 4 hours PRN  ALBUTerol    90 MICROgram(s) HFA Inhaler 2 Puff(s) Inhalation every 6 hours PRN  aMIOdarone    Tablet 200 milliGRAM(s) Oral daily  ascorbic acid 500 milliGRAM(s) Oral daily  aspirin enteric coated 81 milliGRAM(s) Oral daily  cefepime   IVPB 1000 milliGRAM(s) IV Intermittent every 24 hours  chlorhexidine 4% Liquid 1 Application(s) Topical <User Schedule>  cholecalciferol 2000 Unit(s) Oral daily  dextrose 40% Gel 15 Gram(s) Oral once  dextrose 5%. 1000 milliLiter(s) IV Continuous <Continuous>  dextrose 5%. 1000 milliLiter(s) IV Continuous <Continuous>  dextrose 50% Injectable 25 Gram(s) IV Push once  dextrose 50% Injectable 12.5 Gram(s) IV Push once  escitalopram 15 milliGRAM(s) Oral daily  finasteride 5 milliGRAM(s) Oral daily  glucagon  Injectable 1 milliGRAM(s) IntraMuscular once  guaifenesin/dextromethorphan Oral Liquid 10 milliLiter(s) Oral every 4 hours PRN  heparin   Injectable 8000 Unit(s) IV Push every 6 hours PRN  heparin   Injectable 4000 Unit(s) IV Push every 6 hours PRN  heparin  Infusion.  Unit(s)/Hr IV Continuous <Continuous>  insulin glargine Injectable (LANTUS) 8 Unit(s) SubCutaneous at bedtime  insulin lispro (ADMELOG) corrective regimen sliding scale   SubCutaneous three times a day before meals  insulin lispro (ADMELOG) corrective regimen sliding scale   SubCutaneous at bedtime  latanoprost 0.005% Ophthalmic Solution 1 Drop(s) Both EYES at bedtime  multivitamin 1 Tablet(s) Oral daily  mupirocin 2% Ointment 1 Application(s) Both Nostrils two times a day  simvastatin 20 milliGRAM(s) Oral at bedtime  sodium bicarbonate  Infusion 0.056 mEq/kG/Hr IV Continuous <Continuous>  tamsulosin 0.4 milliGRAM(s) Oral at bedtime  thiamine 100 milliGRAM(s) Oral daily    Antimicrobials:  cefepime   IVPB 1000 milliGRAM(s) IV Intermittent every 24 hours

## 2022-01-01 NOTE — PROGRESS NOTE ADULT - PROBLEM SELECTOR PLAN 7
- Hold oral hypoglycemics  - insulin correction scale  - check fsg qac/qhs  - check a1c    - consistent carb diet
- Hold oral hypoglycemics  - insulin correction scale  - check fsg qac/qhs  - A1c = 6.3   - consistent carb diet
- Hold oral hypoglycemics  - insulin correction scale  - check fsg qac/qhs  - check a1c    - consistent carb diet
- Hold oral hypoglycemics  - insulin correction scale  - check fsg qac/qhs  - check a1c    - consistent carb diet
- Hold oral hypoglycemics  - insulin correction scale  - check fsg qac/qhs  - A1c = 6.3   - consistent carb diet
- Hold oral hypoglycemics  - insulin correction scale  - check fsg qac/qhs  - A1c = 6.3   - consistent carb diet
- Hold oral hypoglycemics  - insulin correction scale  - check fsg qac/qhs  - check a1c    - consistent carb diet
- Hold oral hypoglycemics  - insulin correction scale  - check fsg qac/qhs  - A1c = 6.3   - consistent carb diet
- Hold oral hypoglycemics  - insulin correction scale  - check fsg qac/qhs  - check a1c    - consistent carb diet

## 2022-04-25 ENCOUNTER — APPOINTMENT (OUTPATIENT)
Dept: CARDIOLOGY | Facility: CLINIC | Age: 87
End: 2022-04-25

## 2022-07-11 NOTE — PHYSICAL THERAPY INITIAL EVALUATION ADULT - ADDITIONAL COMMENTS
No
Pt lives in a private house with spouse and has 24/7 HHA to assist with all functional mobility. Pt amb with RW and has w/c for long distances.

## 2023-07-26 NOTE — ED ADULT NURSE NOTE - OBJECTIVE STATEMENT
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 Patient states he landed on his lower back after falling. Patient has decrease sensation and strength on the left upper and lower extremity. Patient denies headaches, vision changes, head trauma, loss of consciousness. 26-Jul-2023 16:22

## 2023-12-08 NOTE — ED PROVIDER NOTE - IV ALTEPLASE DOOR HIDDEN
I called and spoke to sonTulio a couple of times.  We are having trouble finding a radiation oncology appt to fit his schedule.  NPS (new patient scheduling) team is working on that now.  
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 Son  9/22/2023:  ENT tumor conference discussion    IMAGING:  ~multiple scans~  12/9/2023:  PET CT scheduled  11/17/2023:  CT facial bones    PATHOLOGY:  12/1/2023--PENDING  Intraoperative Consultation   P   A(1). 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  -No definite evidence of invasive malignancy identified.  Electronically signed by Satish Sevilla MD on 11/15/2023         10/04/2023  Final Diagnosis  A. 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    
show

## 2025-01-10 NOTE — PROGRESS NOTE ADULT - PROBLEM SELECTOR PLAN 1
2/2 covid pna   - on Hi-Andrea NCO2
2/2 covid pna   - continue supplemental O2, wean as tolerates  see below
2/2 covid pna   - on Hi-Andrea NCO2
2/2 covid pna   - continue supplemental O2, wean as tolerates  see below
none

## 2025-04-28 NOTE — ED ADULT NURSE NOTE - NSSISCREENINGQ3_ED_A_ED
Name: Carlos A Gloria      : 1964      MRN: [id number]  Encounter Provider: Wes Yoo MD  Encounter Date: 2025   Encounter department: [facility]  :  Assessment & Plan  Left inguinal hernia           1.  CMP and CBC 2025 largely unremarkable.  2.  EKG 15 April 2025.  3.  Plan left inguinal hernia repair with mesh.    History of Present Illness  HPI  Carlos A Gloria is a 61 y.o. male who presents with referral from his PCP, Dr. Lugo, for left inguinal hernia.  Patient reports some discomfort in this area and a left inguinal hernia was noted on physical exam.    Patient has no chronic pulmonary or bowel or bladder obstructive complaints.        Review of Systems   Constitutional:  Negative for chills and fever.   Respiratory: Negative.     Cardiovascular: Negative.    Gastrointestinal: Negative.    Genitourinary: Negative.    Musculoskeletal: Negative.    Neurological: Negative.    Hematological: Negative.    All other systems reviewed and are negative.    Medical History Reviewed by provider this encounter:     .  Past Medical History  Past Medical History:   Diagnosis Date    ED (erectile dysfunction)     Hyperlipidemia      Past Surgical History:   Procedure Laterality Date    ADENOIDECTOMY      APPENDECTOMY      COLONOSCOPY      GALLBLADDER SURGERY       Family History   Problem Relation Age of Onset    Coronary artery disease Father 71        CAD involving CABG of native heart with other forms of angina pectoris    Hypertension Father         Essential Hypertension    Heart attack Father         Myocardial Infarction involving other coronary artery    Heart attack Cousin         Myocardial Infarction involving other coronary artery    Mental illness Neg Hx     Substance Abuse Neg Hx       reports that he has never smoked. He has never been exposed to tobacco smoke. He has never used smokeless tobacco. He reports current alcohol use. He reports that he does  not use drugs.  Current Outpatient Medications   Medication Instructions    aspirin (ECOTRIN LOW STRENGTH) 81 mg, Oral, Daily    atorvastatin (LIPITOR) 20 mg, Oral, Daily    sildenafil (VIAGRA) 100 mg, Oral, As needed   No Known Allergies   Current Outpatient Medications on File Prior to Visit   Medication Sig Dispense Refill    aspirin (ECOTRIN LOW STRENGTH) 81 mg EC tablet Take 1 tablet (81 mg total) by mouth daily 90 tablet 1    atorvastatin (LIPITOR) 20 mg tablet Take 1 tablet (20 mg total) by mouth daily 90 tablet 1    sildenafil (VIAGRA) 100 mg tablet Take 1 tablet (100 mg total) by mouth as needed for erectile dysfunction 10 tablet 5     No current facility-administered medications on file prior to visit.      Social History     Tobacco Use    Smoking status: Never     Passive exposure: Never    Smokeless tobacco: Never   Vaping Use    Vaping status: Never Used   Substance and Sexual Activity    Alcohol use: Yes     Comment: Minimum alcohol consumption    Drug use: No    Sexual activity: Not on file        Objective  There were no vitals taken for this visit.     Physical Exam  Vitals reviewed.   Constitutional:       General: He is not in acute distress.     Appearance: Normal appearance.   Cardiovascular:      Rate and Rhythm: Normal rate and regular rhythm.   Pulmonary:      Effort: Pulmonary effort is normal. No respiratory distress.      Breath sounds: Normal breath sounds.   Abdominal:      General: Abdomen is flat. There is no distension.      Palpations: Abdomen is soft.      Comments: Small reducible left inguinal hernia.  No right inguinal hernia.   Musculoskeletal:         General: Normal range of motion.      Cervical back: Normal range of motion and neck supple.   Lymphadenopathy:      Cervical: No cervical adenopathy.   Skin:     General: Skin is warm and dry.   Neurological:      General: No focal deficit present.      Mental Status: He is alert.              No

## 2025-07-11 NOTE — ED ADULT NURSE NOTE - CCCP TRG CHIEF CMPLNT
FYI: pt requested that the refill for Empagliflozin (Jardiance) 25 MG TABS to be sent to   [facility]  [address] [place] [id number] not the mail-order. Office was closed. Therefore, Nurse Billings kindly transferred to the local pharmacy.     
urinary symptoms